# Patient Record
Sex: FEMALE | URBAN - METROPOLITAN AREA
[De-identification: names, ages, dates, MRNs, and addresses within clinical notes are randomized per-mention and may not be internally consistent; named-entity substitution may affect disease eponyms.]

---

## 2022-10-05 ENCOUNTER — APPOINTMENT (OUTPATIENT)
Dept: URBAN - METROPOLITAN AREA SURGERY 18 | Age: 77
Setting detail: DERMATOLOGY
End: 2022-10-10

## 2022-10-05 DIAGNOSIS — D18.0 HEMANGIOMA: ICD-10-CM

## 2022-10-05 DIAGNOSIS — L57.0 ACTINIC KERATOSIS: ICD-10-CM

## 2022-10-05 DIAGNOSIS — Z85.828 PERSONAL HISTORY OF OTHER MALIGNANT NEOPLASM OF SKIN: ICD-10-CM

## 2022-10-05 DIAGNOSIS — D485 NEOPLASM OF UNCERTAIN BEHAVIOR OF SKIN: ICD-10-CM

## 2022-10-05 DIAGNOSIS — L82.1 OTHER SEBORRHEIC KERATOSIS: ICD-10-CM

## 2022-10-05 DIAGNOSIS — Z85.820 PERSONAL HISTORY OF MALIGNANT MELANOMA OF SKIN: ICD-10-CM

## 2022-10-05 DIAGNOSIS — Z71.89 OTHER SPECIFIED COUNSELING: ICD-10-CM

## 2022-10-05 DIAGNOSIS — L81.4 OTHER MELANIN HYPERPIGMENTATION: ICD-10-CM

## 2022-10-05 DIAGNOSIS — D22 MELANOCYTIC NEVI: ICD-10-CM

## 2022-10-05 PROBLEM — D18.01 HEMANGIOMA OF SKIN AND SUBCUTANEOUS TISSUE: Status: ACTIVE | Noted: 2022-10-05

## 2022-10-05 PROBLEM — D22.5 MELANOCYTIC NEVI OF TRUNK: Status: ACTIVE | Noted: 2022-10-05

## 2022-10-05 PROBLEM — D22.62 MELANOCYTIC NEVI OF LEFT UPPER LIMB, INCLUDING SHOULDER: Status: ACTIVE | Noted: 2022-10-05

## 2022-10-05 PROBLEM — D22.61 MELANOCYTIC NEVI OF RIGHT UPPER LIMB, INCLUDING SHOULDER: Status: ACTIVE | Noted: 2022-10-05

## 2022-10-05 PROBLEM — D48.5 NEOPLASM OF UNCERTAIN BEHAVIOR OF SKIN: Status: ACTIVE | Noted: 2022-10-05

## 2022-10-05 PROBLEM — D22.72 MELANOCYTIC NEVI OF LEFT LOWER LIMB, INCLUDING HIP: Status: ACTIVE | Noted: 2022-10-05

## 2022-10-05 PROBLEM — D22.71 MELANOCYTIC NEVI OF RIGHT LOWER LIMB, INCLUDING HIP: Status: ACTIVE | Noted: 2022-10-05

## 2022-10-05 PROCEDURE — 17003 DESTRUCT PREMALG LES 2-14: CPT

## 2022-10-05 PROCEDURE — OTHER COUNSELING: OTHER

## 2022-10-05 PROCEDURE — OTHER BIOPSY BY SHAVE METHOD: OTHER

## 2022-10-05 PROCEDURE — OTHER DIAGNOSIS COMMENT: OTHER

## 2022-10-05 PROCEDURE — 99204 OFFICE O/P NEW MOD 45 MIN: CPT | Mod: 25

## 2022-10-05 PROCEDURE — 17000 DESTRUCT PREMALG LESION: CPT | Mod: 59

## 2022-10-05 PROCEDURE — OTHER MEDICATION COUNSELING: OTHER

## 2022-10-05 PROCEDURE — 11103 TANGNTL BX SKIN EA SEP/ADDL: CPT

## 2022-10-05 PROCEDURE — OTHER TREATMENT REGIMEN: OTHER

## 2022-10-05 PROCEDURE — OTHER PRESCRIPTION: OTHER

## 2022-10-05 PROCEDURE — OTHER LIQUID NITROGEN: OTHER

## 2022-10-05 PROCEDURE — 11102 TANGNTL BX SKIN SINGLE LES: CPT

## 2022-10-05 RX ORDER — FLUOROURACIL 2 G/40G
CREAM TOPICAL BID
Qty: 40 | Refills: 0 | Status: ERX | COMMUNITY
Start: 2022-10-05

## 2022-10-05 ASSESSMENT — LOCATION DETAILED DESCRIPTION DERM
LOCATION DETAILED: RIGHT MID-UPPER BACK
LOCATION DETAILED: LEFT ANTERIOR DISTAL THIGH
LOCATION DETAILED: RIGHT ANTERIOR PROXIMAL THIGH
LOCATION DETAILED: RIGHT INFERIOR MEDIAL UPPER BACK
LOCATION DETAILED: RIGHT MEDIAL FOREHEAD
LOCATION DETAILED: LEFT DISTAL PRETIBIAL REGION
LOCATION DETAILED: RIGHT PROXIMAL RADIAL DORSAL FOREARM
LOCATION DETAILED: RIGHT DISTAL PRETIBIAL REGION
LOCATION DETAILED: MID TRAPEZIAL NECK
LOCATION DETAILED: RIGHT PROXIMAL DORSAL FOREARM
LOCATION DETAILED: LEFT ANTERIOR SHOULDER
LOCATION DETAILED: LEFT SUPERIOR CRUS OF ANTIHELIX
LOCATION DETAILED: LEFT LATERAL TEMPLE
LOCATION DETAILED: STERNAL NOTCH
LOCATION DETAILED: RIGHT ANTERIOR DISTAL THIGH
LOCATION DETAILED: LEFT ANTERIOR PROXIMAL UPPER ARM
LOCATION DETAILED: PERIUMBILICAL SKIN
LOCATION DETAILED: LEFT SUPERIOR MEDIAL UPPER BACK
LOCATION DETAILED: RIGHT ANTERIOR PROXIMAL UPPER ARM
LOCATION DETAILED: RIGHT MEDIAL SUPERIOR CHEST
LOCATION DETAILED: MIDDLE STERNUM
LOCATION DETAILED: SUBXIPHOID
LOCATION DETAILED: RIGHT LATERAL TEMPLE
LOCATION DETAILED: EPIGASTRIC SKIN
LOCATION DETAILED: LEFT ANTERIOR PROXIMAL THIGH
LOCATION DETAILED: SUPERIOR THORACIC SPINE
LOCATION DETAILED: RIGHT DISTAL DORSAL FOREARM
LOCATION DETAILED: RIGHT SUPERIOR UPPER BACK
LOCATION DETAILED: RIGHT ANTERIOR SHOULDER
LOCATION DETAILED: LEFT INFERIOR MEDIAL MALAR CHEEK
LOCATION DETAILED: RIGHT INFERIOR LATERAL FOREHEAD
LOCATION DETAILED: LEFT INFERIOR FOREHEAD

## 2022-10-05 ASSESSMENT — LOCATION SIMPLE DESCRIPTION DERM
LOCATION SIMPLE: RIGHT FOREARM
LOCATION SIMPLE: RIGHT PRETIBIAL REGION
LOCATION SIMPLE: RIGHT UPPER ARM
LOCATION SIMPLE: LEFT TEMPLE
LOCATION SIMPLE: ABDOMEN
LOCATION SIMPLE: RIGHT FOREHEAD
LOCATION SIMPLE: CHEST
LOCATION SIMPLE: LEFT EAR
LOCATION SIMPLE: RIGHT TEMPLE
LOCATION SIMPLE: RIGHT THIGH
LOCATION SIMPLE: LEFT FOREHEAD
LOCATION SIMPLE: LEFT UPPER BACK
LOCATION SIMPLE: RIGHT SHOULDER
LOCATION SIMPLE: LEFT SHOULDER
LOCATION SIMPLE: LEFT CHEEK
LOCATION SIMPLE: LEFT PRETIBIAL REGION
LOCATION SIMPLE: RIGHT UPPER BACK
LOCATION SIMPLE: LEFT UPPER ARM
LOCATION SIMPLE: LEFT THIGH
LOCATION SIMPLE: UPPER BACK
LOCATION SIMPLE: TRAPEZIAL NECK

## 2022-10-05 ASSESSMENT — LOCATION ZONE DERM
LOCATION ZONE: NECK
LOCATION ZONE: EAR
LOCATION ZONE: LEG
LOCATION ZONE: TRUNK
LOCATION ZONE: FACE
LOCATION ZONE: ARM

## 2022-10-05 NOTE — PROCEDURE: MEDICATION COUNSELING
Xelebonyz Pregnancy And Lactation Text: This medication is Pregnancy Category D and is not considered safe during pregnancy.  The risk during breast feeding is also uncertain.

## 2022-10-05 NOTE — PROCEDURE: DIAGNOSIS COMMENT
Render Risk Assessment In Note?: no
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  Too many to biopsy today.  Risks/benefits of biopsy reviewed.  All potential adverse outcomes associated with declining a biopsy reviewed thus close f/u is important.  She verbalized understanding.
Detail Level: Simple

## 2022-10-18 ENCOUNTER — APPOINTMENT (OUTPATIENT)
Dept: URBAN - METROPOLITAN AREA SURGERY 18 | Age: 77
Setting detail: DERMATOLOGY
End: 2022-10-23

## 2022-10-18 DIAGNOSIS — L82.0 INFLAMED SEBORRHEIC KERATOSIS: ICD-10-CM

## 2022-10-18 DIAGNOSIS — D485 NEOPLASM OF UNCERTAIN BEHAVIOR OF SKIN: ICD-10-CM

## 2022-10-18 DIAGNOSIS — L57.0 ACTINIC KERATOSIS: ICD-10-CM

## 2022-10-18 DIAGNOSIS — Z71.89 OTHER SPECIFIED COUNSELING: ICD-10-CM

## 2022-10-18 DIAGNOSIS — L24.4 IRRITANT CONTACT DERMATITIS DUE TO DRUGS IN CONTACT WITH SKIN: ICD-10-CM

## 2022-10-18 PROBLEM — D48.5 NEOPLASM OF UNCERTAIN BEHAVIOR OF SKIN: Status: ACTIVE | Noted: 2022-10-18

## 2022-10-18 PROCEDURE — 17000 DESTRUCT PREMALG LESION: CPT | Mod: 59

## 2022-10-18 PROCEDURE — OTHER BIOPSY BY SHAVE METHOD: OTHER

## 2022-10-18 PROCEDURE — OTHER COUNSELING: OTHER

## 2022-10-18 PROCEDURE — OTHER LIQUID NITROGEN: OTHER

## 2022-10-18 PROCEDURE — 99214 OFFICE O/P EST MOD 30 MIN: CPT | Mod: 25

## 2022-10-18 PROCEDURE — 11102 TANGNTL BX SKIN SINGLE LES: CPT | Mod: 59

## 2022-10-18 PROCEDURE — OTHER MEDICATION COUNSELING: OTHER

## 2022-10-18 PROCEDURE — OTHER DIAGNOSIS COMMENT: OTHER

## 2022-10-18 PROCEDURE — 11103 TANGNTL BX SKIN EA SEP/ADDL: CPT | Mod: 59

## 2022-10-18 PROCEDURE — 17110 DESTRUCT B9 LESION 1-14: CPT

## 2022-10-18 PROCEDURE — OTHER TREATMENT REGIMEN: OTHER

## 2022-10-18 ASSESSMENT — LOCATION DETAILED DESCRIPTION DERM
LOCATION DETAILED: RIGHT FOREHEAD
LOCATION DETAILED: RIGHT LATERAL TRAPEZIAL NECK
LOCATION DETAILED: LEFT DISTAL DORSAL FOREARM
LOCATION DETAILED: LEFT INFERIOR CENTRAL MALAR CHEEK
LOCATION DETAILED: PHILTRUM
LOCATION DETAILED: RIGHT LATERAL EYEBROW
LOCATION DETAILED: LEFT SUPERIOR UPPER BACK
LOCATION DETAILED: RIGHT CHIN
LOCATION DETAILED: RIGHT INFERIOR CENTRAL MALAR CHEEK
LOCATION DETAILED: RIGHT SUPERIOR UPPER BACK
LOCATION DETAILED: NASAL DORSUM
LOCATION DETAILED: LEFT CENTRAL LATERAL NECK

## 2022-10-18 ASSESSMENT — LOCATION ZONE DERM
LOCATION ZONE: LIP
LOCATION ZONE: NOSE
LOCATION ZONE: NECK
LOCATION ZONE: ARM
LOCATION ZONE: FACE
LOCATION ZONE: TRUNK

## 2022-10-18 ASSESSMENT — LOCATION SIMPLE DESCRIPTION DERM
LOCATION SIMPLE: POSTERIOR NECK
LOCATION SIMPLE: RIGHT UPPER BACK
LOCATION SIMPLE: RIGHT EYEBROW
LOCATION SIMPLE: RIGHT CHEEK
LOCATION SIMPLE: LEFT CHEEK
LOCATION SIMPLE: UPPER LIP
LOCATION SIMPLE: LEFT FOREARM
LOCATION SIMPLE: CHIN
LOCATION SIMPLE: RIGHT FOREHEAD
LOCATION SIMPLE: NECK
LOCATION SIMPLE: LEFT UPPER BACK
LOCATION SIMPLE: NOSE

## 2022-10-18 NOTE — PROCEDURE: MEDICATION COUNSELING
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 Patient states she has been taking one Norco with one Extra Strength Tylenol every 5 hours for pain and has just 2 left.  She has an appointment with Moshe on Wednesday and is nervous about not having enough to get by.  She notes she is having a moderate amount of pain at the 5 hour roxanne and feels she still needs the Norco.  Patient is taking lovenox for a family history of blood clots, and for this reason states she was told she cannot take ibuprofen to manage her pain.       Tazorac Counseling:  Patient advised that medication is irritating and drying.  Patient may need to apply sparingly and wash off after an hour before eventually leaving it on overnight.  The patient verbalized understanding of the proper use and possible adverse effects of tazorac.  All of the patient's questions and concerns were addressed.

## 2022-10-18 NOTE — PROCEDURE: DIAGNOSIS COMMENT
Detail Level: Simple
Comment: Several lesions suspicious for NMSCs, too many to biopsy today.  F/u in 3 weeks for biopsy. She is aware that she may have life threatening skin cancers in place thus close f/u is important.  She verbalized understanding.
Render Risk Assessment In Note?: no

## 2022-10-18 NOTE — PROCEDURE: BIOPSY BY SHAVE METHOD
Triangulation (Location Of Lesion In Relation To Distance From Anatomical Landmarks): B is lateral and inferior to A

## 2022-10-18 NOTE — PROCEDURE: TREATMENT REGIMEN
Detail Level: Zone
Continue Regimen: Efudex BID to the AA to the nose, cheeks and upper lip x 1 more week
Continue Regimen: Efudex BID to the AA on the face x 1 more week.

## 2022-10-18 NOTE — HPI: SKIN LESION (ACTINIC KERATOSES)
How Severe Are They?: moderate
Is This A New Presentation, Or A Follow-Up?: Follow Up Actinic Keratoses
Additional History: Patient use the Efudex cream b.i.d. to the infected areas on the face which were the cheeks,  upper lip and chin x 2 weeks. Patient last use of the cream was this morning.

## 2022-10-25 ENCOUNTER — APPOINTMENT (OUTPATIENT)
Dept: URBAN - METROPOLITAN AREA SURGERY 18 | Age: 77
Setting detail: DERMATOLOGY
End: 2022-10-26

## 2022-10-25 VITALS — SYSTOLIC BLOOD PRESSURE: 103 MMHG | HEART RATE: 75 BPM | DIASTOLIC BLOOD PRESSURE: 64 MMHG

## 2022-10-25 PROBLEM — C44.529 SQUAMOUS CELL CARCINOMA OF SKIN OF OTHER PART OF TRUNK: Status: ACTIVE | Noted: 2022-10-25

## 2022-10-25 PROCEDURE — OTHER COUNSELING: OTHER

## 2022-10-25 PROCEDURE — 12032 INTMD RPR S/A/T/EXT 2.6-7.5: CPT | Mod: 79

## 2022-10-25 PROCEDURE — OTHER MIPS QUALITY: OTHER

## 2022-10-25 PROCEDURE — OTHER EXCISION: OTHER

## 2022-10-25 PROCEDURE — 11603 EXC TR-EXT MAL+MARG 2.1-3 CM: CPT | Mod: 79

## 2022-10-25 NOTE — PROCEDURE: EXCISION
Spoke with patient and patient states she has a full prescription of the metronidazole so she will just take this and if not better she will make an appointment. Patient advised not to drink alcohol while taking it. Patient states no further questions. Keystone Flap Text: The defect edges were debeveled with a #15 scalpel blade.  Given the location of the defect, shape of the defect a keystone flap was deemed most appropriate.  Using a sterile surgical marker, an appropriate keystone flap was drawn incorporating the defect, outlining the appropriate donor tissue and placing the expected incisions within the relaxed skin tension lines where possible. The area thus outlined was incised deep to adipose tissue with a #15 scalpel blade.  The skin margins were undermined to an appropriate distance in all directions around the primary defect and laterally outward around the flap utilizing iris scissors.

## 2022-11-03 ENCOUNTER — APPOINTMENT (OUTPATIENT)
Dept: URBAN - METROPOLITAN AREA SURGERY 18 | Age: 77
Setting detail: DERMATOLOGY
End: 2022-11-03

## 2022-11-03 DIAGNOSIS — D485 NEOPLASM OF UNCERTAIN BEHAVIOR OF SKIN: ICD-10-CM

## 2022-11-03 DIAGNOSIS — L57.0 ACTINIC KERATOSIS: ICD-10-CM

## 2022-11-03 DIAGNOSIS — Z85.828 PERSONAL HISTORY OF OTHER MALIGNANT NEOPLASM OF SKIN: ICD-10-CM

## 2022-11-03 DIAGNOSIS — Z71.89 OTHER SPECIFIED COUNSELING: ICD-10-CM

## 2022-11-03 DIAGNOSIS — L24.4 IRRITANT CONTACT DERMATITIS DUE TO DRUGS IN CONTACT WITH SKIN: ICD-10-CM

## 2022-11-03 PROBLEM — D48.5 NEOPLASM OF UNCERTAIN BEHAVIOR OF SKIN: Status: ACTIVE | Noted: 2022-11-03

## 2022-11-03 PROCEDURE — OTHER DIAGNOSIS COMMENT: OTHER

## 2022-11-03 PROCEDURE — 11102 TANGNTL BX SKIN SINGLE LES: CPT | Mod: 79

## 2022-11-03 PROCEDURE — 17000 DESTRUCT PREMALG LESION: CPT | Mod: 79,59

## 2022-11-03 PROCEDURE — OTHER BIOPSY BY SHAVE METHOD: OTHER

## 2022-11-03 PROCEDURE — 99214 OFFICE O/P EST MOD 30 MIN: CPT | Mod: 24,25

## 2022-11-03 PROCEDURE — OTHER MEDICATION COUNSELING: OTHER

## 2022-11-03 PROCEDURE — 17003 DESTRUCT PREMALG LES 2-14: CPT | Mod: 79

## 2022-11-03 PROCEDURE — OTHER PRESCRIPTION: OTHER

## 2022-11-03 PROCEDURE — 11103 TANGNTL BX SKIN EA SEP/ADDL: CPT | Mod: 79

## 2022-11-03 PROCEDURE — OTHER COUNSELING: OTHER

## 2022-11-03 PROCEDURE — OTHER LIQUID NITROGEN: OTHER

## 2022-11-03 RX ORDER — FLUOROURACIL 2 G/40G
CREAM TOPICAL BID
Qty: 40 | Refills: 1 | Status: ERX

## 2022-11-03 ASSESSMENT — LOCATION DETAILED DESCRIPTION DERM
LOCATION DETAILED: LEFT CHIN
LOCATION DETAILED: RIGHT PROXIMAL DORSAL FOREARM
LOCATION DETAILED: LEFT SUPERIOR PARIETAL SCALP
LOCATION DETAILED: LEFT RADIAL DORSAL HAND
LOCATION DETAILED: LEFT INFERIOR MEDIAL FOREHEAD
LOCATION DETAILED: LEFT SUPERIOR MEDIAL UPPER BACK
LOCATION DETAILED: LEFT MEDIAL FRONTAL SCALP
LOCATION DETAILED: LEFT PROXIMAL DORSAL FOREARM
LOCATION DETAILED: RIGHT SUPERIOR PARIETAL SCALP
LOCATION DETAILED: RIGHT LATERAL KNEE
LOCATION DETAILED: RIGHT INFERIOR CENTRAL MALAR CHEEK
LOCATION DETAILED: RIGHT LATERAL DISTAL PRETIBIAL REGION
LOCATION DETAILED: RIGHT RADIAL DORSAL HAND
LOCATION DETAILED: RIGHT ULNAR DORSAL HAND
LOCATION DETAILED: NASAL DORSUM
LOCATION DETAILED: LEFT PROXIMAL DORSAL MIDDLE FINGER
LOCATION DETAILED: RIGHT MEDIAL FRONTAL SCALP
LOCATION DETAILED: RIGHT PROXIMAL DORSAL MIDDLE FINGER
LOCATION DETAILED: LEFT SUPERIOR CENTRAL BUCCAL CHEEK

## 2022-11-03 ASSESSMENT — LOCATION SIMPLE DESCRIPTION DERM
LOCATION SIMPLE: LEFT FOREARM
LOCATION SIMPLE: RIGHT FOREARM
LOCATION SIMPLE: CHIN
LOCATION SIMPLE: RIGHT PRETIBIAL REGION
LOCATION SIMPLE: LEFT UPPER BACK
LOCATION SIMPLE: RIGHT MIDDLE FINGER
LOCATION SIMPLE: RIGHT SCALP
LOCATION SIMPLE: LEFT CHEEK
LOCATION SIMPLE: RIGHT HAND
LOCATION SIMPLE: LEFT HAND
LOCATION SIMPLE: NOSE
LOCATION SIMPLE: LEFT MIDDLE FINGER
LOCATION SIMPLE: RIGHT KNEE
LOCATION SIMPLE: RIGHT CHEEK
LOCATION SIMPLE: LEFT SCALP
LOCATION SIMPLE: SCALP
LOCATION SIMPLE: LEFT FOREHEAD

## 2022-11-03 ASSESSMENT — LOCATION ZONE DERM
LOCATION ZONE: FINGER
LOCATION ZONE: TRUNK
LOCATION ZONE: FACE
LOCATION ZONE: HAND
LOCATION ZONE: ARM
LOCATION ZONE: LEG
LOCATION ZONE: NOSE
LOCATION ZONE: SCALP

## 2022-11-03 NOTE — HPI: SKIN LESION (ACTINIC KERATOSES)
How Severe Are They?: moderate
Is This A New Presentation, Or A Follow-Up?: Follow Up Actinic Keratoses
Additional History: Patient states last use of efudex was approximately one week ago.

## 2022-11-07 ENCOUNTER — APPOINTMENT (OUTPATIENT)
Dept: URBAN - METROPOLITAN AREA SURGERY 18 | Age: 77
Setting detail: DERMATOLOGY
End: 2022-11-08

## 2022-11-07 VITALS — HEART RATE: 81 BPM | SYSTOLIC BLOOD PRESSURE: 111 MMHG | DIASTOLIC BLOOD PRESSURE: 75 MMHG

## 2022-11-07 PROBLEM — D04.39 CARCINOMA IN SITU OF SKIN OF OTHER PARTS OF FACE: Status: ACTIVE | Noted: 2022-11-07

## 2022-11-07 PROCEDURE — OTHER MIPS QUALITY: OTHER

## 2022-11-07 PROCEDURE — 17312 MOHS ADDL STAGE: CPT | Mod: 79

## 2022-11-07 PROCEDURE — OTHER MOHS SURGERY: OTHER

## 2022-11-07 PROCEDURE — OTHER COUNSELING: OTHER

## 2022-11-07 PROCEDURE — 17311 MOHS 1 STAGE H/N/HF/G: CPT | Mod: 79

## 2022-11-07 PROCEDURE — 14041 TIS TRNFR F/C/C/M/N/A/G/H/F: CPT | Mod: 79

## 2022-11-21 ENCOUNTER — APPOINTMENT (OUTPATIENT)
Dept: URBAN - METROPOLITAN AREA SURGERY 18 | Age: 77
Setting detail: DERMATOLOGY
End: 2022-11-22

## 2022-11-21 VITALS — HEART RATE: 80 BPM | SYSTOLIC BLOOD PRESSURE: 113 MMHG | DIASTOLIC BLOOD PRESSURE: 64 MMHG

## 2022-11-21 DIAGNOSIS — Z48.817 ENCOUNTER FOR SURGICAL AFTERCARE FOLLOWING SURGERY ON THE SKIN AND SUBCUTANEOUS TISSUE: ICD-10-CM

## 2022-11-21 PROBLEM — D04.39 CARCINOMA IN SITU OF SKIN OF OTHER PARTS OF FACE: Status: ACTIVE | Noted: 2022-11-21

## 2022-11-21 PROCEDURE — 17312 MOHS ADDL STAGE: CPT | Mod: 79

## 2022-11-21 PROCEDURE — OTHER POST-OP WOUND CHECK: OTHER

## 2022-11-21 PROCEDURE — 17311 MOHS 1 STAGE H/N/HF/G: CPT | Mod: 79

## 2022-11-21 PROCEDURE — 99024 POSTOP FOLLOW-UP VISIT: CPT

## 2022-11-21 PROCEDURE — OTHER COUNSELING: OTHER

## 2022-11-21 PROCEDURE — OTHER MIPS QUALITY: OTHER

## 2022-11-21 PROCEDURE — OTHER MOHS SURGERY: OTHER

## 2022-11-21 PROCEDURE — 14041 TIS TRNFR F/C/C/M/N/A/G/H/F: CPT | Mod: 79

## 2022-11-21 ASSESSMENT — LOCATION ZONE DERM: LOCATION ZONE: FACE

## 2022-11-21 ASSESSMENT — LOCATION SIMPLE DESCRIPTION DERM: LOCATION SIMPLE: RIGHT TEMPLE

## 2022-11-21 ASSESSMENT — LOCATION DETAILED DESCRIPTION DERM: LOCATION DETAILED: RIGHT LATERAL TEMPLE

## 2022-11-21 NOTE — PROCEDURE: MOHS SURGERY
Initial Size Of Lesion: 2.8 Anesthesia Volume In Cc: 0.5 Add 52 Modifier (Optional): no Render Post-Care Instructions In Note?: yes Detail Level: Detailed Treatment Number (Will Not Render If 0): 0 Post-Care Instructions: I reviewed with the patient in detail post-care instructions. Patient is to wear sunprotection, and avoid picking at any of the treated lesions. Pt may apply Vaseline to crusted or scabbing areas. Medical Necessity Clause: This procedure was medically necessary because the lesions that were treated were: Medical Necessity Information: It is in your best interest to select a reason for this procedure from the list below. All of these items fulfill various CMS LCD requirements except the new and changing color options. Consent: The patient's consent was obtained including but not limited to risks of crusting, scabbing, blistering, scarring, darker or lighter pigmentary change, recurrence, incomplete removal and infection.

## 2022-11-21 NOTE — PROCEDURE: POST-OP WOUND CHECK
Detail Level: Detailed
Add 94001 Cpt? (Important Note: In 2017 The Use Of 16318 Is Being Tracked By Cms To Determine Future Global Period Reimbursement For Global Periods): yes
Wound Evaluated By: Dr. Edmar Mora
Additional Comments: Patient presents for a 2 week post Mohs wound check and staple removal. The surgical site is healing appropriately and is well contoured. Wound was cleaned with hibiclens and staples were removed. Advised patient to continue applying Vaseline until all crusting resolves. Patient to follow-up as needed.

## 2022-11-30 ENCOUNTER — APPOINTMENT (OUTPATIENT)
Dept: URBAN - METROPOLITAN AREA SURGERY 18 | Age: 77
Setting detail: DERMATOLOGY
End: 2022-12-05

## 2022-11-30 DIAGNOSIS — Z71.89 OTHER SPECIFIED COUNSELING: ICD-10-CM

## 2022-11-30 DIAGNOSIS — L24.4 IRRITANT CONTACT DERMATITIS DUE TO DRUGS IN CONTACT WITH SKIN: ICD-10-CM

## 2022-11-30 PROCEDURE — OTHER COUNSELING: OTHER

## 2022-11-30 PROCEDURE — OTHER MEDICATION COUNSELING: OTHER

## 2022-11-30 PROCEDURE — 99214 OFFICE O/P EST MOD 30 MIN: CPT | Mod: 24

## 2022-11-30 PROCEDURE — OTHER PRESCRIPTION: OTHER

## 2022-11-30 RX ORDER — HYDROCORTISONE 25 MG/G
OINTMENT TOPICAL
Qty: 28.35 | Refills: 1 | Status: ERX | COMMUNITY
Start: 2022-11-30

## 2022-11-30 ASSESSMENT — LOCATION DETAILED DESCRIPTION DERM
LOCATION DETAILED: RIGHT PROXIMAL DORSAL FOREARM
LOCATION DETAILED: LEFT PROXIMAL DORSAL FOREARM

## 2022-11-30 ASSESSMENT — LOCATION SIMPLE DESCRIPTION DERM
LOCATION SIMPLE: LEFT FOREARM
LOCATION SIMPLE: RIGHT FOREARM

## 2022-11-30 ASSESSMENT — LOCATION ZONE DERM: LOCATION ZONE: ARM

## 2022-11-30 NOTE — HPI: SKIN LESION (ACTINIC KERATOSES)
How Severe Are They?: moderate
Is This A New Presentation, Or A Follow-Up?: Follow Up Actinic Keratoses
Additional History: Patient states she used efudex BID to AA on both forearms x 3 weeks. Patient states her last use of the medication was 5 days ago.

## 2022-11-30 NOTE — PROCEDURE: MEDICATION COUNSELING
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 Patient reported having migraines, N/T bilateral hands. Headaches had a visual prodrome. Hx of migraines since a teenager. Patient consented to above procedure.    Hospital Course:  Patient admitted for above procedure. The procedure itself was without complication. Post-operative transferred to regular floor on POD#2.  She had headache first three days, keeping room dark, use of an ice pack and not moving. She was not utilization oral pain medication to help with pain. Encouraged to take mediation, get up for meals and ambulate.  Today, patient and nursing indicates that she is now ambulating, voiding without difficulty. Tolerating oral intake. Patient denies headache, emesis, vision changes, numbness or tingling. Patient requesting to be discharge home.      Discharge Physical Exam:    Temp:  [98 °F (36.7 °C)-98.9 °F (37.2 °C)] 98 °F (36.7 °C)  Heart Rate:  [] 82  Resp:  [15-18] 18  BP: (117-139)/(79-95) 117/79    Current labs:  Lab Results (last 24 hours)     Procedure Component Value Units Date/Time    CBC & Differential [[id number]]  (Abnormal) Collected: 03/02/21 0729    Specimen: Blood Updated: 03/02/21 0814    Narrative:      The following orders were created for panel order CBC & Differential.  Procedure                                Abnormality         Status                     ---------                               -----------         ------                     CBC Auto Differential[[id number]]        Abnormal            Final result                 Please view results for these tests on the individual orders.    Basic Metabolic Panel [[id number]]  (Abnormal) Collected: 03/02/21 0729    Specimen: Blood Updated: 03/02/21 0841     Glucose 87 mg/dL      BUN 12 mg/dL      Creatinine 0.52 mg/dL      Sodium 134 mmol/L      Potassium 3.2 mmol/L      Chloride 97 mmol/L      CO2 22.0 mmol/L      Calcium 9.2 mg/dL      eGFR Non African Amer 131 mL/min/1.73      BUN/Creatinine Ratio 23.1     Anion Gap 15.0 mmol/L     Narrative:      GFR Normal >60  Chronic Kidney Disease <60  Kidney Failure <15      CBC Auto Differential [[id number]]  (Abnormal) Collected: 03/02/21 0729    Specimen: Blood Updated: 03/02/21 0814     WBC 8.24 10*3/mm3      RBC 4.29 10*6/mm3      Hemoglobin 13.1 g/dL      Hematocrit 38.3 %      MCV 89.3 fL      MCH 30.5 pg      MCHC 34.2 g/dL      RDW 14.2 %      RDW-SD 46.3 fl      MPV 10.5 fL      Platelets 357 10*3/mm3      Neutrophil % 60.3 %      Lymphocyte % 23.7 %      Monocyte % 13.2 %      Eosinophil % 1.1 %      Basophil % 0.4 %      Immature Grans % 1.3 %      Neutrophils, Absolute 4.97 10*3/mm3      Lymphocytes, Absolute 1.95 10*3/mm3      Monocytes, Absolute 1.09 10*3/mm3      Eosinophils, Absolute 0.09 10*3/mm3      Basophils, Absolute 0.03 10*3/mm3      Immature Grans, Absolute 0.11 10*3/mm3      nRBC 0.1 /100 WBC             General Appearance No acute distress   HEENT Bifrontal incision well approximated no redness or drainage.    Neurological Awake, Alert, and oriented x 3.   Motor Strength 5/5, no pronator drift   Sensory Intact to light touch face, upper and lower extremities.    Reflexes Bilateral plantar flexor and neg arm drift.   Cranial Nerves  PERRL 4 mm, EOMI bilaterally, No nystagmus   Cerebellar No finger-to-nose  dysmetria   Neck Supple, trachea midline   Extremities Moves all extremities well, no edema, no tenderness, no redness. No SCDs.   Abd Soft, +BS x 4 quads LLQ incision edges well approximated no redness or drainage.      Discharge Medications  Arthur has been reviewed and narcotic consent is on file in the patient's chart.     Your medication list      START taking these medications      Instructions Last Dose Given Next Dose Due   cyclobenzaprine 10 MG tablet  Commonly known as: FLEXERIL      Take 1 tablet by mouth 3 (Three) Times a Day As Needed for Muscle Spasms.       docusate sodium 100 MG capsule  Start taking on: March 3, 2021      Take 1 capsule by mouth Daily.       HYDROcodone-acetaminophen 5-325 MG per tablet  Commonly known as: NORCO      Take 1 tablet by mouth Every 6 (Six) Hours As Needed for Moderate Pain  or Severe Pain .          CHANGE how you take these medications      Instructions Last Dose Given Next Dose Due   topiramate 25 MG tablet  Commonly known as: Topamax  What changed:   · how much to take  · how to take this  · when to take this      Take one tablet by mouth nightly for 1 week, and then increase to 2 tablets by mouth nightly.          CONTINUE taking these medications      Instructions Last Dose Given Next Dose Due   acetaminophen 325 MG tablet  Commonly known as: TYLENOL      Take 650 mg by mouth Every 6 (Six) Hours As Needed for Mild Pain .       amitriptyline 25 MG tablet  Commonly known as: ELAVIL      Take 50 mg by mouth Every Night.       atorvastatin 10 MG tablet  Commonly known as: LIPITOR      Take 10 mg by mouth Every Night.       fexofenadine 180 MG tablet  Commonly known as: ALLEGRA      Take 180 mg by mouth Daily.       hydroCHLOROthiazide 25 MG tablet  Commonly known as: HYDRODIURIL      Take 25 mg by mouth Daily.       levothyroxine 125 MCG tablet  Commonly known as: SYNTHROID, LEVOTHROID      Take 125 mcg by mouth Daily.       ondansetron 4 MG tablet  Commonly known as:  ZOFRAN      Take 4 mg by mouth Every 8 (Eight) Hours As Needed for Nausea or Vomiting.       SUMAtriptan 50 MG tablet  Commonly known as: IMITREX      Take 50 mg by mouth Every 2 (Two) Hours As Needed for Migraine. Take one tablet at onset of headache. May repeat dose one time in 2 hours if headache not relieved.          STOP taking these medications    cetirizine 10 MG tablet  Commonly known as: zyrTEC        loratadine 10 MG tablet  Commonly known as: CLARITIN              Where to Get Your Medications      These medications were sent to [address] - [address] [place] [address] AT [facility] [phone]  - [phone] [place] [id number]    Phone: [phone]   · cyclobenzaprine 10 MG tablet  · HYDROcodone-acetaminophen 5-325 MG per tablet     You can get these medications from any pharmacy    You don't need a prescription for these medications  · docusate sodium 100 MG capsule         Discharge Diet:   Diet Instructions     Advance Diet as Tolerated      Diet:      Diet Texture / Consistency: Regular    Fluid Consistency: Thin    Common Modifiers: Vegetarian    Vegetarian Level: Vegan: Absolutely No Animal Products    Advance as tolerated        Diet Order   Procedures   • Diet Regular; Vegetarian; Vegan: Absolutely No Animal Products       Activity at Discharge:   Activity Instructions     Discharge Activity      1) No driving until cleared by us and no longer taking narcotics.   2) Off work/school until cleared by us.  3) May shower 3/3/21 but no submerging wound in tub, pool, etc.  4) Do not lift / push / pull more then 10 lbs.*  5.) No exertional or impact activity.  6.) Keep head elevated 30 degrees for 1 month.  7.) Ice pack 20 mins as needed    Pelvic Rest            Call for: questions or concerns    Follow-up Appointments  Future Appointments   Date Time Provider Department Center   3/18/2021 11:45 AM Gume Carver MD [facility]   3/23/2021  8:00 AM Miguel Dong APRN [facility]     Follow-up Information     Dhara Garcia APRN .    Specialty: Internal Medicine  Contact information:  [address] [place] [address] [phone]                 Additional Instructions for the Follow-ups that You Need to Schedule     Discharge Follow-up with Specialty: Neurosurgery Dr Carver; 2 Weeks   As directed      Specialty: Neurosurgery Dr Carver    Follow Up: 2 Weeks    Follow Up Details: Keep scheduled appointment         Notify Physician or Go To The ED For the Following Conditions   As directed      Including but not limited to fever >100.5, chills, wound concerns (redness, swelling, drainage), new symptoms of numbness, tingling, weakness; speech or vision changes, new or uncontrolled pain despite using prescribed medications    Order Comments: Including but not limited to fever >100.5, chills, wound concerns (redness, swelling, drainage), new symptoms of numbness, tingling, weakness; speech or vision changes, new or uncontrolled pain despite using prescribed medications                Test Results Pending at Discharge  Pending Labs     Order Current Status    Tissue Pathology Exam In process      No new imaging today.  Dr Carver reviewed MRI brain from 3/1/2021.     I discussed the discharge instructions with patient and Dr Carver.     Tatiana Johnson, JULIA  03/02/21  13:34 EST      \"Dictated utilizing Dragon dictation\".    " Adbry Counseling: I discussed with the patient the risks of tralokinumab including but not limited to eye infection and irritation, cold sores, injection site reactions, worsening of asthma, allergic reactions and increased risk of parasitic infection.  Live vaccines should be avoided while taking tralokinumab. The patient understands that monitoring is required and they must alert us or the primary physician if symptoms of infection or other concerning signs are noted.

## 2022-11-30 NOTE — PROCEDURE: MEDICATION COUNSELING
Detail Level: Zone Quality 226: Preventive Care And Screening: Tobacco Use: Screening And Cessation Intervention: Patient screened for tobacco use and is an ex/non-smoker Quality 110: Preventive Care And Screening: Influenza Immunization: Influenza Immunization previously received during influenza season Finasteride Pregnancy And Lactation Text: This medication is absolutely contraindicated during pregnancy. It is unknown if it is excreted in breast milk.

## 2022-12-05 ENCOUNTER — APPOINTMENT (OUTPATIENT)
Dept: URBAN - METROPOLITAN AREA SURGERY 18 | Age: 77
Setting detail: DERMATOLOGY
End: 2022-12-05

## 2022-12-05 VITALS — HEART RATE: 75 BPM | DIASTOLIC BLOOD PRESSURE: 72 MMHG | SYSTOLIC BLOOD PRESSURE: 113 MMHG

## 2022-12-05 DIAGNOSIS — Z48.817 ENCOUNTER FOR SURGICAL AFTERCARE FOLLOWING SURGERY ON THE SKIN AND SUBCUTANEOUS TISSUE: ICD-10-CM

## 2022-12-05 PROBLEM — D04.39 CARCINOMA IN SITU OF SKIN OF OTHER PARTS OF FACE: Status: ACTIVE | Noted: 2022-12-05

## 2022-12-05 PROCEDURE — OTHER MIPS QUALITY: OTHER

## 2022-12-05 PROCEDURE — OTHER MOHS SURGERY: OTHER

## 2022-12-05 PROCEDURE — 99024 POSTOP FOLLOW-UP VISIT: CPT

## 2022-12-05 PROCEDURE — 13132 CMPLX RPR F/C/C/M/N/AX/G/H/F: CPT | Mod: 79

## 2022-12-05 PROCEDURE — OTHER POST-OP WOUND CHECK: OTHER

## 2022-12-05 PROCEDURE — 17311 MOHS 1 STAGE H/N/HF/G: CPT | Mod: 79

## 2022-12-05 PROCEDURE — OTHER COUNSELING: OTHER

## 2022-12-05 ASSESSMENT — LOCATION DETAILED DESCRIPTION DERM: LOCATION DETAILED: LEFT CENTRAL TEMPLE

## 2022-12-05 ASSESSMENT — LOCATION SIMPLE DESCRIPTION DERM: LOCATION SIMPLE: LEFT TEMPLE

## 2022-12-05 ASSESSMENT — LOCATION ZONE DERM: LOCATION ZONE: FACE

## 2022-12-05 NOTE — PROCEDURE: MOHS SURGERY
General (Pediatric) Trilobed Flap Text: Given the location of the defect, inherent tension at the surgical site, and the proximity to free margins a trilobed transposition flap was deemed most appropriate for wound reconstruction. The risks, benefits, and possible outcomes of this procedure were discussed along with the risks, benefits, and possible outcomes of other options for wound repair (including but not limited to: complex closure, other flaps, grafts, and second intention). The patient verbalized understanding and consent to the outlined procedure. The patient verbalized understanding that intraoperative conditions may necessitate a change in the outlined procedure resulting in modification of the original surgical plan. This decision may be made at the discretion of the surgeon, and is due to factors subject to change or that are difficult to predict preoperatively. We also discussed that should the patient heal with a scar they find noticeable/unfavorable additional surgical procedures and/or referral to additional specialists may be required. The patient understands the healing process takes place over many months and that the \"final\" appearance of the scar line often takes a year or longer to achieve.\\nUsing a sterile surgical marker, an appropriate trilobe transposition flap was drawn incorporating the defect and placing the expected incisions within the relaxed skin tension lines where possible. The surgical site and surrounding skin were prepped and draped in sterile fashion.  A single standing cone was removed at the expected pivot point of the transposition flap in the appropriate surgical plane, repositioning as necessary based upon local tension, proximity to free margins/other anatomic structures, and position of the relaxed skin tension lines. The flap (containing three lobes  by 45 degrees, each proportionate in size to the postop defect) was incised and raised in the appropriate surgical plane. The resulting defect was undermined widely and bilaterally in the appropriate surgical plane taking care to preserve local arteries, veins, nerves, and other structures of anatomic importance. This created a flap capable of transposing across the defect to close the wound under minimal tension, without distortion of adjacent free margins. Hemostasis was achieved and then the wound was sutured in layered fashion.

## 2022-12-05 NOTE — PROCEDURE: POST-OP WOUND CHECK
Detail Level: Detailed
Add 35877 Cpt? (Important Note: In 2017 The Use Of 99266 Is Being Tracked By Cms To Determine Future Global Period Reimbursement For Global Periods): yes
Wound Evaluated By: Dr. Edmar Mora
Additional Comments: Patient presents for a 2 week post Mohs flap wound check. Surgical site is healing appropriately and is well contoured. Staples were removed. Advised patient to continue applying Vaseline until crusting resolves. Patient to follow-up as needed.

## 2022-12-05 NOTE — PROCEDURE: MOHS SURGERY
PROCEDURES:  Circumcision using clamp with regional dorsal ring block 2020 18:17:26  Ameena Evans O-Z Plasty Text: The defect edges were debeveled with a #15 scalpel blade.  Given the location of the defect, shape of the defect and the proximity to free margins an O-Z plasty (double transposition flap) was deemed most appropriate.  Using a sterile surgical marker, the appropriate transposition flaps were drawn incorporating the defect and placing the expected incisions within the relaxed skin tension lines where possible.    The area thus outlined was incised deep to adipose tissue with a #15 scalpel blade.  The skin margins were undermined to an appropriate distance in all directions utilizing iris scissors.  Hemostasis was achieved with electrocautery.  The flaps were then transposed into place, one clockwise and the other counterclockwise, and anchored with interrupted buried subcutaneous sutures.

## 2022-12-05 NOTE — PROCEDURE: MOHS SURGERY
125 Abbe Flap (Upper To Lower Lip) Text: The defect of the lower lip was assessed and measured.  Given the location and size of the defect, an Abbe flap was deemed most appropriate.  Using a sterile surgical marker, an appropriate Abbe flap was measured and drawn on the upper lip. Local anesthesia was then infiltrated.  A scalpel was then used to incise the upper lip through and through the skin, vermilion, muscle and mucosa, leaving the flap pedicled on the opposite side.  The flap was then rotated and transferred to the lower lip defect.  The flap was then sutured into place with a three layer technique, closing the orbicularis oris muscle layer with subcutaneous buried sutures, followed by a mucosal layer and an epidermal layer.

## 2022-12-12 ENCOUNTER — APPOINTMENT (OUTPATIENT)
Dept: URBAN - METROPOLITAN AREA SURGERY 18 | Age: 77
Setting detail: DERMATOLOGY
End: 2022-12-13

## 2022-12-12 VITALS — SYSTOLIC BLOOD PRESSURE: 109 MMHG | HEART RATE: 70 BPM | DIASTOLIC BLOOD PRESSURE: 55 MMHG

## 2022-12-12 DIAGNOSIS — Z48.817 ENCOUNTER FOR SURGICAL AFTERCARE FOLLOWING SURGERY ON THE SKIN AND SUBCUTANEOUS TISSUE: ICD-10-CM

## 2022-12-12 PROBLEM — D04.39 CARCINOMA IN SITU OF SKIN OF OTHER PARTS OF FACE: Status: ACTIVE | Noted: 2022-12-12

## 2022-12-12 PROCEDURE — OTHER COUNSELING: OTHER

## 2022-12-12 PROCEDURE — OTHER MIPS QUALITY: OTHER

## 2022-12-12 PROCEDURE — 17312 MOHS ADDL STAGE: CPT | Mod: 79

## 2022-12-12 PROCEDURE — OTHER POST-OP WOUND CHECK: OTHER

## 2022-12-12 PROCEDURE — 14040 TIS TRNFR F/C/C/M/N/A/G/H/F: CPT | Mod: 79

## 2022-12-12 PROCEDURE — OTHER MOHS SURGERY: OTHER

## 2022-12-12 PROCEDURE — 17311 MOHS 1 STAGE H/N/HF/G: CPT | Mod: 79

## 2022-12-12 PROCEDURE — 99024 POSTOP FOLLOW-UP VISIT: CPT

## 2022-12-12 ASSESSMENT — LOCATION SIMPLE DESCRIPTION DERM: LOCATION SIMPLE: RIGHT FOREHEAD

## 2022-12-12 ASSESSMENT — LOCATION ZONE DERM: LOCATION ZONE: FACE

## 2022-12-12 ASSESSMENT — LOCATION DETAILED DESCRIPTION DERM: LOCATION DETAILED: RIGHT MEDIAL FOREHEAD

## 2022-12-12 NOTE — PROCEDURE: POST-OP WOUND CHECK
Detail Level: Detailed
Add 94613 Cpt? (Important Note: In 2017 The Use Of 89423 Is Being Tracked By Cms To Determine Future Global Period Reimbursement For Global Periods): yes
Wound Evaluated By: Dr. Edmar Mora
Additional Comments: Patient presents for a 1 week post Mohs wound check. Surgical site is healing appropriately and is well approximated. Advised patient to continue applying Vaseline until sutures dissolve and she can begin massage of the scar at 6 weeks post op. Patient to follow-up as needed.

## 2022-12-17 NOTE — PROCEDURE: MOHS SURGERY
no Cheiloplasty (Less Than 50%) Text: A decision was made to reconstruct the defect with a  cheiloplasty.  The defect was undermined extensively.  Additional orbicularis oris muscle was excised with a 15 blade scalpel.  The defect was converted into a full thickness wedge, of less than 50% of the vertical height of the lip, to facilite a better cosmetic result.  Small vessels were then tied off with 5-0 monocyrl. The orbicularis oris, superficial fascia, adipose and dermis were then reapproximated.  After the deeper layers were approximated the epidermis was reapproximated with particular care given to realign the vermilion border.

## 2022-12-19 ENCOUNTER — APPOINTMENT (OUTPATIENT)
Dept: URBAN - METROPOLITAN AREA SURGERY 18 | Age: 77
Setting detail: DERMATOLOGY
End: 2022-12-19

## 2022-12-19 VITALS — DIASTOLIC BLOOD PRESSURE: 67 MMHG | HEART RATE: 87 BPM | SYSTOLIC BLOOD PRESSURE: 120 MMHG

## 2022-12-19 DIAGNOSIS — Z48.817 ENCOUNTER FOR SURGICAL AFTERCARE FOLLOWING SURGERY ON THE SKIN AND SUBCUTANEOUS TISSUE: ICD-10-CM

## 2022-12-19 PROBLEM — D04.39 CARCINOMA IN SITU OF SKIN OF OTHER PARTS OF FACE: Status: ACTIVE | Noted: 2022-12-19

## 2022-12-19 PROCEDURE — OTHER POST-OP WOUND CHECK: OTHER

## 2022-12-19 PROCEDURE — 99024 POSTOP FOLLOW-UP VISIT: CPT

## 2022-12-19 PROCEDURE — OTHER MIPS QUALITY: OTHER

## 2022-12-19 PROCEDURE — 17311 MOHS 1 STAGE H/N/HF/G: CPT | Mod: 79

## 2022-12-19 PROCEDURE — OTHER COUNSELING: OTHER

## 2022-12-19 PROCEDURE — 14040 TIS TRNFR F/C/C/M/N/A/G/H/F: CPT | Mod: 79

## 2022-12-19 PROCEDURE — OTHER MOHS SURGERY: OTHER

## 2022-12-19 ASSESSMENT — LOCATION ZONE DERM: LOCATION ZONE: FACE

## 2022-12-19 ASSESSMENT — LOCATION SIMPLE DESCRIPTION DERM: LOCATION SIMPLE: RIGHT FOREHEAD

## 2022-12-19 ASSESSMENT — LOCATION DETAILED DESCRIPTION DERM: LOCATION DETAILED: RIGHT FOREHEAD

## 2022-12-19 NOTE — PROCEDURE: MOHS SURGERY
Helical Rim Text: The closure involved the helical rim. Skin Substitute Paste Text: The defect edges were debeveled with a #15 scalpel blade.  Given the location of the defect, shape of the defect and the proximity to free margins a skin substitute micronized graft was deemed most appropriate.  The entire vial contents were admixed with 0.5ccs of sterile saline, formed into a paste and then evenly spread over the entire wound bed.

## 2022-12-19 NOTE — PROCEDURE: MOHS SURGERY
Unknown Hatchet Flap Text: The defect edges were debeveled with a #15 scalpel blade.  Given the location of the defect, shape of the defect and the proximity to free margins a hatchet flap was deemed most appropriate.  Using a sterile surgical marker, an appropriate hatchet flap was drawn incorporating the defect and placing the expected incisions within the relaxed skin tension lines where possible.    The area thus outlined was incised deep to adipose tissue with a #15 scalpel blade.  The skin margins were undermined to an appropriate distance in all directions utilizing iris scissors.

## 2022-12-19 NOTE — PROCEDURE: POST-OP WOUND CHECK
Detail Level: Detailed
Add 84042 Cpt? (Important Note: In 2017 The Use Of 22591 Is Being Tracked By Cms To Determine Future Global Period Reimbursement For Global Periods): yes
Wound Evaluated By: Dr. Edmar Mora

## 2023-01-03 ENCOUNTER — APPOINTMENT (OUTPATIENT)
Dept: URBAN - METROPOLITAN AREA SURGERY 18 | Age: 78
Setting detail: DERMATOLOGY
End: 2023-01-05

## 2023-01-03 VITALS — HEART RATE: 72 BPM | DIASTOLIC BLOOD PRESSURE: 62 MMHG | SYSTOLIC BLOOD PRESSURE: 115 MMHG

## 2023-01-03 PROBLEM — C44.41 BASAL CELL CARCINOMA OF SKIN OF SCALP AND NECK: Status: ACTIVE | Noted: 2023-01-03

## 2023-01-03 PROCEDURE — OTHER MIPS QUALITY: OTHER

## 2023-01-03 PROCEDURE — OTHER ADDITIONAL NOTES: OTHER

## 2023-01-03 PROCEDURE — 17312 MOHS ADDL STAGE: CPT | Mod: 79

## 2023-01-03 PROCEDURE — 17311 MOHS 1 STAGE H/N/HF/G: CPT | Mod: 79

## 2023-01-03 PROCEDURE — OTHER MOHS SURGERY: OTHER

## 2023-01-03 PROCEDURE — 14301 TIS TRNFR ANY 30.1-60 SQ CM: CPT | Mod: 79

## 2023-01-03 PROCEDURE — OTHER COUNSELING: OTHER

## 2023-01-03 PROCEDURE — 17311 MOHS 1 STAGE H/N/HF/G: CPT | Mod: 79,76

## 2023-01-03 NOTE — PROCEDURE: ADDITIONAL NOTES
Render Risk Assessment In Note?: no
Detail Level: Simple
Additional Notes: * Following the second stage of surgery the two surgical defects intersected to become one larger surgical defect (as opposed to two separate but adjacent defects). So while surgery was continued to clear both tumors, from the standpoint of medical documentation subsequent stages (stages 3-5) were documented under the surgical site above. No tumor remained at either of the two surgical sites following the fifth stage of surgery.

## 2023-01-03 NOTE — HPI: PROCEDURE (MOHS)
Has The Growth Been Previously Biopsied?: has been previously biopsied
Body Location Override (Optional): Right lateral trapezial neck  (“right superior upper back”) B is lateral and inferior to A

## 2023-01-03 NOTE — PROCEDURE: MOHS SURGERY
Body Location Override (Optional - Billing Will Still Be Based On Selected Body Map Location If Applicable): Right lateral trapezial neck  (“right superior upper back”) B is lateral and inferior to A

## 2023-01-09 ENCOUNTER — APPOINTMENT (OUTPATIENT)
Dept: URBAN - METROPOLITAN AREA SURGERY 18 | Age: 78
Setting detail: DERMATOLOGY
End: 2023-01-11

## 2023-01-09 VITALS — SYSTOLIC BLOOD PRESSURE: 101 MMHG | DIASTOLIC BLOOD PRESSURE: 61 MMHG | HEART RATE: 90 BPM

## 2023-01-09 DIAGNOSIS — Z48.817 ENCOUNTER FOR SURGICAL AFTERCARE FOLLOWING SURGERY ON THE SKIN AND SUBCUTANEOUS TISSUE: ICD-10-CM

## 2023-01-09 PROBLEM — D03.71 MELANOMA IN SITU OF RIGHT LOWER LIMB, INCLUDING HIP: Status: ACTIVE | Noted: 2023-01-09

## 2023-01-09 PROCEDURE — 17313 MOHS 1 STAGE T/A/L: CPT | Mod: 79

## 2023-01-09 PROCEDURE — 14301 TIS TRNFR ANY 30.1-60 SQ CM: CPT | Mod: 79

## 2023-01-09 PROCEDURE — OTHER COUNSELING: OTHER

## 2023-01-09 PROCEDURE — OTHER MIPS QUALITY: OTHER

## 2023-01-09 PROCEDURE — 88342 IMHCHEM/IMCYTCHM 1ST ANTB: CPT | Mod: 59

## 2023-01-09 PROCEDURE — 99024 POSTOP FOLLOW-UP VISIT: CPT

## 2023-01-09 PROCEDURE — OTHER MOHS SURGERY WITH DEBULK SPECIMEN: OTHER

## 2023-01-09 PROCEDURE — 17314 MOHS ADDL STAGE T/A/L: CPT | Mod: 79

## 2023-01-09 PROCEDURE — OTHER POST-OP WOUND CHECK: OTHER

## 2023-01-09 ASSESSMENT — LOCATION ZONE DERM: LOCATION ZONE: NECK

## 2023-01-09 ASSESSMENT — LOCATION SIMPLE DESCRIPTION DERM: LOCATION SIMPLE: POSTERIOR NECK

## 2023-01-09 ASSESSMENT — LOCATION DETAILED DESCRIPTION DERM: LOCATION DETAILED: RIGHT LATERAL TRAPEZIAL NECK

## 2023-01-09 NOTE — PROCEDURE: COUNSELING
Show Follow-Up Variable: Yes
Quality 138: Melanoma: Coordination Of Care: A treatment plan was communicated to the physicians providing continuing care within one month of diagnosis outlining: diagnosis, tumor thickness and a plan for surgery or alternate care.
Quality 137: Melanoma: Continuity Of Care - Recall System: Patient information entered into a recall system that includes: target date for the next exam specified AND a process to follow up with patients regarding missed or unscheduled appointments
Detail Level: Detailed
When Should The Patient Follow-Up For Their Next Full-Body Skin Exam?: 3 Months

## 2023-01-09 NOTE — PROCEDURE: MOHS SURGERY WITH DEBULK SPECIMEN
yes Banner Transposition Flap Text: The defect edges were debeveled with a #15 scalpel blade.  Given the location of the defect and the proximity to free margins a Banner transposition flap was deemed most appropriate.  Using a sterile surgical marker, an appropriate flap drawn around the defect. The area thus outlined was incised deep to adipose tissue with a #15 scalpel blade.  The skin margins were undermined to an appropriate distance in all directions utilizing iris scissors.

## 2023-01-09 NOTE — PROCEDURE: MOHS SURGERY WITH DEBULK SPECIMEN
Summary: BP meds- potassium levels in bloodwork    Went to get bloodwork done and got results yesterday. Ivana Larios potassium level is 2.8.  It was 2.8 in November and December.  She was put on potassium pills she had an allergic reaction- canker sores, swollen lips, etc.  She went off of them.  She takes 50mg of hydrachlorothiazide for her BP.  The on call dr told her to d/c the BP pill and then recheck potassium level.  As today has worn on she continues to not feel well.  At lunch it was 124/80 and at dinner it was 143/84 and just now it was 154/92.  Her ears are ringing and face feels flushed.  What should she do?  Stay off BP meds until potassium recheck? Luis Mistry is supposed to be off them until Tuesday.  She fears she can't go that long w/o her BP meds.  Please advise. Special Stains Used Stage 1: Mart-1

## 2023-01-09 NOTE — PROCEDURE: POST-OP WOUND CHECK
Detail Level: Detailed
Add 11190 Cpt? (Important Note: In 2017 The Use Of 93828 Is Being Tracked By Cms To Determine Future Global Period Reimbursement For Global Periods): yes
Wound Evaluated By: Dr. Edmar Mora
Additional Comments: Patient presents for one week wound check following Mohs surgery with a Mercedes Flap repair. Flap site appears to be healing within normal limits, well approximated, well contoured, without signs/symptoms of infection or poor wound healing. Patient denies any complaints or concerns. Site cleaned with surgical soap and dressed with Vaseline, Telfa Pad, and Hypafix tape. Reviewed with patient continued wound care instructions; patient can begin daily dressing changes as instructed.  Reviewed expected appearance as scar line matures over coming weeks to months reviewed in detail. Begin massage at 5 weeks and to continue to monitor for signs/symptoms of infection. Patient to follow up in 2-3 weeks or sooner if concerns arise.

## 2023-01-23 ENCOUNTER — APPOINTMENT (OUTPATIENT)
Dept: URBAN - METROPOLITAN AREA SURGERY 18 | Age: 78
Setting detail: DERMATOLOGY
End: 2023-01-26

## 2023-01-23 DIAGNOSIS — Z48.817 ENCOUNTER FOR SURGICAL AFTERCARE FOLLOWING SURGERY ON THE SKIN AND SUBCUTANEOUS TISSUE: ICD-10-CM

## 2023-01-23 PROCEDURE — OTHER POST-OP WOUND CHECK: OTHER

## 2023-01-23 PROCEDURE — 99024 POSTOP FOLLOW-UP VISIT: CPT

## 2023-01-23 ASSESSMENT — LOCATION SIMPLE DESCRIPTION DERM: LOCATION SIMPLE: RIGHT PRETIBIAL REGION

## 2023-01-23 ASSESSMENT — LOCATION ZONE DERM: LOCATION ZONE: LEG

## 2023-01-23 ASSESSMENT — LOCATION DETAILED DESCRIPTION DERM: LOCATION DETAILED: RIGHT LATERAL DISTAL PRETIBIAL REGION

## 2023-01-23 NOTE — PROCEDURE: POST-OP WOUND CHECK
Additional Comments: Patient presents for two week wound check. Flap site well vascularized, medial portion of wound was left open to heal by secondary intention. Wound cleaned with surgical soap, sutures removed.  Vaseline, non adherent pad, tape and coban applied to wound.  Patient advised to purchase compression socks to decrease edema to leg and to continue daily dressing changes until the wound heals completely. Patient educated on importance of elevating leg as much as possible. Follow up 4-6 weeks.

## 2023-01-23 NOTE — PROCEDURE: POST-OP WOUND CHECK
Add 47574 Cpt? (Important Note: In 2017 The Use Of 02088 Is Being Tracked By Cms To Determine Future Global Period Reimbursement For Global Periods): yes

## 2023-01-30 ENCOUNTER — RX ONLY (RX ONLY)
Age: 78
End: 2023-01-30

## 2023-01-30 RX ORDER — MUPIROCIN 20 MG/G
OINTMENT TOPICAL
Qty: 22 | Refills: 0 | Status: ERX | COMMUNITY
Start: 2023-01-30

## 2023-02-08 ENCOUNTER — APPOINTMENT (OUTPATIENT)
Dept: URBAN - METROPOLITAN AREA SURGERY 18 | Age: 78
Setting detail: DERMATOLOGY
End: 2023-02-09

## 2023-02-08 DIAGNOSIS — Z48.817 ENCOUNTER FOR SURGICAL AFTERCARE FOLLOWING SURGERY ON THE SKIN AND SUBCUTANEOUS TISSUE: ICD-10-CM

## 2023-02-08 PROCEDURE — OTHER POST-OP WOUND CHECK: OTHER

## 2023-02-08 PROCEDURE — 99024 POSTOP FOLLOW-UP VISIT: CPT

## 2023-02-08 ASSESSMENT — LOCATION SIMPLE DESCRIPTION DERM: LOCATION SIMPLE: RIGHT LOWER LEG

## 2023-02-08 ASSESSMENT — LOCATION DETAILED DESCRIPTION DERM: LOCATION DETAILED: RIGHT DISTAL LATERAL PRETIBIAL REGION

## 2023-02-08 ASSESSMENT — LOCATION ZONE DERM: LOCATION ZONE: LEG

## 2023-02-08 NOTE — PROCEDURE: POST-OP WOUND CHECK
Detail Level: Detailed
Add 52445 Cpt? (Important Note: In 2017 The Use Of 78261 Is Being Tracked By Cms To Determine Future Global Period Reimbursement For Global Periods): yes
Wound Evaluated By: Dr. Edmar Mora
Additional Comments: Patient presents for a 4.5 week post Mohs wound check. Surgical site ended up fully dehiscing, but is clean and granulating appropriately. She has been applying mupirocin 2% ointment for about 8 days now and still notes some mild, but much improved tenderness. No signs/symptoms of infection present on exam. Wound was cleaned with hibiclens and rinsed with sterile saline. Advised patient to continue applying mupirocin through the weekend and then switch back to Vaseline. Discussed trying vinegar soaks for about a week. Applied Vaseline, telfa, and hypafix tape. Patient to follow-up as scheduled in 3.5-4 weeks or call sooner should she have any questions or concerns.

## 2023-03-06 ENCOUNTER — APPOINTMENT (OUTPATIENT)
Dept: URBAN - METROPOLITAN AREA SURGERY 18 | Age: 78
Setting detail: DERMATOLOGY
End: 2023-03-07

## 2023-03-06 DIAGNOSIS — Z48.817 ENCOUNTER FOR SURGICAL AFTERCARE FOLLOWING SURGERY ON THE SKIN AND SUBCUTANEOUS TISSUE: ICD-10-CM

## 2023-03-06 PROCEDURE — OTHER POST-OP WOUND CHECK: OTHER

## 2023-03-06 PROCEDURE — 99024 POSTOP FOLLOW-UP VISIT: CPT

## 2023-03-06 ASSESSMENT — LOCATION ZONE DERM: LOCATION ZONE: LEG

## 2023-03-06 ASSESSMENT — LOCATION SIMPLE DESCRIPTION DERM: LOCATION SIMPLE: RIGHT PRETIBIAL REGION

## 2023-03-06 ASSESSMENT — LOCATION DETAILED DESCRIPTION DERM: LOCATION DETAILED: RIGHT LATERAL DISTAL PRETIBIAL REGION

## 2023-03-06 NOTE — PROCEDURE: POST-OP WOUND CHECK
Detail Level: Detailed
Add 34300 Cpt? (Important Note: In 2017 The Use Of 32956 Is Being Tracked By Cms To Determine Future Global Period Reimbursement For Global Periods): yes
Wound Evaluated By: Dr. Edmar Mora
Additional Comments: Patient presents for an 8 week post Mohs wound check that had dehisced. Surgical site is clean, granulating appropriately, and healing well. Wound was cleaned with hibiclens and rinsed with sterile saline. Applied Vaseline, telfa, and hypafix tape. Advised patient to discontinue vinegar soaks and go back to daily cleaning with soap and water and regular dressing changes until fully healed. Patient to follow-up in 6 weeks or as needed.

## 2023-03-07 NOTE — PROCEDURE: MOHS SURGERY
Ana Manzano requests that nurse  return their call. The best time to reach her is Anytime. Patient called patient insurance sending a form for her medication so she can get the medication free. Patient said the form was sent 3-7-23 and also she gave Dr Foster Collins at her last appointment a paper to be filled out so she get the medication . Thank you. Partial Purse String (Intermediate) Text: Given the location of the defect and the characteristics of the surrounding skin an intermediate purse string closure was deemed most appropriate.  Undermining was performed circumferentially around the surgical defect.  A purse string suture was then placed and tightened. Wound tension only allowed a partial closure of the circular defect.

## 2023-03-15 ENCOUNTER — APPOINTMENT (OUTPATIENT)
Dept: URBAN - METROPOLITAN AREA SURGERY 18 | Age: 78
Setting detail: DERMATOLOGY
End: 2023-03-19

## 2023-03-15 DIAGNOSIS — L57.0 ACTINIC KERATOSIS: ICD-10-CM

## 2023-03-15 DIAGNOSIS — D18.0 HEMANGIOMA: ICD-10-CM

## 2023-03-15 DIAGNOSIS — Z71.89 OTHER SPECIFIED COUNSELING: ICD-10-CM

## 2023-03-15 DIAGNOSIS — L81.4 OTHER MELANIN HYPERPIGMENTATION: ICD-10-CM

## 2023-03-15 DIAGNOSIS — Z85.820 PERSONAL HISTORY OF MALIGNANT MELANOMA OF SKIN: ICD-10-CM

## 2023-03-15 DIAGNOSIS — Z85.828 PERSONAL HISTORY OF OTHER MALIGNANT NEOPLASM OF SKIN: ICD-10-CM

## 2023-03-15 DIAGNOSIS — D22 MELANOCYTIC NEVI: ICD-10-CM

## 2023-03-15 DIAGNOSIS — D485 NEOPLASM OF UNCERTAIN BEHAVIOR OF SKIN: ICD-10-CM

## 2023-03-15 DIAGNOSIS — L82.1 OTHER SEBORRHEIC KERATOSIS: ICD-10-CM

## 2023-03-15 PROBLEM — D22.62 MELANOCYTIC NEVI OF LEFT UPPER LIMB, INCLUDING SHOULDER: Status: ACTIVE | Noted: 2023-03-15

## 2023-03-15 PROBLEM — D18.01 HEMANGIOMA OF SKIN AND SUBCUTANEOUS TISSUE: Status: ACTIVE | Noted: 2023-03-15

## 2023-03-15 PROBLEM — D48.5 NEOPLASM OF UNCERTAIN BEHAVIOR OF SKIN: Status: ACTIVE | Noted: 2023-03-15

## 2023-03-15 PROBLEM — D22.61 MELANOCYTIC NEVI OF RIGHT UPPER LIMB, INCLUDING SHOULDER: Status: ACTIVE | Noted: 2023-03-15

## 2023-03-15 PROBLEM — D22.71 MELANOCYTIC NEVI OF RIGHT LOWER LIMB, INCLUDING HIP: Status: ACTIVE | Noted: 2023-03-15

## 2023-03-15 PROBLEM — D22.5 MELANOCYTIC NEVI OF TRUNK: Status: ACTIVE | Noted: 2023-03-15

## 2023-03-15 PROBLEM — D22.72 MELANOCYTIC NEVI OF LEFT LOWER LIMB, INCLUDING HIP: Status: ACTIVE | Noted: 2023-03-15

## 2023-03-15 PROCEDURE — OTHER COUNSELING: OTHER

## 2023-03-15 PROCEDURE — 11102 TANGNTL BX SKIN SINGLE LES: CPT | Mod: 79

## 2023-03-15 PROCEDURE — 17000 DESTRUCT PREMALG LESION: CPT | Mod: 59,79

## 2023-03-15 PROCEDURE — OTHER SUNSCREEN RECOMMENDATIONS: OTHER

## 2023-03-15 PROCEDURE — OTHER MEDICATION COUNSELING: OTHER

## 2023-03-15 PROCEDURE — 17003 DESTRUCT PREMALG LES 2-14: CPT | Mod: 79

## 2023-03-15 PROCEDURE — OTHER TREATMENT REGIMEN: OTHER

## 2023-03-15 PROCEDURE — OTHER LIQUID NITROGEN: OTHER

## 2023-03-15 PROCEDURE — 99214 OFFICE O/P EST MOD 30 MIN: CPT | Mod: 25,24

## 2023-03-15 PROCEDURE — OTHER BIOPSY BY SHAVE METHOD: OTHER

## 2023-03-15 PROCEDURE — 11103 TANGNTL BX SKIN EA SEP/ADDL: CPT | Mod: 79

## 2023-03-15 ASSESSMENT — LOCATION DETAILED DESCRIPTION DERM
LOCATION DETAILED: RIGHT VENTRAL DISTAL FOREARM
LOCATION DETAILED: RIGHT DISTAL POSTERIOR UPPER ARM
LOCATION DETAILED: LEFT ANTERIOR PROXIMAL UPPER ARM
LOCATION DETAILED: LEFT VENTRAL PROXIMAL FOREARM
LOCATION DETAILED: LEFT POSTERIOR SHOULDER
LOCATION DETAILED: RIGHT LATERAL SUPERIOR CHEST
LOCATION DETAILED: RIGHT SUPERIOR MEDIAL MIDBACK
LOCATION DETAILED: LEFT SUPERIOR HELIX
LOCATION DETAILED: LEFT RADIAL DORSAL HAND
LOCATION DETAILED: LEFT PROXIMAL RADIAL DORSAL FOREARM
LOCATION DETAILED: RIGHT ANTERIOR DISTAL THIGH
LOCATION DETAILED: RIGHT VENTRAL PROXIMAL FOREARM
LOCATION DETAILED: RIGHT MEDIAL FOREHEAD
LOCATION DETAILED: RIGHT MEDIAL SUPERIOR CHEST
LOCATION DETAILED: MIDDLE STERNUM
LOCATION DETAILED: RIGHT LATERAL FOREHEAD
LOCATION DETAILED: LEFT PROXIMAL POSTERIOR UPPER ARM
LOCATION DETAILED: RIGHT DISTAL DORSAL FOREARM
LOCATION DETAILED: RIGHT PROXIMAL DORSAL MIDDLE FINGER
LOCATION DETAILED: RIGHT PROXIMAL DORSAL FOREARM
LOCATION DETAILED: LEFT PROXIMAL PRETIBIAL REGION
LOCATION DETAILED: LEFT MEDIAL TRAPEZIAL NECK
LOCATION DETAILED: LEFT SUPERIOR UPPER BACK
LOCATION DETAILED: LEFT DISTAL DORSAL FOREARM
LOCATION DETAILED: RIGHT SUPERIOR PARIETAL SCALP
LOCATION DETAILED: RIGHT FOREHEAD
LOCATION DETAILED: RIGHT PROXIMAL POSTERIOR UPPER ARM
LOCATION DETAILED: LEFT INFERIOR VERMILION LIP
LOCATION DETAILED: NASAL SUPRATIP
LOCATION DETAILED: LEFT ANTERIOR PROXIMAL THIGH
LOCATION DETAILED: RIGHT PROXIMAL PRETIBIAL REGION
LOCATION DETAILED: LEFT ULNAR DORSAL HAND
LOCATION DETAILED: RIGHT CENTRAL MALAR CHEEK
LOCATION DETAILED: LEFT DISTAL POSTERIOR UPPER ARM
LOCATION DETAILED: LEFT INFERIOR CENTRAL MALAR CHEEK
LOCATION DETAILED: LEFT VENTRAL LATERAL DISTAL FOREARM
LOCATION DETAILED: RIGHT ANTERIOR SHOULDER
LOCATION DETAILED: LEFT PROXIMAL DORSAL FOREARM
LOCATION DETAILED: SUPERIOR THORACIC SPINE
LOCATION DETAILED: RIGHT DORSAL WRIST

## 2023-03-15 ASSESSMENT — LOCATION SIMPLE DESCRIPTION DERM
LOCATION SIMPLE: LEFT POSTERIOR UPPER ARM
LOCATION SIMPLE: RIGHT FOREHEAD
LOCATION SIMPLE: RIGHT WRIST
LOCATION SIMPLE: NOSE
LOCATION SIMPLE: RIGHT PRETIBIAL REGION
LOCATION SIMPLE: LEFT LIP
LOCATION SIMPLE: RIGHT LOWER BACK
LOCATION SIMPLE: SCALP
LOCATION SIMPLE: LEFT PRETIBIAL REGION
LOCATION SIMPLE: LEFT EAR
LOCATION SIMPLE: POSTERIOR NECK
LOCATION SIMPLE: RIGHT FOREARM
LOCATION SIMPLE: UPPER BACK
LOCATION SIMPLE: RIGHT THIGH
LOCATION SIMPLE: LEFT UPPER BACK
LOCATION SIMPLE: RIGHT POSTERIOR UPPER ARM
LOCATION SIMPLE: RIGHT MIDDLE FINGER
LOCATION SIMPLE: RIGHT CHEEK
LOCATION SIMPLE: LEFT THIGH
LOCATION SIMPLE: LEFT SHOULDER
LOCATION SIMPLE: LEFT FOREARM
LOCATION SIMPLE: LEFT UPPER ARM
LOCATION SIMPLE: RIGHT SHOULDER
LOCATION SIMPLE: LEFT CHEEK
LOCATION SIMPLE: CHEST
LOCATION SIMPLE: LEFT HAND

## 2023-03-15 ASSESSMENT — LOCATION ZONE DERM
LOCATION ZONE: SCALP
LOCATION ZONE: FINGER
LOCATION ZONE: LEG
LOCATION ZONE: HAND
LOCATION ZONE: NECK
LOCATION ZONE: ARM
LOCATION ZONE: FACE
LOCATION ZONE: EAR
LOCATION ZONE: TRUNK
LOCATION ZONE: NOSE
LOCATION ZONE: LIP

## 2023-03-15 NOTE — HPI: MELANOMA F/U (HISTORY OF MALIGNANT MELANOMA)
What Is The Reason For Today's Visit?: History of Melanoma
Year Excised?: 2016
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

## 2023-03-15 NOTE — PROCEDURE: LIQUID NITROGEN
Consent: The patient's consent was obtained and the risks including but not limited to risks of crusting, scabbing, blistering, scarring, darker or lighter pigmentary change, recurrence, incomplete removal and infection were discussed prior to the procedure.
Show Aperture Variable?: Yes
Render Post-Care Instructions In Note?: no
Duration Of Freeze Thaw-Cycle (Seconds): 0
Post-Care Instructions: I reviewed with the patient in detail post-care instructions. Patient is to wear sunprotection, and avoid picking at any of the treated lesions. Pt may apply Vaseline to crusted or scabbing areas.
Detail Level: Detailed

## 2023-03-29 ENCOUNTER — APPOINTMENT (OUTPATIENT)
Dept: URBAN - METROPOLITAN AREA SURGERY 18 | Age: 78
Setting detail: DERMATOLOGY
End: 2023-04-03

## 2023-03-29 DIAGNOSIS — D485 NEOPLASM OF UNCERTAIN BEHAVIOR OF SKIN: ICD-10-CM

## 2023-03-29 DIAGNOSIS — L81.4 OTHER MELANIN HYPERPIGMENTATION: ICD-10-CM

## 2023-03-29 DIAGNOSIS — Z71.89 OTHER SPECIFIED COUNSELING: ICD-10-CM

## 2023-03-29 DIAGNOSIS — L24.4 IRRITANT CONTACT DERMATITIS DUE TO DRUGS IN CONTACT WITH SKIN: ICD-10-CM

## 2023-03-29 DIAGNOSIS — L57.0 ACTINIC KERATOSIS: ICD-10-CM

## 2023-03-29 DIAGNOSIS — Z85.828 PERSONAL HISTORY OF OTHER MALIGNANT NEOPLASM OF SKIN: ICD-10-CM

## 2023-03-29 DIAGNOSIS — D22 MELANOCYTIC NEVI: ICD-10-CM

## 2023-03-29 DIAGNOSIS — Z86.006 PERSONAL HISTORY OF MELANOMA IN-SITU: ICD-10-CM

## 2023-03-29 PROBLEM — D22.9 MELANOCYTIC NEVI, UNSPECIFIED: Status: ACTIVE | Noted: 2023-03-29

## 2023-03-29 PROBLEM — D48.5 NEOPLASM OF UNCERTAIN BEHAVIOR OF SKIN: Status: ACTIVE | Noted: 2023-03-29

## 2023-03-29 PROCEDURE — OTHER MEDICATION COUNSELING: OTHER

## 2023-03-29 PROCEDURE — OTHER SUNSCREEN RECOMMENDATIONS: OTHER

## 2023-03-29 PROCEDURE — 17003 DESTRUCT PREMALG LES 2-14: CPT | Mod: 79

## 2023-03-29 PROCEDURE — OTHER COUNSELING: OTHER

## 2023-03-29 PROCEDURE — OTHER TREATMENT REGIMEN: OTHER

## 2023-03-29 PROCEDURE — OTHER MIPS QUALITY: OTHER

## 2023-03-29 PROCEDURE — 99214 OFFICE O/P EST MOD 30 MIN: CPT | Mod: 25,24

## 2023-03-29 PROCEDURE — 11102 TANGNTL BX SKIN SINGLE LES: CPT | Mod: 79

## 2023-03-29 PROCEDURE — OTHER BIOPSY BY SHAVE METHOD: OTHER

## 2023-03-29 PROCEDURE — 17000 DESTRUCT PREMALG LESION: CPT | Mod: 79,59

## 2023-03-29 PROCEDURE — OTHER LIQUID NITROGEN: OTHER

## 2023-03-29 ASSESSMENT — LOCATION DETAILED DESCRIPTION DERM
LOCATION DETAILED: LEFT MEDIAL SUPERIOR CHEST
LOCATION DETAILED: LEFT PROXIMAL CALF
LOCATION DETAILED: LEFT POSTERIOR ANKLE
LOCATION DETAILED: LEFT DISTAL CALF
LOCATION DETAILED: GLABELLA
LOCATION DETAILED: LEFT ANTERIOR DISTAL THIGH
LOCATION DETAILED: RIGHT DISTAL PRETIBIAL REGION
LOCATION DETAILED: RIGHT MEDIAL PROXIMAL PRETIBIAL REGION
LOCATION DETAILED: RIGHT PROXIMAL PRETIBIAL REGION

## 2023-03-29 ASSESSMENT — LOCATION SIMPLE DESCRIPTION DERM
LOCATION SIMPLE: CHEST
LOCATION SIMPLE: RIGHT PRETIBIAL REGION
LOCATION SIMPLE: LEFT ANKLE
LOCATION SIMPLE: GLABELLA
LOCATION SIMPLE: LEFT CALF
LOCATION SIMPLE: LEFT THIGH

## 2023-03-29 ASSESSMENT — LOCATION ZONE DERM
LOCATION ZONE: LEG
LOCATION ZONE: TRUNK
LOCATION ZONE: FACE

## 2023-03-29 NOTE — PROCEDURE: MIPS QUALITY
Quality 226: Preventive Care And Screening: Tobacco Use: Screening And Cessation Intervention: Patient screened for tobacco use and is an ex/non-smoker
Detail Level: Detailed
Quality 111:Pneumonia Vaccination Status For Older Adults: Pneumococcal vaccine (PPSV23) was not administered on or after patient’s 60th birthday and before the end of the measurement period, reason not otherwise specified
Quality 431: Preventive Care And Screening: Unhealthy Alcohol Use - Screening: Patient not identified as an unhealthy alcohol user when screened for unhealthy alcohol use using a systematic screening method
Quality 47: Advance Care Plan: Advance Care Planning discussed and documented; advance care plan or surrogate decision maker documented in the medical record.

## 2023-03-29 NOTE — PROCEDURE: MEDICATION COUNSELING
Patient made aware of 24/7 emergency services. Topical Steroids Counseling: I discussed with the patient that prolonged use of topical steroids can result in the increased appearance of superficial blood vessels (telangiectasias), lightening (hypopigmentation) and thinning of the skin (atrophy).  Patient understands to avoid using high potency steroids in skin folds, the groin or the face.  The patient verbalized understanding of the proper use and possible adverse effects of topical steroids.  All of the patient's questions and concerns were addressed.

## 2023-03-29 NOTE — PROCEDURE: LIQUID NITROGEN
Show Aperture Variable?: Yes
Consent: The patient's consent was obtained and the risks including but not limited to risks of crusting, scabbing, blistering, scarring, darker or lighter pigmentary change, recurrence, incomplete removal and infection were discussed prior to the procedure.
Render Post-Care Instructions In Note?: no
Duration Of Freeze Thaw-Cycle (Seconds): 0
Post-Care Instructions: I reviewed with the patient in detail post-care instructions. Patient is to wear sunprotection, and avoid picking at any of the treated lesions. Pt may apply Vaseline to crusted or scabbing areas.
Detail Level: Detailed

## 2023-03-29 NOTE — PROCEDURE: TREATMENT REGIMEN
Detail Level: Zone
Continue Regimen: Efudex cream BID for up to 1 more week to forehead, temples
Initiate Treatment: Efudex cream BID to the nose x 2 weeks, then stop
Discontinue Regimen: Efudex cream BID to the chest

## 2023-04-05 ENCOUNTER — APPOINTMENT (OUTPATIENT)
Dept: URBAN - METROPOLITAN AREA SURGERY 18 | Age: 78
Setting detail: DERMATOLOGY
End: 2023-04-09

## 2023-04-05 DIAGNOSIS — Z71.89 OTHER SPECIFIED COUNSELING: ICD-10-CM

## 2023-04-05 PROBLEM — D04.5 CARCINOMA IN SITU OF SKIN OF TRUNK: Status: ACTIVE | Noted: 2023-04-05

## 2023-04-05 PROBLEM — D04.71 CARCINOMA IN SITU OF SKIN OF RIGHT LOWER LIMB, INCLUDING HIP: Status: ACTIVE | Noted: 2023-04-05

## 2023-04-05 PROBLEM — D04.62 CARCINOMA IN SITU OF SKIN OF LEFT UPPER LIMB, INCLUDING SHOULDER: Status: ACTIVE | Noted: 2023-04-05

## 2023-04-05 PROCEDURE — OTHER COUNSELING: OTHER

## 2023-04-05 PROCEDURE — 17263 DSTRJ MAL LES T/A/L 2.1-3.0: CPT | Mod: 79

## 2023-04-05 PROCEDURE — OTHER MIPS QUALITY: OTHER

## 2023-04-05 PROCEDURE — OTHER CURETTAGE AND DESTRUCTION: OTHER

## 2023-04-05 PROCEDURE — 17262 DSTRJ MAL LES T/A/L 1.1-2.0: CPT | Mod: 79

## 2023-04-05 PROCEDURE — 17263 DSTRJ MAL LES T/A/L 2.1-3.0: CPT | Mod: 79,76

## 2023-04-05 ASSESSMENT — LOCATION ZONE DERM: LOCATION ZONE: TRUNK

## 2023-04-05 ASSESSMENT — LOCATION DETAILED DESCRIPTION DERM: LOCATION DETAILED: LEFT SUPERIOR UPPER BACK

## 2023-04-05 ASSESSMENT — LOCATION SIMPLE DESCRIPTION DERM: LOCATION SIMPLE: LEFT UPPER BACK

## 2023-04-18 ENCOUNTER — APPOINTMENT (OUTPATIENT)
Dept: URBAN - METROPOLITAN AREA SURGERY 18 | Age: 78
Setting detail: DERMATOLOGY
End: 2023-04-19

## 2023-04-18 VITALS — SYSTOLIC BLOOD PRESSURE: 115 MMHG | HEART RATE: 81 BPM | DIASTOLIC BLOOD PRESSURE: 68 MMHG

## 2023-04-18 DIAGNOSIS — Z48.817 ENCOUNTER FOR SURGICAL AFTERCARE FOLLOWING SURGERY ON THE SKIN AND SUBCUTANEOUS TISSUE: ICD-10-CM

## 2023-04-18 PROBLEM — C44.629 SQUAMOUS CELL CARCINOMA OF SKIN OF LEFT UPPER LIMB, INCLUDING SHOULDER: Status: ACTIVE | Noted: 2023-04-18

## 2023-04-18 PROCEDURE — 11602 EXC TR-EXT MAL+MARG 1.1-2 CM: CPT

## 2023-04-18 PROCEDURE — 12032 INTMD RPR S/A/T/EXT 2.6-7.5: CPT

## 2023-04-18 PROCEDURE — OTHER MIPS QUALITY: OTHER

## 2023-04-18 PROCEDURE — OTHER POST-OP WOUND CHECK (NO GLOBAL PERIOD): OTHER

## 2023-04-18 PROCEDURE — OTHER EXCISION: OTHER

## 2023-04-18 PROCEDURE — OTHER COUNSELING: OTHER

## 2023-04-18 ASSESSMENT — LOCATION SIMPLE DESCRIPTION DERM: LOCATION SIMPLE: LEFT UPPER BACK

## 2023-04-18 ASSESSMENT — LOCATION ZONE DERM: LOCATION ZONE: TRUNK

## 2023-04-18 ASSESSMENT — LOCATION DETAILED DESCRIPTION DERM: LOCATION DETAILED: LEFT SUPERIOR MEDIAL UPPER BACK

## 2023-04-18 NOTE — PROCEDURE: EXCISION
Marlyn,    Please see mychart.     Thanks,  WALDO Perez  [facility]        Paramedian Forehead Flap Text: A decision was made to reconstruct the defect utilizing an interpolation axial flap and a staged reconstruction.  A telfa template was made of the defect.  This telfa template was then used to outline the paramedian forehead pedicle flap.  The donor area for the pedicle flap was then injected with anesthesia.  The flap was excised through the skin and subcutaneous tissue down to the layer of the underlying musculature.  The pedicle flap was carefully excised within this deep plane to maintain its blood supply.  The edges of the donor site were undermined.   The donor site was closed in a primary fashion.  The pedicle was then rotated into position and sutured.  Once the tube was sutured into place, adequate blood supply was confirmed with blanching and refill.  The pedicle was then wrapped with xeroform gauze and dressed appropriately with a telfa and gauze bandage to ensure continued blood supply and protect the attached pedicle.

## 2023-04-18 NOTE — PROCEDURE: POST-OP WOUND CHECK (NO GLOBAL PERIOD)
Detail Level: Detailed
Wound Evaluated By: Edmar Mora MD
Additional Comments: Patient requested bandage change on ED&C site from 4/5/2023 since she is unable to see the progress of its healing. Reassured patient and instructed her to continue daily Vaseline and dressing using bandaging that does not irritate skin until site is fully healed.

## 2023-04-18 NOTE — PROCEDURE: MIPS QUALITY
Quality 111:Pneumonia Vaccination Status For Older Adults: Pneumococcal vaccine (PPSV23) administered on or after patient’s 60th birthday and before the end of the measurement period
Detail Level: Detailed
Quality 110: Preventive Care And Screening: Influenza Immunization: Influenza immunization was not ordered or administered, reason not given
Quality 226: Preventive Care And Screening: Tobacco Use: Screening And Cessation Intervention: Patient screened for tobacco use and is an ex/non-smoker

## 2023-04-18 NOTE — PROCEDURE: EXCISION
Complex Repair And Double M Plasty Text: The defect edges were debeveled with a #15 scalpel blade.  The primary defect was closed partially with a complex linear closure.  Given the location of the remaining defect, shape of the defect and the proximity to free margins a double M plasty was deemed most appropriate for complete closure of the defect.  Using a sterile surgical marker, an appropriate advancement flap was drawn incorporating the defect and placing the expected incisions within the relaxed skin tension lines where possible.    The area thus outlined was incised deep to adipose tissue with a #15 scalpel blade.  The skin margins were undermined to an appropriate distance in all directions utilizing iris scissors. Rituxan Counseling:  I discussed with the patient the risks of Rituxan infusions. Side effects can include infusion reactions, severe drug rashes including mucocutaneous reactions, reactivation of latent hepatitis and other infections and rarely progressive multifocal leukoencephalopathy.  All of the patient's questions and concerns were addressed.

## 2023-04-27 ENCOUNTER — APPOINTMENT (OUTPATIENT)
Dept: URBAN - METROPOLITAN AREA SURGERY 18 | Age: 78
Setting detail: DERMATOLOGY
End: 2023-04-30

## 2023-04-27 DIAGNOSIS — Z71.89 OTHER SPECIFIED COUNSELING: ICD-10-CM

## 2023-04-27 PROBLEM — D04.72 CARCINOMA IN SITU OF SKIN OF LEFT LOWER LIMB, INCLUDING HIP: Status: ACTIVE | Noted: 2023-04-27

## 2023-04-27 PROCEDURE — 17262 DSTRJ MAL LES T/A/L 1.1-2.0: CPT | Mod: 79

## 2023-04-27 PROCEDURE — OTHER DIAGNOSIS COMMENT: OTHER

## 2023-04-27 PROCEDURE — OTHER COUNSELING: OTHER

## 2023-04-27 PROCEDURE — OTHER MIPS QUALITY: OTHER

## 2023-04-27 PROCEDURE — OTHER CURETTAGE AND DESTRUCTION: OTHER

## 2023-04-27 ASSESSMENT — LOCATION SIMPLE DESCRIPTION DERM: LOCATION SIMPLE: ABDOMEN

## 2023-04-27 ASSESSMENT — LOCATION DETAILED DESCRIPTION DERM: LOCATION DETAILED: EPIGASTRIC SKIN

## 2023-04-27 ASSESSMENT — LOCATION ZONE DERM: LOCATION ZONE: TRUNK

## 2023-04-27 NOTE — PROCEDURE: DIAGNOSIS COMMENT
Comment: [id number].  Reviewed diagnosis and risks/benefits and outcomes of all possible treatment options.  All questions answered and pt verbalized understanding.  I recommended ED&C and pt agreed with plan. Comment: [id number].  Reviewed diagnosis and risks/benefits and outcomes of all possible treatment options.  All questions answered and pt verbalized understanding.  I recommended ED&C and pt agreed with plan.

## 2023-04-27 NOTE — PROCEDURE: MIPS QUALITY
Quality 137: Melanoma: Continuity Of Care - Recall System: Patient information entered into a recall system that includes: target date for the next exam specified AND a process to follow up with patients regarding missed or unscheduled appointments
Quality 226: Preventive Care And Screening: Tobacco Use: Screening And Cessation Intervention: Patient screened for tobacco use and is an ex/non-smoker
Quality 111:Pneumonia Vaccination Status For Older Adults: Pneumococcal vaccine (PPSV23) was not administered on or after patient’s 60th birthday and before the end of the measurement period, reason not otherwise specified
Quality 138: Melanoma: Coordination Of Care: A treatment plan was communicated to the physicians providing continuing care within one month of diagnosis outlining: diagnosis, tumor thickness and a plan for surgery or alternate care.
Detail Level: Detailed
Quality 431: Preventive Care And Screening: Unhealthy Alcohol Use - Screening: Patient not identified as an unhealthy alcohol user when screened for unhealthy alcohol use using a systematic screening method
Quality 47: Advance Care Plan: Advance Care Planning discussed and documented; advance care plan or surrogate decision maker documented in the medical record.

## 2023-04-27 NOTE — PROCEDURE: CURETTAGE AND DESTRUCTION
Detail Level: Detailed
Number Of Curettages: 3
Size Of Lesion In Cm: 0.9
Size Of Lesion After Curettage: 1.7
Add Intralesional Injection: No
Concentration (Mg/Ml Or Millions Of Plaque Forming Units/Cc): 0.01
Total Volume (Ccs): 1
Anesthesia Type: 1% lidocaine with epinephrine
Anesthesia Volume In Cc: 1.8
Cautery Type: electrodesiccation
What Was Performed First?: Curettage
Final Size Statement: The size of the lesion after curettage was
Additional Information: (Optional): The wound was cleaned, and a pressure dressing was applied.  The patient received detailed post-op instructions.
Consent was obtained from the patient. The risks, benefits and alternatives to therapy were discussed in detail. Specifically, the risks of infection, scarring, bleeding, prolonged wound healing, nerve injury, incomplete removal, allergy to anesthesia and recurrence were addressed. Alternatives to ED&C, such as: surgical removal and XRT were also discussed.  Prior to the procedure, the treatment site was clearly identified and confirmed by the patient. All components of Universal Protocol/PAUSE Rule completed.
Post-Care Instructions: I reviewed with the patient in detail post-care instructions. Patient is to keep the area dry for 48 hours, and not to engage in any swimming until the area is healed. Should the patient develop any fevers, chills, bleeding, severe pain patient will contact the office immediately.
Bill As A Line Item Or As Units: Line Item

## 2023-06-05 ENCOUNTER — APPOINTMENT (OUTPATIENT)
Dept: URBAN - METROPOLITAN AREA SURGERY 18 | Age: 78
Setting detail: DERMATOLOGY
End: 2023-06-05

## 2023-06-05 VITALS — SYSTOLIC BLOOD PRESSURE: 137 MMHG | DIASTOLIC BLOOD PRESSURE: 74 MMHG | HEART RATE: 77 BPM

## 2023-06-05 PROBLEM — C44.329 SQUAMOUS CELL CARCINOMA OF SKIN OF OTHER PARTS OF FACE: Status: ACTIVE | Noted: 2023-06-05

## 2023-06-05 PROCEDURE — OTHER COUNSELING: OTHER

## 2023-06-05 PROCEDURE — OTHER MOHS SURGERY: OTHER

## 2023-06-05 PROCEDURE — 17311 MOHS 1 STAGE H/N/HF/G: CPT

## 2023-06-05 PROCEDURE — 13132 CMPLX RPR F/C/C/M/N/AX/G/H/F: CPT

## 2023-06-05 PROCEDURE — OTHER MIPS QUALITY: OTHER

## 2023-06-05 NOTE — PROCEDURE: MOHS SURGERY
Brow Lift Text: A midfrontal incision was made medially to the defect to allow access to the tissues just superior to the left eyebrow. Following careful dissection inferiorly in a supraperiosteal plane to the level of the left eyebrow, several 3-0 monocryl sutures were used to resuspend the eyebrow orbicularis oculi muscular unit to the superior frontal bone periosteum. This resulted in an appropriate reapproximation of static eyebrow symmetry and correction of the left brow ptosis. Klisyri Counseling:  I discussed with the patient the risks of Klisyri including but not limited to erythema, scaling, itching, weeping, crusting, and pain.

## 2023-06-05 NOTE — PROCEDURE: MIPS QUALITY
Quality 111:Pneumonia Vaccination Status For Older Adults: Patient received any pneumococcal conjugate or polysaccharide vaccine on or after their 60th birthday and before the end of the measurement period
Quality 226: Preventive Care And Screening: Tobacco Use: Screening And Cessation Intervention: Patient screened for tobacco use and is an ex/non-smoker
Detail Level: Detailed
Quality 47: Advance Care Plan: Advance Care Planning discussed and documented; advance care plan or surrogate decision maker documented in the medical record.
Quality 110: Preventive Care And Screening: Influenza Immunization: Influenza Immunization Administered during Influenza season

## 2023-06-05 NOTE — PROCEDURE: MOHS SURGERY
lidocaine (LMX4) 4 % external cream   Please send 5%   The current rx is OTC and is not covered by insurance     Localized Dermabrasion With Wire Brush Text: The patient was draped in routine manner.  Localized dermabrasion using 3 x 17 mm wire brush was performed in routine manner to papillary dermis. This spot dermabrasion is being performed to complete skin cancer reconstruction. It also will eliminate the other sun damaged precancerous cells that are known to be part of the regional effect of a lifetime's worth of sun exposure. This localized dermabrasion is therapeutic and should not be considered cosmetic in any regard. Localized Dermabrasion Text: The patient was draped in routine manner.  Localized dermabrasion using 3 x 17 mm wire brush was performed in routine manner to papillary dermis. This spot dermabrasion is being performed to complete skin cancer reconstruction. It also will eliminate the other sun damaged precancerous cells that are known to be part of the regional effect of a lifetime's worth of sun exposure. This localized dermabrasion is therapeutic and should not be considered cosmetic in any regard.

## 2023-06-08 NOTE — PROCEDURE: LIQUID NITROGEN
Never
Show Aperture Variable?: Yes
Post-Care Instructions: I reviewed with the patient in detail post-care instructions. Patient is to wear sunprotection, and avoid picking at any of the treated lesions. Pt may apply Vaseline to crusted or scabbing areas.
Include Z78.9 (Other Specified Conditions Influencing Health Status) As An Associated Diagnosis?: No
Spray Paint Text: The liquid nitrogen was applied to the skin utilizing a spray paint frosting technique.
Medical Necessity Clause: This procedure was medically necessary because the lesions that were treated were:
Detail Level: Detailed
Medical Necessity Information: It is in your best interest to select a reason for this procedure from the list below. All of these items fulfill various CMS LCD requirements except the new and changing color options.
Consent: The patient's consent was obtained including but not limited to risks of crusting, scabbing, blistering, scarring, darker or lighter pigmentary change, recurrence, incomplete removal and infection.
Duration Of Freeze Thaw-Cycle (Seconds): 0

## 2023-06-15 ENCOUNTER — APPOINTMENT (OUTPATIENT)
Dept: URBAN - METROPOLITAN AREA SURGERY 18 | Age: 78
Setting detail: DERMATOLOGY
End: 2023-06-19

## 2023-06-15 DIAGNOSIS — D485 NEOPLASM OF UNCERTAIN BEHAVIOR OF SKIN: ICD-10-CM

## 2023-06-15 DIAGNOSIS — L82.1 OTHER SEBORRHEIC KERATOSIS: ICD-10-CM

## 2023-06-15 DIAGNOSIS — L81.4 OTHER MELANIN HYPERPIGMENTATION: ICD-10-CM

## 2023-06-15 DIAGNOSIS — Z71.89 OTHER SPECIFIED COUNSELING: ICD-10-CM

## 2023-06-15 DIAGNOSIS — L57.0 ACTINIC KERATOSIS: ICD-10-CM

## 2023-06-15 DIAGNOSIS — Z85.828 PERSONAL HISTORY OF OTHER MALIGNANT NEOPLASM OF SKIN: ICD-10-CM

## 2023-06-15 DIAGNOSIS — D18.0 HEMANGIOMA: ICD-10-CM

## 2023-06-15 DIAGNOSIS — D22 MELANOCYTIC NEVI: ICD-10-CM

## 2023-06-15 DIAGNOSIS — Z85.820 PERSONAL HISTORY OF MALIGNANT MELANOMA OF SKIN: ICD-10-CM

## 2023-06-15 PROBLEM — D18.01 HEMANGIOMA OF SKIN AND SUBCUTANEOUS TISSUE: Status: ACTIVE | Noted: 2023-06-15

## 2023-06-15 PROBLEM — D48.5 NEOPLASM OF UNCERTAIN BEHAVIOR OF SKIN: Status: ACTIVE | Noted: 2023-06-15

## 2023-06-15 PROBLEM — D22.5 MELANOCYTIC NEVI OF TRUNK: Status: ACTIVE | Noted: 2023-06-15

## 2023-06-15 PROCEDURE — 99213 OFFICE O/P EST LOW 20 MIN: CPT | Mod: 24,25

## 2023-06-15 PROCEDURE — OTHER COUNSELING: OTHER

## 2023-06-15 PROCEDURE — OTHER MIPS QUALITY: OTHER

## 2023-06-15 PROCEDURE — OTHER LIQUID NITROGEN: OTHER

## 2023-06-15 PROCEDURE — OTHER BIOPSY BY SHAVE METHOD: OTHER

## 2023-06-15 PROCEDURE — 11103 TANGNTL BX SKIN EA SEP/ADDL: CPT | Mod: 79

## 2023-06-15 PROCEDURE — 17003 DESTRUCT PREMALG LES 2-14: CPT | Mod: 79

## 2023-06-15 PROCEDURE — 11102 TANGNTL BX SKIN SINGLE LES: CPT | Mod: 79

## 2023-06-15 PROCEDURE — OTHER SUNSCREEN RECOMMENDATIONS: OTHER

## 2023-06-15 PROCEDURE — 17000 DESTRUCT PREMALG LESION: CPT | Mod: 59,79

## 2023-06-15 ASSESSMENT — LOCATION ZONE DERM
LOCATION ZONE: NECK
LOCATION ZONE: FACE
LOCATION ZONE: LEG
LOCATION ZONE: ARM
LOCATION ZONE: SCALP
LOCATION ZONE: TRUNK

## 2023-06-15 ASSESSMENT — LOCATION SIMPLE DESCRIPTION DERM
LOCATION SIMPLE: RIGHT CLAVICULAR SKIN
LOCATION SIMPLE: RIGHT PRETIBIAL REGION
LOCATION SIMPLE: RIGHT THIGH
LOCATION SIMPLE: LEFT CALF
LOCATION SIMPLE: LEFT PRETIBIAL REGION
LOCATION SIMPLE: LEFT BREAST
LOCATION SIMPLE: UPPER BACK
LOCATION SIMPLE: RIGHT UPPER BACK
LOCATION SIMPLE: RIGHT FOREARM
LOCATION SIMPLE: ABDOMEN
LOCATION SIMPLE: SCALP
LOCATION SIMPLE: RIGHT FOREHEAD
LOCATION SIMPLE: LEFT FOREARM
LOCATION SIMPLE: INFERIOR FOREHEAD
LOCATION SIMPLE: LEFT POSTERIOR THIGH
LOCATION SIMPLE: RIGHT POPLITEAL SKIN
LOCATION SIMPLE: LEFT THIGH
LOCATION SIMPLE: NECK

## 2023-06-15 ASSESSMENT — LOCATION DETAILED DESCRIPTION DERM
LOCATION DETAILED: RIGHT POPLITEAL SKIN
LOCATION DETAILED: RIGHT PROXIMAL PRETIBIAL REGION
LOCATION DETAILED: SUBXIPHOID
LOCATION DETAILED: LEFT DISTAL POSTERIOR THIGH
LOCATION DETAILED: RIGHT PROXIMAL DORSAL FOREARM
LOCATION DETAILED: RIGHT FOREHEAD
LOCATION DETAILED: RIGHT ANTERIOR DISTAL THIGH
LOCATION DETAILED: RIGHT MEDIAL UPPER BACK
LOCATION DETAILED: RIGHT INFERIOR MEDIAL UPPER BACK
LOCATION DETAILED: LEFT SUPERIOR FRONTAL SCALP
LOCATION DETAILED: LEFT ANTERIOR DISTAL THIGH
LOCATION DETAILED: LEFT PROXIMAL DORSAL FOREARM
LOCATION DETAILED: LEFT MEDIAL BREAST 9-10:00 REGION
LOCATION DETAILED: INFERIOR MID FOREHEAD
LOCATION DETAILED: LEFT PROXIMAL CALF
LOCATION DETAILED: LEFT VENTRAL LATERAL PROXIMAL FOREARM
LOCATION DETAILED: RIGHT VENTRAL DISTAL FOREARM
LOCATION DETAILED: LEFT PROXIMAL PRETIBIAL REGION
LOCATION DETAILED: RIGHT VENTRAL MEDIAL PROXIMAL FOREARM
LOCATION DETAILED: RIGHT CLAVICULAR SKIN
LOCATION DETAILED: LEFT VENTRAL DISTAL FOREARM
LOCATION DETAILED: LEFT CENTRAL LATERAL NECK
LOCATION DETAILED: SUPERIOR THORACIC SPINE

## 2023-06-15 NOTE — PROCEDURE: MIPS QUALITY
Quality 47: Advance Care Plan: Advance Care Planning discussed and documented; advance care plan or surrogate decision maker documented in the medical record.
Quality 138: Melanoma: Coordination Of Care: A treatment plan was communicated to the physicians providing continuing care within one month of diagnosis outlining: diagnosis, tumor thickness and a plan for surgery or alternate care.
Quality 226: Preventive Care And Screening: Tobacco Use: Screening And Cessation Intervention: Patient screened for tobacco use and is an ex/non-smoker
Quality 111:Pneumonia Vaccination Status For Older Adults: Patient did not receive any pneumococcal conjugate or polysaccharide vaccine on or after their 60th birthday and before the end of the measurement period
Detail Level: Detailed
Quality 137: Melanoma: Continuity Of Care - Recall System: Patient information entered into a recall system that includes: target date for the next exam specified AND a process to follow up with patients regarding missed or unscheduled appointments
Quality 431: Preventive Care And Screening: Unhealthy Alcohol Use - Screening: Patient not identified as an unhealthy alcohol user when screened for unhealthy alcohol use using a systematic screening method

## 2023-06-15 NOTE — HPI: MELANOMA F/U (HISTORY OF MALIGNANT MELANOMA)
What Is The Reason For Today's Visit?: History of Melanoma
Year Excised?: 2016 on R forearm and an MIS on R pretib region in 2022

## 2023-06-15 NOTE — PROCEDURE: LIQUID NITROGEN
Detail Level: Detailed
Render Post-Care Instructions In Note?: no
Show Applicator Variable?: Yes
Consent: The patient's consent was obtained and the risks including but not limited to risks of crusting, scabbing, blistering, scarring, darker or lighter pigmentary change, recurrence, incomplete removal and infection were discussed prior to the procedure.
Post-Care Instructions: I reviewed with the patient in detail post-care instructions. Patient is to wear sunprotection, and avoid picking at any of the treated lesions. Pt may apply Vaseline to crusted or scabbing areas.
Duration Of Freeze Thaw-Cycle (Seconds): 0

## 2023-07-03 NOTE — PROCEDURE: BIOPSY BY SHAVE METHOD
[facility]        NAME: Edson Franz is a 27 y.o. female  : 1990    MRN: [id number]  DATE: July 3, 2023  TIME: 1:26 PM    Assessment and Plan   Acute cough [R05.1]  1. Acute cough  azithromycin (ZITHROMAX) 250 mg tablet      2. Conjunctivitis of both eyes, unspecified conjunctivitis type  ofloxacin (OCUFLOX) 0.3 % ophthalmic solution            Patient Instructions       Patient was educated on antibiotics eye drops. Patient was told to discontinue contacts. Patient was educated on acute cough. Patient was prescribed antibiotics. Patient was told to eat on antibiotics. Patient was educated on Delsym for cough. Chief Complaint     Chief Complaint   Patient presents with   • Cough     Pt reports cough x8 days as well as PND. Home COVID test was negative. History of Present Illness       Patient is here today complaining of cough for over 1 week. Patient reports cough is wet and dry. Patient also reports B/L eye redness. Patient reports she has been wearing same contacts for 1 week. Patient admits allergy to Metronidazole. Review of Systems   Review of Systems   Constitutional: Negative. Eyes: Positive for redness. Respiratory: Positive for cough. Cardiovascular: Negative. Psychiatric/Behavioral: Negative.           Current Medications       Current Outpatient Medications:   •  azithromycin (ZITHROMAX) 250 mg tablet, Take 2 tablets today then 1 tablet daily x 4 days, Disp: 6 tablet, Rfl: 0  •  ofloxacin (OCUFLOX) 0.3 % ophthalmic solution, Administer 1 drop to both eyes 4 (four) times a day for 7 days, Disp: 5 mL, Rfl: 0  •  EPINEPHrine (EPIPEN) 0.3 mg/0.3 mL SOAJ, Inject 0.3 mL (0.3 mg total) into a muscle once for 1 dose, Disp: 0.3 mL, Rfl: 0  •  lisinopril-hydrochlorothiazide (PRINZIDE,ZESTORETIC) 20-25 MG per tablet, TAKE 1 TABLET BY MOUTH EVERY DAY, Disp: 90 tablet, Rfl: 0  •  Slynd 4 MG TABS, TAKE 1 TABLET BY MOUTH EVERY DAY, Disp: 84 tablet, Rfl: 0    Current Allergies     Allergies as of 07/03/2023 - Reviewed 07/03/2023   Allergen Reaction Noted   • Metronidazole Rash 12/24/2020            The following portions of the patient's history were reviewed and updated as appropriate: allergies, current medications, past family history, past medical history, past social history, past surgical history and problem list.     Past Medical History:   Diagnosis Date   • Chlamydia 2013   • Hypertension    • Mitral valve prolapse    • Rectal bleeding        Past Surgical History:   Procedure Laterality Date   • WISDOM TOOTH EXTRACTION         Family History   Problem Relation Age of Onset   • Hypertension Mother    • No Known Problems Father    • No Known Problems Sister          Medications have been verified. Objective   /84   Pulse 86   Temp 99.1 °F (37.3 °C)   Resp 16   SpO2 100%   No LMP recorded. (Menstrual status: Amenorrheic other). Physical Exam     Physical Exam  Vitals and nursing note reviewed. HENT:      Head: Normocephalic. Right Ear: Tympanic membrane, ear canal and external ear normal.      Left Ear: Tympanic membrane, ear canal and external ear normal.      Mouth/Throat:      Mouth: Mucous membranes are moist.      Pharynx: No posterior oropharyngeal erythema. Eyes:      Pupils: Pupils are equal, round, and reactive to light. Comments: Right and left sclera injected   Cardiovascular:      Rate and Rhythm: Normal rate and regular rhythm. Heart sounds: Normal heart sounds. Pulmonary:      Breath sounds: Normal breath sounds. No wheezing. Neurological:      General: No focal deficit present. Mental Status: She is alert and oriented to person, place, and time.    Psychiatric:         Mood and Affect: Mood normal.         Behavior: Behavior normal. Curettage Text: The wound bed was treated with curettage after the biopsy was performed.

## 2023-08-03 ENCOUNTER — APPOINTMENT (OUTPATIENT)
Dept: URBAN - METROPOLITAN AREA SURGERY 18 | Age: 78
Setting detail: DERMATOLOGY
End: 2023-08-06

## 2023-08-03 DIAGNOSIS — L21.8 OTHER SEBORRHEIC DERMATITIS: ICD-10-CM

## 2023-08-03 DIAGNOSIS — L57.0 ACTINIC KERATOSIS: ICD-10-CM

## 2023-08-03 DIAGNOSIS — Z71.89 OTHER SPECIFIED COUNSELING: ICD-10-CM

## 2023-08-03 DIAGNOSIS — Z85.828 PERSONAL HISTORY OF OTHER MALIGNANT NEOPLASM OF SKIN: ICD-10-CM

## 2023-08-03 DIAGNOSIS — L82.1 OTHER SEBORRHEIC KERATOSIS: ICD-10-CM

## 2023-08-03 PROCEDURE — OTHER PRESCRIPTION: OTHER

## 2023-08-03 PROCEDURE — 17000 DESTRUCT PREMALG LESION: CPT

## 2023-08-03 PROCEDURE — OTHER DIAGNOSIS COMMENT: OTHER

## 2023-08-03 PROCEDURE — 99214 OFFICE O/P EST MOD 30 MIN: CPT | Mod: 25

## 2023-08-03 PROCEDURE — 17003 DESTRUCT PREMALG LES 2-14: CPT

## 2023-08-03 PROCEDURE — OTHER COUNSELING: OTHER

## 2023-08-03 PROCEDURE — OTHER MIPS QUALITY: OTHER

## 2023-08-03 PROCEDURE — OTHER LIQUID NITROGEN: OTHER

## 2023-08-03 RX ORDER — CLOBETASOL PROPIONATE 0.5 MG/ML
SOLUTION TOPICAL
Qty: 50 | Refills: 0 | Status: ERX | COMMUNITY
Start: 2023-08-03

## 2023-08-03 ASSESSMENT — LOCATION ZONE DERM
LOCATION ZONE: LEG
LOCATION ZONE: TRUNK
LOCATION ZONE: SCALP
LOCATION ZONE: FACE

## 2023-08-03 ASSESSMENT — LOCATION SIMPLE DESCRIPTION DERM
LOCATION SIMPLE: UPPER BACK
LOCATION SIMPLE: SCALP
LOCATION SIMPLE: RIGHT TEMPLE
LOCATION SIMPLE: LEFT CALF
LOCATION SIMPLE: RIGHT FOREHEAD

## 2023-08-03 ASSESSMENT — LOCATION DETAILED DESCRIPTION DERM
LOCATION DETAILED: RIGHT LATERAL TEMPLE
LOCATION DETAILED: SUPERIOR THORACIC SPINE
LOCATION DETAILED: RIGHT CENTRAL FRONTAL SCALP
LOCATION DETAILED: RIGHT FOREHEAD
LOCATION DETAILED: LEFT PROXIMAL CALF

## 2023-08-03 NOTE — PROCEDURE: LIQUID NITROGEN
Detail Level: Detailed
Show Applicator Variable?: Yes
Consent: The patient's consent was obtained and the risks including but not limited to risks of crusting, scabbing, blistering, scarring, darker or lighter pigmentary change, recurrence, incomplete removal and infection were discussed prior to the procedure.
Render Note In Bullet Format When Appropriate: No
Duration Of Freeze Thaw-Cycle (Seconds): 0
Post-Care Instructions: I reviewed with the patient in detail post-care instructions. Patient is to wear sunprotection, and avoid picking at any of the treated lesions. Pt may apply Vaseline to crusted or scabbing areas.

## 2023-08-03 NOTE — PROCEDURE: MIPS QUALITY
Detail Level: Detailed
Quality 47: Advance Care Plan: Advance Care Planning discussed and documented; advance care plan or surrogate decision maker documented in the medical record.
Quality 431: Preventive Care And Screening: Unhealthy Alcohol Use - Screening: Patient not identified as an unhealthy alcohol user when screened for unhealthy alcohol use using a systematic screening method
Quality 226: Preventive Care And Screening: Tobacco Use: Screening And Cessation Intervention: Patient screened for tobacco use and is an ex/non-smoker
Quality 137: Melanoma: Continuity Of Care - Recall System: Patient information entered into a recall system that includes: target date for the next exam specified AND a process to follow up with patients regarding missed or unscheduled appointments
Quality 111:Pneumonia Vaccination Status For Older Adults: Patient did not receive any pneumococcal conjugate or polysaccharide vaccine on or after their 60th birthday and before the end of the measurement period
Quality 138: Melanoma: Coordination Of Care: A treatment plan was communicated to the physicians providing continuing care within one month of diagnosis outlining: diagnosis, tumor thickness and a plan for surgery or alternate care.

## 2023-09-06 ENCOUNTER — APPOINTMENT (OUTPATIENT)
Dept: URBAN - METROPOLITAN AREA SURGERY 18 | Age: 78
Setting detail: DERMATOLOGY
End: 2023-09-06

## 2023-09-06 VITALS — SYSTOLIC BLOOD PRESSURE: 118 MMHG | HEART RATE: 80 BPM | DIASTOLIC BLOOD PRESSURE: 65 MMHG

## 2023-09-06 PROBLEM — D04.4 CARCINOMA IN SITU OF SKIN OF SCALP AND NECK: Status: ACTIVE | Noted: 2023-09-06

## 2023-09-06 PROCEDURE — OTHER COUNSELING: OTHER

## 2023-09-06 PROCEDURE — 13132 CMPLX RPR F/C/C/M/N/AX/G/H/F: CPT

## 2023-09-06 PROCEDURE — 17311 MOHS 1 STAGE H/N/HF/G: CPT

## 2023-09-06 PROCEDURE — OTHER MIPS QUALITY: OTHER

## 2023-09-06 PROCEDURE — OTHER MOHS SURGERY: OTHER

## 2023-09-06 PROCEDURE — 17312 MOHS ADDL STAGE: CPT

## 2023-09-06 NOTE — PROCEDURE: MOHS SURGERY
Full Thickness Lip Wedge Repair (Flap) Text: Given the location of the defect and the proximity to free margins a full thickness wedge repair was deemed most appropriate.  Using a sterile surgical marker, the appropriate repair was drawn incorporating the defect and placing the expected incisions perpendicular to the vermilion border.  The vermilion border was also meticulously outlined to ensure appropriate reapproximation during the repair.  The area thus outlined was incised through and through with a #15 scalpel blade.  The muscularis and dermis were reaproximated with deep sutures following hemostasis. Care was taken to realign the vermilion border before proceeding with the superficial closure.  Once the vermilion was realigned the superfical and mucosal closure was finished. Bexarotene Counseling:  I discussed with the patient the risks of bexarotene including but not limited to hair loss, dry lips/skin/eyes, liver abnormalities, hyperlipidemia, pancreatitis, depression/suicidal ideation, photosensitivity, drug rash/allergic reactions, hypothyroidism, anemia, leukopenia, infection, cataracts, and teratogenicity.  Patient understands that they will need regular blood tests to check lipid profile, liver function tests, white blood cell count, thyroid function tests and pregnancy test if applicable.

## 2023-09-06 NOTE — PROCEDURE: MIPS QUALITY
Quality 111:Pneumonia Vaccination Status For Older Adults: Patient did not receive any pneumococcal conjugate or polysaccharide vaccine on or after their 60th birthday and before the end of the measurement period
Quality 226: Preventive Care And Screening: Tobacco Use: Screening And Cessation Intervention: Patient screened for tobacco use and is an ex/non-smoker
Detail Level: Detailed
Quality 47: Advance Care Plan: Advance Care Planning discussed and documented; advance care plan or surrogate decision maker documented in the medical record.
Quality 110: Preventive Care And Screening: Influenza Immunization: Influenza Immunization not Administered because Patient Refused.

## 2023-09-06 NOTE — PROCEDURE: MOHS SURGERY
Monitor. Same Histology In Subsequent Stages Text: The pattern and morphology of the tumor is as described in the first stage.

## 2023-09-06 NOTE — PROCEDURE: MOHS SURGERY
Mrs. Luna states she's having lower back pain- 6 on pain scale. States it's difficult to get out of bed, her  has to help her. She is only taking Tylenol 500mg three times a day as well as using the topical Lidocaine. She's currently going to PT as well as additional home stretching about 8-9 different stretches. Pain has worsen in the last four days. Patient wants to know if the stretches are making it worse or even helping at all. She would like to know is there anything else Dr. Dasilva would recommend.     She is scheduled to come in 04/29/19   Patient would like to discuss surgery options for her lower back   Posterior Auricular Interpolation Flap Text: A decision was made to reconstruct the defect utilizing an interpolation axial flap and a staged reconstruction.  A telfa template was made of the defect.  This telfa template was then used to outline the posterior auricular interpolation flap.  The donor area for the pedicle flap was then injected with anesthesia.  The flap was excised through the skin and subcutaneous tissue down to the layer of the underlying musculature.  The pedicle flap was carefully excised within this deep plane to maintain its blood supply.  The edges of the donor site were undermined.   The donor site was closed in a primary fashion.  The pedicle was then rotated into position and sutured.  Once the tube was sutured into place, adequate blood supply was confirmed with blanching and refill.  The pedicle was then wrapped with xeroform gauze and dressed appropriately with a telfa and gauze bandage to ensure continued blood supply and protect the attached pedicle.

## 2023-10-04 ENCOUNTER — APPOINTMENT (OUTPATIENT)
Dept: URBAN - METROPOLITAN AREA SURGERY 18 | Age: 78
Setting detail: DERMATOLOGY
End: 2023-10-08

## 2023-10-04 DIAGNOSIS — D485 NEOPLASM OF UNCERTAIN BEHAVIOR OF SKIN: ICD-10-CM

## 2023-10-04 DIAGNOSIS — Z85.820 PERSONAL HISTORY OF MALIGNANT MELANOMA OF SKIN: ICD-10-CM

## 2023-10-04 DIAGNOSIS — L82.1 OTHER SEBORRHEIC KERATOSIS: ICD-10-CM

## 2023-10-04 DIAGNOSIS — Z71.89 OTHER SPECIFIED COUNSELING: ICD-10-CM

## 2023-10-04 DIAGNOSIS — D18.0 HEMANGIOMA: ICD-10-CM

## 2023-10-04 DIAGNOSIS — Z85.828 PERSONAL HISTORY OF OTHER MALIGNANT NEOPLASM OF SKIN: ICD-10-CM

## 2023-10-04 DIAGNOSIS — Z86.006 PERSONAL HISTORY OF MELANOMA IN-SITU: ICD-10-CM

## 2023-10-04 DIAGNOSIS — L81.4 OTHER MELANIN HYPERPIGMENTATION: ICD-10-CM

## 2023-10-04 DIAGNOSIS — D22 MELANOCYTIC NEVI: ICD-10-CM

## 2023-10-04 DIAGNOSIS — L57.0 ACTINIC KERATOSIS: ICD-10-CM

## 2023-10-04 PROBLEM — D48.5 NEOPLASM OF UNCERTAIN BEHAVIOR OF SKIN: Status: ACTIVE | Noted: 2023-10-04

## 2023-10-04 PROBLEM — D18.01 HEMANGIOMA OF SKIN AND SUBCUTANEOUS TISSUE: Status: ACTIVE | Noted: 2023-10-04

## 2023-10-04 PROBLEM — D22.5 MELANOCYTIC NEVI OF TRUNK: Status: ACTIVE | Noted: 2023-10-04

## 2023-10-04 PROCEDURE — 11102 TANGNTL BX SKIN SINGLE LES: CPT | Mod: 59

## 2023-10-04 PROCEDURE — 99213 OFFICE O/P EST LOW 20 MIN: CPT | Mod: 25

## 2023-10-04 PROCEDURE — 17004 DESTROY PREMAL LESIONS 15/>: CPT

## 2023-10-04 PROCEDURE — OTHER MIPS QUALITY: OTHER

## 2023-10-04 PROCEDURE — OTHER BIOPSY BY SHAVE METHOD: OTHER

## 2023-10-04 PROCEDURE — OTHER SUNSCREEN RECOMMENDATIONS: OTHER

## 2023-10-04 PROCEDURE — OTHER COUNSELING: OTHER

## 2023-10-04 PROCEDURE — 11103 TANGNTL BX SKIN EA SEP/ADDL: CPT

## 2023-10-04 PROCEDURE — OTHER LIQUID NITROGEN: OTHER

## 2023-10-04 ASSESSMENT — LOCATION DETAILED DESCRIPTION DERM
LOCATION DETAILED: LEFT ULNAR DORSAL HAND
LOCATION DETAILED: RIGHT DISTAL PRETIBIAL REGION
LOCATION DETAILED: RIGHT UPPER CUTANEOUS LIP
LOCATION DETAILED: RIGHT PROXIMAL DORSAL FOREARM
LOCATION DETAILED: LEFT ANTERIOR DISTAL UPPER ARM
LOCATION DETAILED: LEFT SUPERIOR UPPER BACK
LOCATION DETAILED: RIGHT DISTAL POSTERIOR UPPER ARM
LOCATION DETAILED: LEFT POPLITEAL SKIN
LOCATION DETAILED: RIGHT MEDIAL FOREHEAD
LOCATION DETAILED: RIGHT LATERAL MALAR CHEEK
LOCATION DETAILED: LEFT ANTERIOR PROXIMAL THIGH
LOCATION DETAILED: LEFT INFERIOR MEDIAL FOREHEAD
LOCATION DETAILED: LEFT MID-UPPER BACK
LOCATION DETAILED: RIGHT ELBOW
LOCATION DETAILED: RIGHT DISTAL POSTERIOR THIGH
LOCATION DETAILED: NASAL DORSUM
LOCATION DETAILED: LEFT INFERIOR FOREHEAD
LOCATION DETAILED: RIGHT SUPERIOR FOREHEAD
LOCATION DETAILED: LEFT INFERIOR POSTAURICULAR SKIN
LOCATION DETAILED: LEFT PROXIMAL PRETIBIAL REGION
LOCATION DETAILED: RIGHT ANTERIOR DISTAL THIGH
LOCATION DETAILED: RIGHT INFERIOR LATERAL FOREHEAD
LOCATION DETAILED: RIGHT LATERAL SUPERIOR CHEST
LOCATION DETAILED: LEFT CENTRAL TEMPLE
LOCATION DETAILED: RIGHT PROXIMAL PRETIBIAL REGION
LOCATION DETAILED: LEFT SUPERIOR MEDIAL UPPER BACK
LOCATION DETAILED: LEFT MEDIAL SUPERIOR CHEST
LOCATION DETAILED: RIGHT ANTERIOR DISTAL UPPER ARM
LOCATION DETAILED: LEFT VENTRAL PROXIMAL FOREARM
LOCATION DETAILED: LEFT PROXIMAL DORSAL FOREARM
LOCATION DETAILED: RIGHT PROXIMAL DORSAL MIDDLE FINGER
LOCATION DETAILED: LEFT MEDIAL UPPER BACK
LOCATION DETAILED: LEFT DISTAL DORSAL FOREARM
LOCATION DETAILED: LEFT DISTAL POSTERIOR UPPER ARM
LOCATION DETAILED: RIGHT VENTRAL PROXIMAL FOREARM

## 2023-10-04 ASSESSMENT — LOCATION ZONE DERM
LOCATION ZONE: FACE
LOCATION ZONE: HAND
LOCATION ZONE: ARM
LOCATION ZONE: NOSE
LOCATION ZONE: FINGER
LOCATION ZONE: LEG
LOCATION ZONE: TRUNK
LOCATION ZONE: SCALP
LOCATION ZONE: LIP

## 2023-10-04 ASSESSMENT — LOCATION SIMPLE DESCRIPTION DERM
LOCATION SIMPLE: LEFT POSTERIOR UPPER ARM
LOCATION SIMPLE: RIGHT UPPER ARM
LOCATION SIMPLE: LEFT FOREARM
LOCATION SIMPLE: RIGHT CHEEK
LOCATION SIMPLE: LEFT FOREHEAD
LOCATION SIMPLE: RIGHT POSTERIOR UPPER ARM
LOCATION SIMPLE: LEFT UPPER BACK
LOCATION SIMPLE: CHEST
LOCATION SIMPLE: RIGHT MIDDLE FINGER
LOCATION SIMPLE: RIGHT FOREARM
LOCATION SIMPLE: RIGHT THIGH
LOCATION SIMPLE: LEFT POPLITEAL SKIN
LOCATION SIMPLE: RIGHT PRETIBIAL REGION
LOCATION SIMPLE: RIGHT POSTERIOR THIGH
LOCATION SIMPLE: RIGHT FOREHEAD
LOCATION SIMPLE: LEFT HAND
LOCATION SIMPLE: LEFT THIGH
LOCATION SIMPLE: RIGHT ELBOW
LOCATION SIMPLE: LEFT TEMPLE
LOCATION SIMPLE: SCALP
LOCATION SIMPLE: RIGHT LIP
LOCATION SIMPLE: LEFT UPPER ARM
LOCATION SIMPLE: NOSE
LOCATION SIMPLE: LEFT PRETIBIAL REGION

## 2023-10-04 NOTE — HPI: MELANOMA F/U (HISTORY OF MALIGNANT MELANOMA)
What Is The Reason For Today's Visit?: History of Melanoma
Additional History: Her malignant melanoma type, excised in 2016 on R forearm, unknown depth, denies SLN bx. H/o MIS on R pretibial leg, excised in 11/2022. Histologic Details: no SLN bx performed. Pertinent negatives include: no new headaches, no new lumps or bumps, no new itching, changing, or symptomatic moles or skin lesions, no new palpable or enlarging lymph nodes, no previous history of melanoma, no seizures, no unintentional weight loss, and no vision changes. This melanoma has been treated with: surgical excision and Mohs. She presents today for: skin cancer surveillance. She is up to date on gynecologic, dental and ocular exams.

## 2023-10-04 NOTE — PROCEDURE: BIOPSY BY SHAVE METHOD
Nurse's notes reviewed and accepted.    SUBJECTIVE: He presents with complaints of undocumented fever, chills, sinus pressure, dry cough, fatigue, sweats and body aches. He has been feeling ill for 2 days. He denies hemoptysis, ear pain, SOB, loss of taste and loss of smell.      OBJECTIVE: In general he is in no acute distress. On today's exam the tympanic membranes are normal, landmarks visualized bilaterally. The external auditory canals are normal. There is mild bilateral maxillary sinus pressure tenderness. No frontal sinus pressure tenderness. Nares are patent. Pharynx is pink and moist. The neck is supple. There is no adenopathy in the anterior cervical chain or supraclavicular region. Lungs are clear to auscultation and percussion. Heart has a regular rate and rhythm, no murmur, rub or S3.    Covid antigen positive.  Covid PCR pending.    IMPRESSION: 1) Covid infection.    PLAN: 1) Observe CDC isolation guidelines. RTC at once if worse in any way. Go to ER for SOB. See PMD 2 weeks.    PWG wore gown, gloves, N95 mask and face shield the entire encounter.       Hemostasis: Drysol

## 2023-10-04 NOTE — PROCEDURE: MIPS QUALITY
Quality 137: Melanoma: Continuity Of Care - Recall System: Patient information entered into a recall system that includes: target date for the next exam specified AND a process to follow up with patients regarding missed or unscheduled appointments
Quality 138: Melanoma: Coordination Of Care: A treatment plan was communicated to the physicians providing continuing care within one month of diagnosis outlining: diagnosis, tumor thickness and a plan for surgery or alternate care.
Quality 226: Preventive Care And Screening: Tobacco Use: Screening And Cessation Intervention: Patient screened for tobacco use and is an ex/non-smoker
Detail Level: Detailed
Quality 431: Preventive Care And Screening: Unhealthy Alcohol Use - Screening: Patient not identified as an unhealthy alcohol user when screened for unhealthy alcohol use using a systematic screening method
Quality 402: Tobacco Use And Help With Quitting Among Adolescents: Patient screened for tobacco and is an ex-smoker

## 2023-10-04 NOTE — PROCEDURE: LIQUID NITROGEN
Detail Level: Detailed
Render Post-Care Instructions In Note?: no
Duration Of Freeze Thaw-Cycle (Seconds): 0
Post-Care Instructions: I reviewed with the patient in detail post-care instructions. Patient is to wear sunprotection, and avoid picking at any of the treated lesions. Pt may apply Vaseline to crusted or scabbing areas.
Consent: The patient's consent was obtained and the risks including but not limited to risks of crusting, scabbing, blistering, scarring, darker or lighter pigmentary change, recurrence, incomplete removal and infection were discussed prior to the procedure.
Show Applicator Variable?: Yes

## 2023-12-12 NOTE — PROCEDURE: MIPS QUALITY
What Type Of Note Output Would You Prefer (Optional)?: Standard Output
How Severe Are Your Spot(S)?: mild
Hpi Title: Evaluation of Skin Lesions
Quality 111:Pneumonia Vaccination Status For Older Adults: Pneumococcal vaccine (PPSV23) was not administered on or after patient’s 60th birthday and before the end of the measurement period, reason not otherwise specified
Detail Level: Detailed
Quality 110: Preventive Care And Screening: Influenza Immunization: Influenza Immunization not Administered because Patient Refused.

## 2023-12-27 NOTE — PROCEDURE: TREATMENT REGIMEN
[facility] [place]                     [address], [address]                                OPERATIVE REPORT    PATIENT NAME: Santos Pearl                    :        1959  MED REC NO:   [id number]                          ROOM:  ACCOUNT NO:   [de-identified]                           ADMIT DATE: 2023  PROVIDER:     Pratibha Gupta MD    DATE OF PROCEDURE:  2023    PREOPERATIVE DIAGNOSES:  1. Breast cancer. 2.  Need for IV access for chemotherapy. POSTOPERATIVE DIAGNOSES:  1. Breast cancer. 2.  Need for IV access for chemotherapy. OPERATION PERFORMED:  1. Placement single-lumen 8-[place] venous PowerPort. 2.  Fluoro use for port placement. SURGEON:  Pratibha Gupta MD    ANESTHESIA:  General plus local.    ESTIMATED BLOOD LOSS:  Minimal.    COMPLICATIONS:  None. ADDITIONAL DETAILS OF THE SURGERY:  The patient was brought to the  operating room and placed on the operative table in the supine position. The chest and neck areas were all prepped and draped sterilely and a  time-out was done. Local anesthetic was placed. IV sedation was given. A skin incision was made on the left side in the clavipectoral groove  region. Dissection carried down to the level of the cephalic vein. The  cephalic vein was dissected and controlled proximally and distally. A  transverse venotomy was made. The angled Glidewire was passed through  the venotomy into the central circulation, positioned in the superior  vena cava right at the atrial junction region and then the Port-A-Cath  tubing was slid over the Glidewire and the Glidewire was removed. The  catheter was aspirated and irrigated and had excellent flow with easy  aspiration. It was positioned at the superior vena cava at the right  atrial junction region. The ties were secured on the vein  hemostatically.   A port pocket was made and then the port tubing and  tube sleeve
Initiate Treatment: Efudex cream BID to the AA on the forehead (avoid biopsy site), temples, nose & chest x 2 weeks. Pt has rx at home and declines a refill.  Avoid sun while using.
Detail Level: Zone
Plan: F/u in 2 weeks for Efudex check.

## 2024-01-03 ENCOUNTER — APPOINTMENT (OUTPATIENT)
Dept: URBAN - METROPOLITAN AREA SURGERY 18 | Age: 79
Setting detail: DERMATOLOGY
End: 2024-01-03

## 2024-01-03 DIAGNOSIS — Z85.828 PERSONAL HISTORY OF OTHER MALIGNANT NEOPLASM OF SKIN: ICD-10-CM

## 2024-01-03 DIAGNOSIS — L81.4 OTHER MELANIN HYPERPIGMENTATION: ICD-10-CM

## 2024-01-03 DIAGNOSIS — D485 NEOPLASM OF UNCERTAIN BEHAVIOR OF SKIN: ICD-10-CM

## 2024-01-03 DIAGNOSIS — D22 MELANOCYTIC NEVI: ICD-10-CM

## 2024-01-03 DIAGNOSIS — L82.1 OTHER SEBORRHEIC KERATOSIS: ICD-10-CM

## 2024-01-03 DIAGNOSIS — L57.0 ACTINIC KERATOSIS: ICD-10-CM

## 2024-01-03 DIAGNOSIS — Z86.006 PERSONAL HISTORY OF MELANOMA IN-SITU: ICD-10-CM

## 2024-01-03 DIAGNOSIS — Z85.820 PERSONAL HISTORY OF MALIGNANT MELANOMA OF SKIN: ICD-10-CM

## 2024-01-03 DIAGNOSIS — D18.0 HEMANGIOMA: ICD-10-CM

## 2024-01-03 DIAGNOSIS — Z71.89 OTHER SPECIFIED COUNSELING: ICD-10-CM

## 2024-01-03 PROBLEM — D18.01 HEMANGIOMA OF SKIN AND SUBCUTANEOUS TISSUE: Status: ACTIVE | Noted: 2024-01-03

## 2024-01-03 PROBLEM — D48.5 NEOPLASM OF UNCERTAIN BEHAVIOR OF SKIN: Status: ACTIVE | Noted: 2024-01-03

## 2024-01-03 PROBLEM — D22.5 MELANOCYTIC NEVI OF TRUNK: Status: ACTIVE | Noted: 2024-01-03

## 2024-01-03 PROCEDURE — OTHER DIAGNOSIS COMMENT: OTHER

## 2024-01-03 PROCEDURE — OTHER BIOPSY BY SHAVE METHOD: OTHER

## 2024-01-03 PROCEDURE — OTHER COUNSELING: OTHER

## 2024-01-03 PROCEDURE — OTHER MIPS QUALITY: OTHER

## 2024-01-03 PROCEDURE — OTHER LIQUID NITROGEN: OTHER

## 2024-01-03 PROCEDURE — OTHER PRESCRIPTION: OTHER

## 2024-01-03 PROCEDURE — 99214 OFFICE O/P EST MOD 30 MIN: CPT | Mod: 25

## 2024-01-03 PROCEDURE — 17004 DESTROY PREMAL LESIONS 15/>: CPT

## 2024-01-03 PROCEDURE — OTHER MEDICATION COUNSELING: OTHER

## 2024-01-03 PROCEDURE — 11102 TANGNTL BX SKIN SINGLE LES: CPT | Mod: 59

## 2024-01-03 PROCEDURE — 11103 TANGNTL BX SKIN EA SEP/ADDL: CPT

## 2024-01-03 PROCEDURE — OTHER SUNSCREEN RECOMMENDATIONS: OTHER

## 2024-01-03 RX ORDER — HYDROCORTISONE 25 MG/G
CREAM TOPICAL
Qty: 20 | Refills: 0 | Status: ERX | COMMUNITY
Start: 2024-01-03

## 2024-01-03 ASSESSMENT — LOCATION DETAILED DESCRIPTION DERM
LOCATION DETAILED: LEFT SUPERIOR FOREHEAD
LOCATION DETAILED: RIGHT VENTRAL PROXIMAL FOREARM
LOCATION DETAILED: RIGHT RADIAL DORSAL HAND
LOCATION DETAILED: RIGHT LATERAL SUPERIOR CHEST
LOCATION DETAILED: LEFT SUPERIOR MEDIAL UPPER BACK
LOCATION DETAILED: LEFT DISTAL POSTERIOR UPPER ARM
LOCATION DETAILED: RIGHT SUPERIOR MEDIAL UPPER BACK
LOCATION DETAILED: LEFT ANTERIOR DISTAL UPPER ARM
LOCATION DETAILED: RIGHT PROXIMAL DORSAL FOREARM
LOCATION DETAILED: LEFT SUPERIOR FRONTAL SCALP
LOCATION DETAILED: RIGHT ANTERIOR LATERAL PROXIMAL UPPER ARM
LOCATION DETAILED: RIGHT LATERAL FOREHEAD
LOCATION DETAILED: RIGHT DISTAL PRETIBIAL REGION
LOCATION DETAILED: LEFT FOREHEAD
LOCATION DETAILED: LEFT MID-UPPER BACK
LOCATION DETAILED: LEFT POPLITEAL SKIN
LOCATION DETAILED: RIGHT LATERAL TEMPLE
LOCATION DETAILED: RIGHT PROXIMAL PRETIBIAL REGION
LOCATION DETAILED: LEFT RADIAL DORSAL HAND
LOCATION DETAILED: LEFT INFERIOR MEDIAL FOREHEAD
LOCATION DETAILED: RIGHT FOREHEAD
LOCATION DETAILED: LEFT VENTRAL PROXIMAL FOREARM
LOCATION DETAILED: LEFT LATERAL FOREHEAD
LOCATION DETAILED: LEFT MEDIAL SUPERIOR CHEST
LOCATION DETAILED: RIGHT UPPER CUTANEOUS LIP
LOCATION DETAILED: RIGHT ANTERIOR DISTAL UPPER ARM
LOCATION DETAILED: UPPER STERNUM
LOCATION DETAILED: RIGHT DISTAL POSTERIOR THIGH
LOCATION DETAILED: LEFT DISTAL PRETIBIAL REGION
LOCATION DETAILED: LEFT CENTRAL TEMPLE
LOCATION DETAILED: RIGHT DISTAL POSTERIOR UPPER ARM
LOCATION DETAILED: RIGHT MEDIAL UPPER BACK
LOCATION DETAILED: LEFT SUPERIOR UPPER BACK
LOCATION DETAILED: LEFT MEDIAL UPPER BACK
LOCATION DETAILED: LEFT PROXIMAL PRETIBIAL REGION
LOCATION DETAILED: RIGHT ANTERIOR DISTAL THIGH
LOCATION DETAILED: LEFT ANTERIOR PROXIMAL THIGH

## 2024-01-03 ASSESSMENT — LOCATION SIMPLE DESCRIPTION DERM
LOCATION SIMPLE: RIGHT THIGH
LOCATION SIMPLE: LEFT UPPER BACK
LOCATION SIMPLE: RIGHT TEMPLE
LOCATION SIMPLE: LEFT POSTERIOR UPPER ARM
LOCATION SIMPLE: RIGHT PRETIBIAL REGION
LOCATION SIMPLE: RIGHT POSTERIOR UPPER ARM
LOCATION SIMPLE: LEFT FOREARM
LOCATION SIMPLE: RIGHT UPPER ARM
LOCATION SIMPLE: LEFT PRETIBIAL REGION
LOCATION SIMPLE: LEFT TEMPLE
LOCATION SIMPLE: CHEST
LOCATION SIMPLE: SCALP
LOCATION SIMPLE: RIGHT HAND
LOCATION SIMPLE: RIGHT FOREARM
LOCATION SIMPLE: LEFT HAND
LOCATION SIMPLE: LEFT FOREHEAD
LOCATION SIMPLE: LEFT POPLITEAL SKIN
LOCATION SIMPLE: LEFT UPPER ARM
LOCATION SIMPLE: RIGHT POSTERIOR THIGH
LOCATION SIMPLE: RIGHT UPPER BACK
LOCATION SIMPLE: RIGHT LIP
LOCATION SIMPLE: RIGHT FOREHEAD
LOCATION SIMPLE: LEFT THIGH

## 2024-01-03 ASSESSMENT — LOCATION ZONE DERM
LOCATION ZONE: SCALP
LOCATION ZONE: HAND
LOCATION ZONE: ARM
LOCATION ZONE: LIP
LOCATION ZONE: FACE
LOCATION ZONE: TRUNK
LOCATION ZONE: LEG

## 2024-01-03 NOTE — PROCEDURE: LIQUID NITROGEN
Duration Of Freeze Thaw-Cycle (Seconds): 0
Render Post-Care Instructions In Note?: no
Consent: The patient's consent was obtained and the risks including but not limited to risks of crusting, scabbing, blistering, scarring, darker or lighter pigmentary change, recurrence, incomplete removal and infection were discussed prior to the procedure.
Show Aperture Variable?: Yes
Detail Level: Detailed
Post-Care Instructions: I reviewed with the patient in detail post-care instructions. Patient is to wear sunprotection, and avoid picking at any of the treated lesions. Pt may apply Vaseline to crusted or scabbing areas.

## 2024-01-03 NOTE — HPI: MELANOMA F/U (HISTORY OF MALIGNANT MELANOMA)
What Is The Reason For Today's Visit?: History of Melanoma
Year Excised?: 2016 on the R upper man, unknown depth, denies SLN biopsy. MIS R pretibial leg excised 11/2022. No SLN biopsy performed.

## 2024-01-03 NOTE — PROCEDURE: DIAGNOSIS COMMENT
Detail Level: Simple
Render Risk Assessment In Note?: no
Comment: Unclear etiology, it is excoriated.  AK vs SCCIS vs BCC vs trauma vs other.  I recommended a biopsy for definitive diagnosis but she declined.  Risks/benefits/outcomes of biopsy reviewed.  All potential outcomes associated with declining a biopsy reviewed.  All questions answered and she verbalized understanding.   She requests treatment with topical steroid BID for 2 weeks in order to see if it resolves.  Avoid manipulation.  If it does not resolve then she should return for evaluation.  All questions answered and she verbalized understanding.

## 2024-01-03 NOTE — PROCEDURE: BIOPSY BY SHAVE METHOD

## 2024-01-03 NOTE — PROCEDURE: MEDICATION COUNSELING
Detail Level: Simple
Terbinafine Pregnancy And Lactation Text: This medication is Pregnancy Category B and is considered safe during pregnancy. It is also excreted in breast milk and breast feeding isn't recommended.
Humira Pregnancy And Lactation Text: This medication is Pregnancy Category B and is considered safe during pregnancy. It is unknown if this medication is excreted in breast milk.
Xolair Counseling:  Patient informed of potential adverse effects including but not limited to fever, muscle aches, rash and allergic reactions.  The patient verbalized understanding of the proper use and possible adverse effects of Xolair.  All of the patient's questions and concerns were addressed.
Propranolol Pregnancy And Lactation Text: This medication is Pregnancy Category C and it isn't known if it is safe during pregnancy. It is excreted in breast milk.
Cosentyx Counseling:  I discussed with the patient the risks of Cosentyx including but not limited to worsening of Crohn's disease, immunosuppression, allergic reactions and infections.  The patient understands that monitoring is required including a PPD at baseline and must alert us or the primary physician if symptoms of infection or other concerning signs are noted.
Arava Pregnancy And Lactation Text: This medication is Pregnancy Category X and is absolutely contraindicated during pregnancy. It is unknown if it is excreted in breast milk.
Albendazole Pregnancy And Lactation Text: This medication is Pregnancy Category C and it isn't known if it is safe during pregnancy. It is also excreted in breast milk.
Cephalexin Pregnancy And Lactation Text: This medication is Pregnancy Category B and considered safe during pregnancy.  It is also excreted in breast milk but can be used safely for shorter doses.
Olanzapine Counseling- I discussed with the patient the common side effects of olanzapine including but are not limited to: lack of energy, dry mouth, increased appetite, sleepiness, tremor, constipation, dizziness, changes in behavior, or restlessness.  Explained that teenagers are more likely to experience headaches, abdominal pain, pain in the arms or legs, tiredness, and sleepiness.  Serious side effects include but are not limited: increased risk of death in elderly patients who are confused, have memory loss, or dementia-related psychosis; hyperglycemia; increased cholesterol and triglycerides; and weight gain.
Topical Sulfur Applications Counseling: Topical Sulfur Counseling: Patient counseled that this medication may cause skin irritation or allergic reactions.  In the event of skin irritation, the patient was advised to reduce the amount of the drug applied or use it less frequently.   The patient verbalized understanding of the proper use and possible adverse effects of topical sulfur application.  All of the patient's questions and concerns were addressed.
Qbrexza Pregnancy And Lactation Text: There is no available data on Qbrexza use in pregnant women.  There is no available data on Qbrexza use in lactation.
Carac Pregnancy And Lactation Text: This medication is Pregnancy Category X and contraindicated in pregnancy and in women who may become pregnant. It is unknown if this medication is excreted in breast milk.
Azathioprine Pregnancy And Lactation Text: This medication is Pregnancy Category D and isn't considered safe during pregnancy. It is unknown if this medication is excreted in breast milk.
High Dose Vitamin A Pregnancy And Lactation Text: High dose vitamin A therapy is contraindicated during pregnancy and breast feeding.
Sotyktu Counseling:  I discussed the most common side effects of Sotyktu including: common cold, sore throat, sinus infections, cold sores, canker sores, folliculitis, and acne.  I also discussed more serious side effects of Sotyktu including but not limited to: serious allergic reactions; increased risk for infections such as TB; cancers such as lymphomas; rhabdomyolysis and elevated CPK; and elevated triglycerides and liver enzymes. 
Mirvaso Counseling: Mirvaso is a topical medication which can decrease superficial blood flow where applied. Side effects are uncommon and include stinging, redness and allergic reactions.
Glycopyrrolate Pregnancy And Lactation Text: This medication is Pregnancy Category B and is considered safe during pregnancy. It is unknown if it is excreted breast milk.
Fluconazole Counseling:  Patient counseled regarding adverse effects of fluconazole including but not limited to headache, diarrhea, nausea, upset stomach, liver function test abnormalities, taste disturbance, and stomach pain.  There is a rare possibility of liver failure that can occur when taking fluconazole.  The patient understands that monitoring of LFTs and kidney function test may be required, especially at baseline. The patient verbalized understanding of the proper use and possible adverse effects of fluconazole.  All of the patient's questions and concerns were addressed.
Minocycline Pregnancy And Lactation Text: This medication is Pregnancy Category D and not consider safe during pregnancy. It is also excreted in breast milk.
Ivermectin Counseling:  Patient instructed to take medication on an empty stomach with a full glass of water.  Patient informed of potential adverse effects including but not limited to nausea, diarrhea, dizziness, itching, and swelling of the extremities or lymph nodes.  The patient verbalized understanding of the proper use and possible adverse effects of ivermectin.  All of the patient's questions and concerns were addressed.
Clindamycin Counseling: I counseled the patient regarding use of clindamycin as an antibiotic for prophylactic and/or therapeutic purposes. Clindamycin is active against numerous classes of bacteria, including skin bacteria. Side effects may include nausea, diarrhea, gastrointestinal upset, rash, hives, yeast infections, and in rare cases, colitis.
Eucrisa Pregnancy And Lactation Text: This medication has not been assigned a Pregnancy Risk Category but animal studies failed to show danger with the topical medication. It is unknown if the medication is excreted in breast milk.
Simponi Pregnancy And Lactation Text: The risk during pregnancy and breastfeeding is uncertain with this medication.
Prednisone Pregnancy And Lactation Text: This medication is Pregnancy Category C and it isn't know if it is safe during pregnancy. This medication is excreted in breast milk.
Vtama Pregnancy And Lactation Text: It is unknown if this medication can cause problems during pregnancy and breastfeeding.
Tazorac Pregnancy And Lactation Text: This medication is not safe during pregnancy. It is unknown if this medication is excreted in breast milk.
Include Pregnancy/Lactation Warning?: No
Calcipotriene Counseling:  I discussed with the patient the risks of calcipotriene including but not limited to erythema, scaling, itching, and irritation.
Rhofade Counseling: Rhofade is a topical medication which can decrease superficial blood flow where applied. Side effects are uncommon and include stinging, redness and allergic reactions.
Ilumya Counseling: I discussed with the patient the risks of tildrakizumab including but not limited to immunosuppression, malignancy, posterior leukoencephalopathy syndrome, and serious infections.  The patient understands that monitoring is required including a PPD at baseline and must alert us or the primary physician if symptoms of infection or other concerning signs are noted.
Olanzapine Pregnancy And Lactation Text: This medication is pregnancy category C.   There are no adequate and well controlled trials with olanzapine in pregnant females.  Olanzapine should be used during pregnancy only if the potential benefit justifies the potential risk to the fetus.   In a study in lactating healthy women, olanzapine was excreted in breast milk.  It is recommended that women taking olanzapine should not breast feed.
Dutasteride Pregnancy And Lactation Text: This medication is absolutely contraindicated in women, especially during pregnancy and breast feeding. Feminization of male fetuses is possible if taking while pregnant.
Mirvaso Pregnancy And Lactation Text: This medication has not been assigned a Pregnancy Risk Category. It is unknown if the medication is excreted in breast milk.
Topical Sulfur Applications Pregnancy And Lactation Text: This medication is considered safe during pregnancy and breast feeding secondary to limited systemic absorption.
Fluconazole Pregnancy And Lactation Text: This medication is Pregnancy Category C and it isn't know if it is safe during pregnancy. It is also excreted in breast milk.
Cellcept Counseling:  I discussed with the patient the risks of mycophenolate mofetil including but not limited to infection/immunosuppression, GI upset, hypokalemia, hypercholesterolemia, bone marrow suppression, lymphoproliferative disorders, malignancy, GI ulceration/bleed/perforation, colitis, interstitial lung disease, kidney failure, progressive multifocal leukoencephalopathy, and birth defects.  The patient understands that monitoring is required including a baseline creatinine and regular CBC testing. In addition, patient must alert us immediately if symptoms of infection or other concerning signs are noted.
SSKI Counseling:  I discussed with the patient the risks of SSKI including but not limited to thyroid abnormalities, metallic taste, GI upset, fever, headache, acne, arthralgias, paraesthesias, lymphadenopathy, easy bleeding, arrhythmias, and allergic reaction.
Xolair Pregnancy And Lactation Text: This medication is Pregnancy Category B and is considered safe during pregnancy. This medication is excreted in breast milk.
Quinolones Counseling:  I discussed with the patient the risks of fluoroquinolones including but not limited to GI upset, allergic reaction, drug rash, diarrhea, dizziness, photosensitivity, yeast infections, liver function test abnormalities, tendonitis/tendon rupture.
Clofazimine Counseling:  I discussed with the patient the risks of clofazimine including but not limited to skin and eye pigmentation, liver damage, nausea/vomiting, gastrointestinal bleeding and allergy.
Clindamycin Pregnancy And Lactation Text: This medication can be used in pregnancy if certain situations. Clindamycin is also present in breast milk.
Finasteride Male Counseling: Finasteride Counseling:  I discussed with the patient the risks of use of finasteride including but not limited to decreased libido, decreased ejaculate volume, gynecomastia, and depression. Women should not handle medication.  All of the patient's questions and concerns were addressed.
Cimetidine Counseling:  I discussed with the patient the risks of Cimetidine including but not limited to gynecomastia, headache, diarrhea, nausea, drowsiness, arrhythmias, pancreatitis, skin rashes, psychosis, bone marrow suppression and kidney toxicity.
Calcipotriene Pregnancy And Lactation Text: The use of this medication during pregnancy or lactation is not recommended as there is insufficient data.
Topical Clindamycin Counseling: Patient counseled that this medication may cause skin irritation or allergic reactions.  In the event of skin irritation, the patient was advised to reduce the amount of the drug applied or use it less frequently.   The patient verbalized understanding of the proper use and possible adverse effects of clindamycin.  All of the patient's questions and concerns were addressed.
Cibinqo Counseling: I discussed with the patient the risks of Cibinqo therapy including but not limited to common cold, nausea, headache, cold sores, increased blood CPK levels, dizziness, UTIs, fatigue, acne, and vomitting. Live vaccines should be avoided.  This medication has been linked to serious infections; higher rate of mortality; malignancy and lymphoproliferative disorders; major adverse cardiovascular events; thrombosis; thrombocytopenia and lymphopenia; lipid elevations; and retinal detachment.
Sski Pregnancy And Lactation Text: This medication is Pregnancy Category D and isn't considered safe during pregnancy. It is excreted in breast milk.
Skyrizi Counseling: I discussed with the patient the risks of risankizumab-rzaa including but not limited to immunosuppression, and serious infections.  The patient understands that monitoring is required including a PPD at baseline and must alert us or the primary physician if symptoms of infection or other concerning signs are noted.
Zoryve Counseling:  I discussed with the patient that Zoryve is not for use in the eyes, mouth or vagina. The most commonly reported side effects include diarrhea, headache, insomnia, application site pain, upper respiratory tract infections, and urinary tract infections.  All of the patient's questions and concerns were addressed.
Sotyktu Pregnancy And Lactation Text: There is insufficient data to evaluate whether or not Sotyktu is safe to use during pregnancy.   It is not known if Sotyktu passes into breast milk and whether or not it is safe to use when breastfeeding.  
Hydroxychloroquine Counseling:  I discussed with the patient that a baseline ophthalmologic exam is needed at the start of therapy and every year thereafter while on therapy. A CBC may also be warranted for monitoring.  The side effects of this medication were discussed with the patient, including but not limited to agranulocytosis, aplastic anemia, seizures, rashes, retinopathy, and liver toxicity. Patient instructed to call the office should any adverse effect occur.  The patient verbalized understanding of the proper use and possible adverse effects of Plaquenil.  All the patient's questions and concerns were addressed.
Hydroquinone Counseling:  Patient advised that medication may result in skin irritation, lightening (hypopigmentation), dryness, and burning.  In the event of skin irritation, the patient was advised to reduce the amount of the drug applied or use it less frequently.  Rarely, spots that are treated with hydroquinone can become darker (pseudoochronosis).  Should this occur, patient instructed to stop medication and call the office. The patient verbalized understanding of the proper use and possible adverse effects of hydroquinone.  All of the patient's questions and concerns were addressed.
Dupixent Counseling: I discussed with the patient the risks of dupilumab including but not limited to eye infection and irritation, cold sores, injection site reactions, worsening of asthma, allergic reactions and increased risk of parasitic infection.  Live vaccines should be avoided while taking dupilumab. Dupilumab will also interact with certain medications such as warfarin and cyclosporine. The patient understands that monitoring is required and they must alert us or the primary physician if symptoms of infection or other concerning signs are noted.
Oral Minoxidil Counseling- I discussed with the patient the risks of oral minoxidil including but not limited to shortness of breath, swelling of the feet or ankles, dizziness, lightheadedness, unwanted hair growth and allergic reaction.  The patient verbalized understanding of the proper use and possible adverse effects of oral minoxidil.  All of the patient's questions and concerns were addressed.
Cantharidin Counseling:  I discussed with the patient the risks of Cantharidin including but not limited to pain, redness, burning, itching, and blistering.
Xeljanz Counseling: I discussed with the patient the risks of Xeljanz therapy including increased risk of infection, liver issues, headache, diarrhea, or cold symptoms. Live vaccines should be avoided. They were instructed to call if they have any problems.
Hydroxychloroquine Pregnancy And Lactation Text: This medication has been shown to cause fetal harm but it isn't assigned a Pregnancy Risk Category. There are small amounts excreted in breast milk.
Clofazimine Pregnancy And Lactation Text: This medication is Pregnancy Category C and isn't considered safe during pregnancy. It is excreted in breast milk.
Griseofulvin Counseling:  I discussed with the patient the risks of griseofulvin including but not limited to photosensitivity, cytopenia, liver damage, nausea/vomiting and severe allergy.  The patient understands that this medication is best absorbed when taken with a fatty meal (e.g., ice cream or french fries).
Topical Clindamycin Pregnancy And Lactation Text: This medication is Pregnancy Category B and is considered safe during pregnancy. It is unknown if it is excreted in breast milk.
Cantharidin Pregnancy And Lactation Text: This medication has not been proven safe during pregnancy. It is unknown if this medication is excreted in breast milk.
Opzelura Counseling:  I discussed with the patient the risks of Opzelura including but not limited to nasopharngitis, bronchitis, ear infection, eosinophila, hives, diarrhea, folliculitis, tonsillitis, and rhinorrhea.  Taken orally, this medication has been linked to serious infections; higher rate of mortality; malignancy and lymphoproliferative disorders; major adverse cardiovascular events; thrombosis; thrombocytopenia, anemia, and neutropenia; and lipid elevations.
Aklief counseling:  Patient advised to apply a pea-sized amount only at bedtime and wait 30 minutes after washing their face before applying.  If too drying, patient may add a non-comedogenic moisturizer.  The most commonly reported side effects including irritation, redness, scaling, dryness, stinging, burning, itching, and increased risk of sunburn.  The patient verbalized understanding of the proper use and possible adverse effects of retinoids.  All of the patient's questions and concerns were addressed.
Cibinqo Pregnancy And Lactation Text: It is unknown if this medication will adversely affect pregnancy or breast feeding.  You should not take this medication if you are currently pregnant or planning a pregnancy or while breastfeeding.
Thalidomide Counseling: I discussed with the patient the risks of thalidomide including but not limited to birth defects, anxiety, weakness, chest pain, dizziness, cough and severe allergy.
Infliximab Counseling:  I discussed with the patient the risks of infliximab including but not limited to myelosuppression, immunosuppression, autoimmune hepatitis, demyelinating diseases, lymphoma, and serious infections.  The patient understands that monitoring is required including a PPD at baseline and must alert us or the primary physician if symptoms of infection or other concerning signs are noted.
Colchicine Counseling:  Patient counseled regarding adverse effects including but not limited to stomach upset (nausea, vomiting, stomach pain, or diarrhea).  Patient instructed to limit alcohol consumption while taking this medication.  Colchicine may reduce blood counts especially with prolonged use.  The patient understands that monitoring of kidney function and blood counts may be required, especially at baseline. The patient verbalized understanding of the proper use and possible adverse effects of colchicine.  All of the patient's questions and concerns were addressed.
Dupixent Pregnancy And Lactation Text: This medication likely crosses the placenta but the risk for the fetus is uncertain. This medication is excreted in breast milk.
Doxycycline Counseling:  Patient counseled regarding possible photosensitivity and increased risk for sunburn.  Patient instructed to avoid sunlight, if possible.  When exposed to sunlight, patients should wear protective clothing, sunglasses, and sunscreen.  The patient was instructed to call the office immediately if the following severe adverse effects occur:  hearing changes, easy bruising/bleeding, severe headache, or vision changes.  The patient verbalized understanding of the proper use and possible adverse effects of doxycycline.  All of the patient's questions and concerns were addressed.
Adbry Counseling: I discussed with the patient the risks of tralokinumab including but not limited to eye infection and irritation, cold sores, injection site reactions, worsening of asthma, allergic reactions and increased risk of parasitic infection.  Live vaccines should be avoided while taking tralokinumab. The patient understands that monitoring is required and they must alert us or the primary physician if symptoms of infection or other concerning signs are noted.
Erivedge Counseling- I discussed with the patient the risks of Erivedge including but not limited to nausea, vomiting, diarrhea, constipation, weight loss, changes in the sense of taste, decreased appetite, muscle spasms, and hair loss.  The patient verbalized understanding of the proper use and possible adverse effects of Erivedge.  All of the patient's questions and concerns were addressed.
Finasteride Pregnancy And Lactation Text: This medication is absolutely contraindicated during pregnancy. It is unknown if it is excreted in breast milk.
Solaraze Counseling:  I discussed with the patient the risks of Solaraze including but not limited to erythema, scaling, itching, weeping, crusting, and pain.
Cyclophosphamide Counseling:  I discussed with the patient the risks of cyclophosphamide including but not limited to hair loss, hormonal abnormalities, decreased fertility, abdominal pain, diarrhea, nausea and vomiting, bone marrow suppression and infection. The patient understands that monitoring is required while taking this medication.
Rifampin Counseling: I discussed with the patient the risks of rifampin including but not limited to liver damage, kidney damage, red-orange body fluids, nausea/vomiting and severe allergy.
Aklief Pregnancy And Lactation Text: It is unknown if this medication is safe to use during pregnancy.  It is unknown if this medication is excreted in breast milk.  Breastfeeding women should use the topical cream on the smallest area of the skin for the shortest time needed while breastfeeding.  Do not apply to nipple and areola.
Opzelura Pregnancy And Lactation Text: There is insufficient data to evaluate drug-associated risk for major birth defects, miscarriage, or other adverse maternal or fetal outcomes.  There is a pregnancy registry that monitors pregnancy outcomes in pregnant persons exposed to the medication during pregnancy.  It is unknown if this medication is excreted in breast milk.  Do not breastfeed during treatment and for about 4 weeks after the last dose.
5-Fu Counseling: 5-Fluorouracil Counseling:  I discussed with the patient the risks of 5-fluorouracil including but not limited to erythema, scaling, itching, weeping, crusting, and pain.
Birth Control Pills Counseling: Birth Control Pill Counseling: I discussed with the patient the potential side effects of OCPs including but not limited to increased risk of stroke, heart attack, thrombophlebitis, deep venous thrombosis, hepatic adenomas, breast changes, GI upset, headaches, and depression.  The patient verbalized understanding of the proper use and possible adverse effects of OCPs. All of the patient's questions and concerns were addressed.
Xelebonyz Pregnancy And Lactation Text: This medication is Pregnancy Category D and is not considered safe during pregnancy.  The risk during breast feeding is also uncertain.
Topical Ketoconazole Counseling: Patient counseled that this medication may cause skin irritation or allergic reactions.  In the event of skin irritation, the patient was advised to reduce the amount of the drug applied or use it less frequently.   The patient verbalized understanding of the proper use and possible adverse effects of ketoconazole.  All of the patient's questions and concerns were addressed.
Enbrel Counseling:  I discussed with the patient the risks of etanercept including but not limited to myelosuppression, immunosuppression, autoimmune hepatitis, demyelinating diseases, lymphoma, and infections.  The patient understands that monitoring is required including a PPD at baseline and must alert us or the primary physician if symptoms of infection or other concerning signs are noted.
Oral Minoxidil Pregnancy And Lactation Text: This medication should only be used when clearly needed if you are pregnant, attempting to become pregnant or breast feeding.
Doxycycline Pregnancy And Lactation Text: This medication is Pregnancy Category D and not consider safe during pregnancy. It is also excreted in breast milk but is considered safe for shorter treatment courses.
Doxepin Counseling:  Patient advised that the medication is sedating and not to drive a car after taking this medication. Patient informed of potential adverse effects including but not limited to dry mouth, urinary retention, and blurry vision.  The patient verbalized understanding of the proper use and possible adverse effects of doxepin.  All of the patient's questions and concerns were addressed.
Stelara Counseling:  I discussed with the patient the risks of ustekinumab including but not limited to immunosuppression, malignancy, posterior leukoencephalopathy syndrome, and serious infections.  The patient understands that monitoring is required including a PPD at baseline and must alert us or the primary physician if symptoms of infection or other concerning signs are noted.
Zyclara Counseling:  I discussed with the patient the risks of imiquimod including but not limited to erythema, scaling, itching, weeping, crusting, and pain.  Patient understands that the inflammatory response to imiquimod is variable from person to person and was educated regarded proper titration schedule.  If flu-like symptoms develop, patient knows to discontinue the medication and contact us.
Griseofulvin Pregnancy And Lactation Text: This medication is Pregnancy Category X and is known to cause serious birth defects. It is unknown if this medication is excreted in breast milk but breast feeding should be avoided.
Azithromycin Counseling:  I discussed with the patient the risks of azithromycin including but not limited to GI upset, allergic reaction, drug rash, diarrhea, and yeast infections.
Solaraze Pregnancy And Lactation Text: This medication is Pregnancy Category B and is considered safe. There is some data to suggest avoiding during the third trimester. It is unknown if this medication is excreted in breast milk.
Imiquimod Counseling:  I discussed with the patient the risks of imiquimod including but not limited to erythema, scaling, itching, weeping, crusting, and pain.  Patient understands that the inflammatory response to imiquimod is variable from person to person and was educated regarded proper titration schedule.  If flu-like symptoms develop, patient knows to discontinue the medication and contact us.
Acitretin Pregnancy And Lactation Text: This medication is Pregnancy Category X and should not be given to women who are pregnant or may become pregnant in the future. This medication is excreted in breast milk.
Otezla Counseling: The side effects of Otezla were discussed with the patient, including but not limited to worsening or new depression, weight loss, diarrhea, nausea, upper respiratory tract infection, and headache. Patient instructed to call the office should any adverse effect occur.  The patient verbalized understanding of the proper use and possible adverse effects of Otezla.  All the patient's questions and concerns were addressed.
Picato Counseling:  I discussed with the patient the risks of Picato including but not limited to erythema, scaling, itching, weeping, crusting, and pain.
Low Dose Naltrexone Counseling- I discussed with the patient the potential risks and side effects of low dose naltrexone including but not limited to: more vivid dreams, headaches, nausea, vomiting, abdominal pain, fatigue, dizziness, and anxiety.
Cyclophosphamide Pregnancy And Lactation Text: This medication is Pregnancy Category D and it isn't considered safe during pregnancy. This medication is excreted in breast milk.
Litfulo Counseling: I discussed with the patient the risks of Litfulo therapy including but not limited to upper respiratory tract infections, shingles, cold sores, and nausea. Live vaccines should be avoided.  This medication has been linked to serious infections; higher rate of mortality; malignancy and lymphoproliferative disorders; major adverse cardiovascular events; thrombosis; gastrointestinal perforations; neutropenia; lymphopenia; anemia; liver enzyme elevations; and lipid elevations.
Adbry Pregnancy And Lactation Text: It is unknown if this medication will adversely affect pregnancy or breast feeding.
Libtayo Counseling- I discussed with the patient the risks of Libtayo including but not limited to nausea, vomiting, diarrhea, and bone or muscle pain.  The patient verbalized understanding of the proper use and possible adverse effects of Libtayo.  All of the patient's questions and concerns were addressed.
Doxepin Pregnancy And Lactation Text: This medication is Pregnancy Category C and it isn't known if it is safe during pregnancy. It is also excreted in breast milk and breast feeding isn't recommended.
Low Dose Naltrexone Pregnancy And Lactation Text: Naltrexone is pregnancy category C.  There have been no adequate and well-controlled studies in pregnant women.  It should be used in pregnancy only if the potential benefit justifies the potential risk to the fetus.   Limited data indicates that naltrexone is minimally excreted into breastmilk.
Birth Control Pills Pregnancy And Lactation Text: This medication should be avoided if pregnant and for the first 30 days post-partum.
Rituxan Counseling:  I discussed with the patient the risks of Rituxan infusions. Side effects can include infusion reactions, severe drug rashes including mucocutaneous reactions, reactivation of latent hepatitis and other infections and rarely progressive multifocal leukoencephalopathy.  All of the patient's questions and concerns were addressed.
Itraconazole Counseling:  I discussed with the patient the risks of itraconazole including but not limited to liver damage, nausea/vomiting, neuropathy, and severe allergy.  The patient understands that this medication is best absorbed when taken with acidic beverages such as non-diet cola or ginger ale.  The patient understands that monitoring is required including baseline LFTs and repeat LFTs at intervals.  The patient understands that they are to contact us or the primary physician if concerning signs are noted.
Cyclosporine Counseling:  I discussed with the patient the risks of cyclosporine including but not limited to hypertension, gingival hyperplasia,myelosuppression, immunosuppression, liver damage, kidney damage, neurotoxicity, lymphoma, and serious infections. The patient understands that monitoring is required including baseline blood pressure, CBC, CMP, lipid panel and uric acid, and then 1-2 times monthly CMP and blood pressure.
Azelaic Acid Counseling: Patient counseled that medicine may cause skin irritation and to avoid applying near the eyes.  In the event of skin irritation, the patient was advised to reduce the amount of the drug applied or use it less frequently.   The patient verbalized understanding of the proper use and possible adverse effects of azelaic acid.  All of the patient's questions and concerns were addressed.
Rifampin Pregnancy And Lactation Text: This medication is Pregnancy Category C and it isn't know if it is safe during pregnancy. It is also excreted in breast milk and should not be used if you are breast feeding.
Tranexamic Acid Counseling:  Patient advised of the small risk of bleeding problems with tranexamic acid. They were also instructed to call if they developed any nausea, vomiting or diarrhea. All of the patient's questions and concerns were addressed.
Zyclara Pregnancy And Lactation Text: This medication is Pregnancy Category C. It is unknown if this medication is excreted in breast milk.
Erythromycin Counseling:  I discussed with the patient the risks of erythromycin including but not limited to GI upset, allergic reaction, drug rash, diarrhea, increase in liver enzymes, and yeast infections.
Wartpeel Counseling:  I discussed with the patient the risks of Wartpeel including but not limited to erythema, scaling, itching, weeping, crusting, and pain.
Bexarotene Counseling:  I discussed with the patient the risks of bexarotene including but not limited to hair loss, dry lips/skin/eyes, liver abnormalities, hyperlipidemia, pancreatitis, depression/suicidal ideation, photosensitivity, drug rash/allergic reactions, hypothyroidism, anemia, leukopenia, infection, cataracts, and teratogenicity.  Patient understands that they will need regular blood tests to check lipid profile, liver function tests, white blood cell count, thyroid function tests and pregnancy test if applicable.
Otezla Pregnancy And Lactation Text: This medication is Pregnancy Category C and it isn't known if it is safe during pregnancy. It is unknown if it is excreted in breast milk.
Bimzelx Counseling:  I discussed with the patient the risks of Bimzelx including but not limited to depression, immunosuppression, allergic reactions and infections.  The patient understands that monitoring is required including a PPD at baseline and must alert us or the primary physician if symptoms of infection or other concerning signs are noted.
Drysol Counseling:  I discussed with the patient the risks of drysol/aluminum chloride including but not limited to skin rash, itching, irritation, burning.
Soolantra Counseling: I discussed with the patients the risks of topial Soolantra. This is a medicine which decreases the number of mites and inflammation in the skin. You experience burning, stinging, eye irritation or allergic reactions.  Please call our office if you develop any problems from using this medication.
Azithromycin Pregnancy And Lactation Text: This medication is considered safe during pregnancy and is also secreted in breast milk.
Hydroxyzine Counseling: Patient advised that the medication is sedating and not to drive a car after taking this medication.  Patient informed of potential adverse effects including but not limited to dry mouth, urinary retention, and blurry vision.  The patient verbalized understanding of the proper use and possible adverse effects of hydroxyzine.  All of the patient's questions and concerns were addressed.
Azelaic Acid Pregnancy And Lactation Text: This medication is considered safe during pregnancy and breast feeding.
Sarecycline Counseling: Patient advised regarding possible photosensitivity and discoloration of the teeth, skin, lips, tongue and gums.  Patient instructed to avoid sunlight, if possible.  When exposed to sunlight, patients should wear protective clothing, sunglasses, and sunscreen.  The patient was instructed to call the office immediately if the following severe adverse effects occur:  hearing changes, easy bruising/bleeding, severe headache, or vision changes.  The patient verbalized understanding of the proper use and possible adverse effects of sarecycline.  All of the patient's questions and concerns were addressed.
Litfulo Pregnancy And Lactation Text: Based on animal studies, Lifulo may cause embryo-fetal harm when administered to pregnant women.  The medication should not be used in pregnancy.  Breastfeeding is not recommended during treatment.
Dapsone Counseling: I discussed with the patient the risks of dapsone including but not limited to hemolytic anemia, agranulocytosis, rashes, methemoglobinemia, kidney failure, peripheral neuropathy, headaches, GI upset, and liver toxicity.  Patients who start dapsone require monitoring including baseline LFTs and weekly CBCs for the first month, then every month thereafter.  The patient verbalized understanding of the proper use and possible adverse effects of dapsone.  All of the patient's questions and concerns were addressed.
Acitretin Counseling:  I discussed with the patient the risks of acitretin including but not limited to hair loss, dry lips/skin/eyes, liver damage, hyperlipidemia, depression/suicidal ideation, photosensitivity.  Serious rare side effects can include but are not limited to pancreatitis, pseudotumor cerebri, bony changes, clot formation/stroke/heart attack.  Patient understands that alcohol is contraindicated since it can result in liver toxicity and significantly prolong the elimination of the drug by many years.
Opioid Counseling: I discussed with the patient the potential side effects of opioids including but not limited to addiction, altered mental status, and depression. I stressed avoiding alcohol, benzodiazepines, muscle relaxants and sleep aids unless specifically okayed by a physician. The patient verbalized understanding of the proper use and possible adverse effects of opioids. All of the patient's questions and concerns were addressed. They were instructed to flush the remaining pills down the toilet if they did not need them for pain.
Bexarotene Pregnancy And Lactation Text: This medication is Pregnancy Category X and should not be given to women who are pregnant or may become pregnant. This medication should not be used if you are breast feeding.
Erythromycin Pregnancy And Lactation Text: This medication is Pregnancy Category B and is considered safe during pregnancy. It is also excreted in breast milk.
Klisyri Counseling:  I discussed with the patient the risks of Klisyri including but not limited to erythema, scaling, itching, weeping, crusting, and pain.
Topical Metronidazole Counseling: Metronidazole is a topical antibiotic medication. You may experience burning, stinging, redness, or allergic reactions.  Please call our office if you develop any problems from using this medication.
Rituxan Pregnancy And Lactation Text: This medication is Pregnancy Category C and it isn't know if it is safe during pregnancy. It is unknown if this medication is excreted in breast milk but similar antibodies are known to be excreted.
Olumiant Counseling: I discussed with the patient the risks of Olumiant therapy including but not limited to upper respiratory tract infections, shingles, cold sores, and nausea. Live vaccines should be avoided.  This medication has been linked to serious infections; higher rate of mortality; malignancy and lymphoproliferative disorders; major adverse cardiovascular events; thrombosis; gastrointestinal perforations; neutropenia; lymphopenia; anemia; liver enzyme elevations; and lipid elevations.
Tranexamic Acid Pregnancy And Lactation Text: It is unknown if this medication is safe during pregnancy or breast feeding.
Taltz Counseling: I discussed with the patient the risks of ixekizumab including but not limited to immunosuppression, serious infections, worsening of inflammatory bowel disease and drug reactions.  The patient understands that monitoring is required including a PPD at baseline and must alert us or the primary physician if symptoms of infection or other concerning signs are noted.
Soolantra Pregnancy And Lactation Text: This medication is Pregnancy Category C. This medication is considered safe during breast feeding.
Libtayo Pregnancy And Lactation Text: This medication is contraindicated in pregnancy and when breast feeding.
Spironolactone Counseling: Patient advised regarding risks of diarrhea, abdominal pain, hyperkalemia, birth defects (for female patients), liver toxicity and renal toxicity. The patient may need blood work to monitor liver and kidney function and potassium levels while on therapy. The patient verbalized understanding of the proper use and possible adverse effects of spironolactone.  All of the patient's questions and concerns were addressed.
Niacinamide Counseling: I recommended taking niacin or niacinamide, also know as vitamin B3, twice daily. Recent evidence suggests that taking vitamin B3 (500 mg twice daily) can reduce the risk of actinic keratoses and non-melanoma skin cancers. Side effects of vitamin B3 include flushing and headache.
Bimzelx Pregnancy And Lactation Text: This medication crosses the placenta and the safety is uncertain during pregnancy. It is unknown if this medication is present in breast milk.
Protopic Counseling: Patient may experience a mild burning sensation during topical application. Protopic is not approved in children less than 2 years of age. There have been case reports of hematologic and skin malignancies in patients using topical calcineurin inhibitors although causality is questionable.
Bactrim Counseling:  I discussed with the patient the risks of sulfa antibiotics including but not limited to GI upset, allergic reaction, drug rash, diarrhea, dizziness, photosensitivity, and yeast infections.  Rarely, more serious reactions can occur including but not limited to aplastic anemia, agranulocytosis, methemoglobinemia, blood dyscrasias, liver or kidney failure, lung infiltrates or desquamative/blistering drug rashes.
Odomzo Counseling- I discussed with the patient the risks of Odomzo including but not limited to nausea, vomiting, diarrhea, constipation, weight loss, changes in the sense of taste, decreased appetite, muscle spasms, and hair loss.  The patient verbalized understanding of the proper use and possible adverse effects of Odomzo.  All of the patient's questions and concerns were addressed.
Niacinamide Pregnancy And Lactation Text: These medications are considered safe during pregnancy.
Spironolactone Pregnancy And Lactation Text: This medication can cause feminization of the male fetus and should be avoided during pregnancy. The active metabolite is also found in breast milk.
Dapsone Pregnancy And Lactation Text: This medication is Pregnancy Category C and is not considered safe during pregnancy or breast feeding.
Klisyri Pregnancy And Lactation Text: It is unknown if this medication can harm a developing fetus or if it is excreted in breast milk.
Topical Metronidazole Pregnancy And Lactation Text: This medication is Pregnancy Category B and considered safe during pregnancy.  It is also considered safe to use while breastfeeding.
Oxybutynin Counseling:  I discussed with the patient the risks of oxybutynin including but not limited to skin rash, drowsiness, dry mouth, difficulty urinating, and blurred vision.
Hydroxyzine Pregnancy And Lactation Text: This medication is not safe during pregnancy and should not be taken. It is also excreted in breast milk and breast feeding isn't recommended.
Benzoyl Peroxide Counseling: Patient counseled that medicine may cause skin irritation and bleach clothing.  In the event of skin irritation, the patient was advised to reduce the amount of the drug applied or use it less frequently.   The patient verbalized understanding of the proper use and possible adverse effects of benzoyl peroxide.  All of the patient's questions and concerns were addressed.
Ketoconazole Counseling:   Patient counseled regarding improving absorption with orange juice.  Adverse effects include but are not limited to breast enlargement, headache, diarrhea, nausea, upset stomach, liver function test abnormalities, taste disturbance, and stomach pain.  There is a rare possibility of liver failure that can occur when taking ketoconazole. The patient understands that monitoring of LFTs may be required, especially at baseline. The patient verbalized understanding of the proper use and possible adverse effects of ketoconazole.  All of the patient's questions and concerns were addressed.
Olumiant Pregnancy And Lactation Text: Based on animal studies, Olumiant may cause embryo-fetal harm when administered to pregnant women.  The medication should not be used in pregnancy.  Breastfeeding is not recommended during treatment.
Opioid Pregnancy And Lactation Text: These medications can lead to premature delivery and should be avoided during pregnancy. These medications are also present in breast milk in small amounts.
Valtrex Counseling: I discussed with the patient the risks of valacyclovir including but not limited to kidney damage, nausea, vomiting and severe allergy.  The patient understands that if the infection seems to be worsening or is not improving, they are to call.
Gabapentin Counseling: I discussed with the patient the risks of gabapentin including but not limited to dizziness, somnolence, fatigue and ataxia.
Isotretinoin Counseling: Patient should get monthly blood tests, not donate blood, not drive at night if vision affected, not share medication, and not undergo elective surgery for 6 months after tx completed. Side effects reviewed, pt to contact office should one occur.
Metronidazole Counseling:  I discussed with the patient the risks of metronidazole including but not limited to seizures, nausea/vomiting, a metallic taste in the mouth, nausea/vomiting and severe allergy.
Winlevi Counseling:  I discussed with the patient the risks of topical clascoterone including but not limited to erythema, scaling, itching, and stinging. Patient voiced their understanding.
Cimzia Counseling:  I discussed with the patient the risks of Cimzia including but not limited to immunosuppression, allergic reactions and infections.  The patient understands that monitoring is required including a PPD at baseline and must alert us or the primary physician if symptoms of infection or other concerning signs are noted.
Bactrim Pregnancy And Lactation Text: This medication is Pregnancy Category D and is known to cause fetal risk.  It is also excreted in breast milk.
Elidel Counseling: Patient may experience a mild burning sensation during topical application. Elidel is not approved in children less than 2 years of age. There have been case reports of hematologic and skin malignancies in patients using topical calcineurin inhibitors although causality is questionable.
Topical Retinoid counseling:  Patient advised to apply a pea-sized amount only at bedtime and wait 30 minutes after washing their face before applying.  If too drying, patient may add a non-comedogenic moisturizer. The patient verbalized understanding of the proper use and possible adverse effects of retinoids.  All of the patient's questions and concerns were addressed.
Siliq Counseling:  I discussed with the patient the risks of Siliq including but not limited to new or worsening depression, suicidal thoughts and behavior, immunosuppression, malignancy, posterior leukoencephalopathy syndrome, and serious infections.  The patient understands that monitoring is required including a PPD at baseline and must alert us or the primary physician if symptoms of infection or other concerning signs are noted. There is also a special program designed to monitor depression which is required with Siliq.
Methotrexate Counseling:  Patient counseled regarding adverse effects of methotrexate including but not limited to nausea, vomiting, abnormalities in liver function tests. Patients may develop mouth sores, rash, diarrhea, and abnormalities in blood counts. The patient understands that monitoring is required including LFT's and blood counts.  There is a rare possibility of scarring of the liver and lung problems that can occur when taking methotrexate. Persistent nausea, loss of appetite, pale stools, dark urine, cough, and shortness of breath should be reported immediately. Patient advised to discontinue methotrexate treatment at least three months before attempting to become pregnant.  I discussed the need for folate supplements while taking methotrexate.  These supplements can decrease side effects during methotrexate treatment. The patient verbalized understanding of the proper use and possible adverse effects of methotrexate.  All of the patient's questions and concerns were addressed.
Benzoyl Peroxide Pregnancy And Lactation Text: This medication is Pregnancy Category C. It is unknown if benzoyl peroxide is excreted in breast milk.
Protopic Pregnancy And Lactation Text: This medication is Pregnancy Category C. It is unknown if this medication is excreted in breast milk when applied topically.
Nsaids Counseling: NSAID Counseling: I discussed with the patient that NSAIDs should be taken with food. Prolonged use of NSAIDs can result in the development of stomach ulcers.  Patient advised to stop taking NSAIDs if abdominal pain occurs.  The patient verbalized understanding of the proper use and possible adverse effects of NSAIDs.  All of the patient's questions and concerns were addressed.
Winlevi Pregnancy And Lactation Text: This medication is considered safe during pregnancy and breastfeeding.
Ketoconazole Pregnancy And Lactation Text: This medication is Pregnancy Category C and it isn't know if it is safe during pregnancy. It is also excreted in breast milk and breast feeding isn't recommended.
Tetracycline Counseling: Patient counseled regarding possible photosensitivity and increased risk for sunburn.  Patient instructed to avoid sunlight, if possible.  When exposed to sunlight, patients should wear protective clothing, sunglasses, and sunscreen.  The patient was instructed to call the office immediately if the following severe adverse effects occur:  hearing changes, easy bruising/bleeding, severe headache, or vision changes.  The patient verbalized understanding of the proper use and possible adverse effects of tetracycline.  All of the patient's questions and concerns were addressed. Patient understands to avoid pregnancy while on therapy due to potential birth defects.
Metronidazole Pregnancy And Lactation Text: This medication is Pregnancy Category B and considered safe during pregnancy.  It is also excreted in breast milk.
Minoxidil Counseling: Minoxidil is a topical medication which can increase blood flow where it is applied. It is uncertain how this medication increases hair growth. Side effects are uncommon and include stinging and allergic reactions.
Isotretinoin Pregnancy And Lactation Text: This medication is Pregnancy Category X and is considered extremely dangerous during pregnancy. It is unknown if it is excreted in breast milk.
Tremfya Counseling: I discussed with the patient the risks of guselkumab including but not limited to immunosuppression, serious infections, and drug reactions.  The patient understands that monitoring is required including a PPD at baseline and must alert us or the primary physician if symptoms of infection or other concerning signs are noted.
Topical Steroids Counseling: I discussed with the patient that prolonged use of topical steroids can result in the increased appearance of superficial blood vessels (telangiectasias), lightening (hypopigmentation) and thinning of the skin (atrophy).  Patient understands to avoid using high potency steroids in skin folds, the groin or the face.  The patient verbalized understanding of the proper use and possible adverse effects of topical steroids.  All of the patient's questions and concerns were addressed.
Cephalexin Counseling: I counseled the patient regarding use of cephalexin as an antibiotic for prophylactic and/or therapeutic purposes. Cephalexin (commonly prescribed under brand name Keflex) is a cephalosporin antibiotic which is active against numerous classes of bacteria, including most skin bacteria. Side effects may include nausea, diarrhea, gastrointestinal upset, rash, hives, yeast infections, and in rare cases, hepatitis, kidney disease, seizures, fever, confusion, neurologic symptoms, and others. Patients with severe allergies to penicillin medications are cautioned that there is about a 10% incidence of cross-reactivity with cephalosporins. When possible, patients with penicillin allergies should use alternatives to cephalosporins for antibiotic therapy.
Azathioprine Counseling:  I discussed with the patient the risks of azathioprine including but not limited to myelosuppression, immunosuppression, hepatotoxicity, lymphoma, and infections.  The patient understands that monitoring is required including baseline LFTs, Creatinine, possible TPMP genotyping and weekly CBCs for the first month and then every 2 weeks thereafter.  The patient verbalized understanding of the proper use and possible adverse effects of azathioprine.  All of the patient's questions and concerns were addressed.
Propranolol Counseling:  I discussed with the patient the risks of propranolol including but not limited to low heart rate, low blood pressure, low blood sugar, restlessness and increased cold sensitivity. They should call the office if they experience any of these side effects.
Qbrexza Counseling:  I discussed with the patient the risks of Qbrexza including but not limited to headache, mydriasis, blurred vision, dry eyes, nasal dryness, dry mouth, dry throat, dry skin, urinary hesitation, and constipation.  Local skin reactions including erythema, burning, stinging, and itching can also occur.
Terbinafine Counseling: Patient counseling regarding adverse effects of terbinafine including but not limited to headache, diarrhea, rash, upset stomach, liver function test abnormalities, itching, taste/smell disturbance, nausea, abdominal pain, and flatulence.  There is a rare possibility of liver failure that can occur when taking terbinafine.  The patient understands that a baseline LFT and kidney function test may be required. The patient verbalized understanding of the proper use and possible adverse effects of terbinafine.  All of the patient's questions and concerns were addressed.
Methotrexate Pregnancy And Lactation Text: This medication is Pregnancy Category X and is known to cause fetal harm. This medication is excreted in breast milk.
Humira Counseling:  I discussed with the patient the risks of adalimumab including but not limited to myelosuppression, immunosuppression, autoimmune hepatitis, demyelinating diseases, lymphoma, and serious infections.  The patient understands that monitoring is required including a PPD at baseline and must alert us or the primary physician if symptoms of infection or other concerning signs are noted.
Rinvoq Counseling: I discussed with the patient the risks of Rinvoq therapy including but not limited to upper respiratory tract infections, shingles, cold sores, bronchitis, nausea, cough, fever, acne, and headache. Live vaccines should be avoided.  This medication has been linked to serious infections; higher rate of mortality; malignancy and lymphoproliferative disorders; major adverse cardiovascular events; thrombosis; thrombocytopenia, anemia, and neutropenia; lipid elevations; liver enzyme elevations; and gastrointestinal perforations.
Valtrex Pregnancy And Lactation Text: this medication is Pregnancy Category B and is considered safe during pregnancy. This medication is not directly found in breast milk but it's metabolite acyclovir is present.
Cimzia Pregnancy And Lactation Text: This medication crosses the placenta but can be considered safe in certain situations. Cimzia may be excreted in breast milk.
Albendazole Counseling:  I discussed with the patient the risks of albendazole including but not limited to cytopenia, kidney damage, nausea/vomiting and severe allergy.  The patient understands that this medication is being used in an off-label manner.
Minocycline Counseling: Patient advised regarding possible photosensitivity and discoloration of the teeth, skin, lips, tongue and gums.  Patient instructed to avoid sunlight, if possible.  When exposed to sunlight, patients should wear protective clothing, sunglasses, and sunscreen.  The patient was instructed to call the office immediately if the following severe adverse effects occur:  hearing changes, easy bruising/bleeding, severe headache, or vision changes.  The patient verbalized understanding of the proper use and possible adverse effects of minocycline.  All of the patient's questions and concerns were addressed.
Simponi Counseling:  I discussed with the patient the risks of golimumab including but not limited to myelosuppression, immunosuppression, autoimmune hepatitis, demyelinating diseases, lymphoma, and serious infections.  The patient understands that monitoring is required including a PPD at baseline and must alert us or the primary physician if symptoms of infection or other concerning signs are noted.
Topical Steroids Applications Pregnancy And Lactation Text: Most topical steroids are considered safe to use during pregnancy and lactation.  Any topical steroid applied to the breast or nipple should be washed off before breastfeeding.
Prednisone Counseling:  I discussed with the patient the risks of prolonged use of prednisone including but not limited to weight gain, insomnia, osteoporosis, mood changes, diabetes, susceptibility to infection, glaucoma and high blood pressure.  In cases where prednisone use is prolonged, patients should be monitored with blood pressure checks, serum glucose levels and an eye exam.  Additionally, the patient may need to be placed on GI prophylaxis, PCP prophylaxis, and calcium and vitamin D supplementation and/or a bisphosphonate.  The patient verbalized understanding of the proper use and the possible adverse effects of prednisone.  All of the patient's questions and concerns were addressed.
Carac Counseling:  I discussed with the patient the risks of Carac including but not limited to erythema, scaling, itching, weeping, crusting, and pain.
Dutasteride Male Counseling: Dustasteride Counseling:  I discussed with the patient the risks of use of dutasteride including but not limited to decreased libido, decreased ejaculate volume, and gynecomastia. Women who can become pregnant should not handle medication.  All of the patient's questions and concerns were addressed.
Rinvoq Pregnancy And Lactation Text: Based on animal studies, Rinvoq may cause embryo-fetal harm when administered to pregnant women.  The medication should not be used in pregnancy.  Breastfeeding is not recommended during treatment and for 6 days after the last dose.
Glycopyrrolate Counseling:  I discussed with the patient the risks of glycopyrrolate including but not limited to skin rash, drowsiness, dry mouth, difficulty urinating, and blurred vision.
Arava Counseling:  Patient counseled regarding adverse effects of Arava including but not limited to nausea, vomiting, abnormalities in liver function tests. Patients may develop mouth sores, rash, diarrhea, and abnormalities in blood counts. The patient understands that monitoring is required including LFTs and blood counts.  There is a rare possibility of scarring of the liver and lung problems that can occur when taking methotrexate. Persistent nausea, loss of appetite, pale stools, dark urine, cough, and shortness of breath should be reported immediately. Patient advised to discontinue Arava treatment and consult with a physician prior to attempting conception. The patient will have to undergo a treatment to eliminate Arava from the body prior to conception.
Eucrisa Counseling: Patient may experience a mild burning sensation during topical application. Eucrisa is not approved in children less than 3 months of age.
Tazorac Counseling:  Patient advised that medication is irritating and drying.  Patient may need to apply sparingly and wash off after an hour before eventually leaving it on overnight.  The patient verbalized understanding of the proper use and possible adverse effects of tazorac.  All of the patient's questions and concerns were addressed.
Nsaids Pregnancy And Lactation Text: These medications are considered safe up to 30 weeks gestation. It is excreted in breast milk.
High Dose Vitamin A Counseling: Side effects reviewed, pt to contact office should one occur.
VTAMA Counseling: I discussed with the patient that VTAMA is not for use in the eyes, mouth or mouth. They should call the office if they develop any signs of allergic reactions to VTAMA. The patient verbalized understanding of the proper use and possible adverse effects of VTAMA.  All of the patient's questions and concerns were addressed.

## 2024-01-10 ENCOUNTER — APPOINTMENT (OUTPATIENT)
Dept: URBAN - METROPOLITAN AREA SURGERY 18 | Age: 79
Setting detail: DERMATOLOGY
End: 2024-01-11

## 2024-01-10 VITALS — HEART RATE: 82 BPM | DIASTOLIC BLOOD PRESSURE: 55 MMHG | SYSTOLIC BLOOD PRESSURE: 104 MMHG

## 2024-01-10 PROBLEM — D04.61 CARCINOMA IN SITU OF SKIN OF RIGHT UPPER LIMB, INCLUDING SHOULDER: Status: ACTIVE | Noted: 2024-01-10

## 2024-01-10 PROCEDURE — 17312 MOHS ADDL STAGE: CPT | Mod: 79

## 2024-01-10 PROCEDURE — OTHER MOHS SURGERY: OTHER

## 2024-01-10 PROCEDURE — 17311 MOHS 1 STAGE H/N/HF/G: CPT | Mod: 79

## 2024-01-10 PROCEDURE — OTHER MIPS QUALITY: OTHER

## 2024-01-10 PROCEDURE — OTHER COUNSELING: OTHER

## 2024-01-10 NOTE — PROCEDURE: MOHS SURGERY
Mohs Case Number: 
Date Of Previous Biopsy (Optional): 10/4/2023
Previous Accession (Optional): [id number]
Biopsy Photograph Reviewed: Yes
Referring Physician (Optional): Valerie Mora MD
Consent Type: Consent 1 (Standard)
Eye Shield Used: No
Surgeon Performing Repair (Optional): Dr. Edmar Mora
Initial Size Of Lesion: 1.3
X Size Of Lesion In Cm (Optional): 1.1
Number Of Stages: 2
Primary Defect Length In Cm (Final Defect Size - Required For Flaps/Grafts): 2.3
Primary Defect Width In Cm (Final Defect Size - Required For Flaps/Grafts): 1.2
Repair Type: No repair - secondary intention
Which Eyelid Repair Cpt Are You Using?: 79898
Oculoplastic Surgeon Procedure Text (A): After obtaining clear surgical margins the patient was sent to oculoplastics for surgical repair.  The patient understands they will receive post-surgical care and follow-up from the referring physician's office.
Otolaryngologist Procedure Text (A): After obtaining clear surgical margins the patient was sent to otolaryngology for surgical repair.  The patient understands they will receive post-surgical care and follow-up from the referring physician's office.
Plastic Surgeon Procedure Text (A): After obtaining clear surgical margins the patient was sent to plastics for surgical repair.  The patient understands they will receive post-surgical care and follow-up from the referring physician's office.
Mid-Level Procedure Text (A): After obtaining clear surgical margins the patient was sent to a mid-level provider for surgical repair.  The patient understands they will receive post-surgical care and follow-up from the mid-level provider.
Provider Procedure Text (A): After obtaining clear surgical margins the defect was repaired by another provider.
Asc Procedure Text (A): After obtaining clear surgical margins the patient was sent to an ASC for surgical repair.  The patient understands they will receive post-surgical care and follow-up from the ASC physician.
Simple / Intermediate / Complex Repair - Final Wound Length In Cm: 0
Suturegard Retention Suture: 2-0 Nylon
Retention Suture Bite Size: 3 mm
Length To Time In Minutes Device Was In Place: 10
Number Of Hemigard Strips Per Side: 1
Undermining Type: Entire Wound
Debridement Text: The wound edges were debrided prior to proceeding with the closure to facilitate wound healing.
Helical Rim Text: The closure involved the helical rim.
Vermilion Border Text: The closure involved the vermilion border.
Nostril Rim Text: The closure involved the nostril rim.
Retention Suture Text: Retention sutures were placed to support the closure and prevent dehiscence.
Location Indication Override (Is Already Calculated Based On Selected Body Location): Area H
Area H Indication Text: Tumors in this location are included in Area H (eyelids, eyebrows, nose, lips, chin, ear, pre-auricular, post-auricular, temple, genitalia, hands, feet, ankles and areola).  Tissue conservation is critical in these anatomic locations.
Area M Indication Text: Tumors in this location are included in Area M (cheek, forehead, scalp, neck, jawline and pretibial skin).  Mohs surgery is indicated for tumors in these anatomic locations.
Area L Indication Text: Tumors in this location are included in Area L (trunk and extremities).  Mohs surgery is indicated for larger tumors, or tumors with aggressive histologic features, in these anatomic locations.
Tumor Debulked?: scalpel
Depth Of Tumor Invasion (For Histology): epidermis
Perineural Invasion (For Histology - Be Specific If Possible): absent
Special Stains Stage 1 - Results: Base On Clearance Noted Above
Stage 2: Additional Anesthesia Volume In Cc: 1.5
Stage 2: Additional Anesthesia Type: 1% lidocaine with 1:100,000 epinephrine and 408mcg clindamycin/ml and a 1:10 solution of 8.4% sodium bicarbonate
Staging Info: By selecting yes to the question above you will include information on AJCC 8 tumor staging in your Mohs note. Information on tumor staging will be automatically added for SCCs on the head and neck. AJCC 8 includes tumor size, tumor depth, perineural involvement and bone invasion.
Tumor Depth: Less than 6mm from granular layer and no invasion beyond the subcutaneous fat
Was The Patient On Physician Recommended Anticoagulation Therapy?: Please Select the Appropriate Response
Medical Necessity Statement: Based on my medical judgement, Mohs surgery is the most appropriate treatment for this cancer compared to other treatments.
Alternatives Discussed Intro (Do Not Add Period): I discussed alternative treatments to Mohs surgery and specifically discussed the risks and benefits of
Consent 1/Introductory Paragraph: The rationale for Mohs was explained to the patient and consent was obtained. The risks, benefits and alternatives to therapy were discussed in detail. Specifically, the risks of infection, scarring, bleeding, prolonged wound healing, incomplete removal, allergy to anesthesia, nerve injury and recurrence were addressed. Prior to the procedure, the treatment site was clearly identified and confirmed by the patient. All components of Universal Protocol/PAUSE Rule completed.
Consent 2/Introductory Paragraph: Mohs surgery was explained to the patient and consent was obtained. The risks, benefits and alternatives to therapy were discussed in detail. Specifically, the risks of infection, scarring, bleeding, prolonged wound healing, incomplete removal, allergy to anesthesia, nerve injury and recurrence were addressed. Prior to the procedure, the treatment site was clearly identified and confirmed by the patient. All components of Universal Protocol/PAUSE Rule completed.
Consent 3/Introductory Paragraph: I gave the patient a chance to ask questions they had about the procedure.  Following this I explained the Mohs procedure and consent was obtained. The risks, benefits and alternatives to therapy were discussed in detail. Specifically, the risks of infection, scarring, bleeding, prolonged wound healing, incomplete removal, allergy to anesthesia, nerve injury and recurrence were addressed. Prior to the procedure, the treatment site was clearly identified and confirmed by the patient. All components of Universal Protocol/PAUSE Rule completed.
Consent (Temporal Branch)/Introductory Paragraph: The rationale for Mohs was explained to the patient and consent was obtained. The risks, benefits and alternatives to therapy were discussed in detail. Specifically, the risks of damage to the temporal branch of the facial nerve, infection, scarring, bleeding, prolonged wound healing, incomplete removal, allergy to anesthesia, and recurrence were addressed. Prior to the procedure, the treatment site was clearly identified and confirmed by the patient. All components of Universal Protocol/PAUSE Rule completed.
Consent (Marginal Mandibular)/Introductory Paragraph: The rationale for Mohs was explained to the patient and consent was obtained. The risks, benefits and alternatives to therapy were discussed in detail. Specifically, the risks of damage to the marginal mandibular branch of the facial nerve, infection, scarring, bleeding, prolonged wound healing, incomplete removal, allergy to anesthesia, and recurrence were addressed. Prior to the procedure, the treatment site was clearly identified and confirmed by the patient. All components of Universal Protocol/PAUSE Rule completed.
Consent (Spinal Accessory)/Introductory Paragraph: The rationale for Mohs was explained to the patient and consent was obtained. The risks, benefits and alternatives to therapy were discussed in detail. Specifically, the risks of damage to the spinal accessory nerve, infection, scarring, bleeding, prolonged wound healing, incomplete removal, allergy to anesthesia, and recurrence were addressed. Prior to the procedure, the treatment site was clearly identified and confirmed by the patient. All components of Universal Protocol/PAUSE Rule completed.
Consent (Near Eyelid Margin)/Introductory Paragraph: The rationale for Mohs was explained to the patient and consent was obtained. The risks, benefits and alternatives to therapy were discussed in detail. Specifically, the risks of ectropion or eyelid deformity, infection, scarring, bleeding, prolonged wound healing, incomplete removal, allergy to anesthesia, nerve injury and recurrence were addressed. Prior to the procedure, the treatment site was clearly identified and confirmed by the patient. All components of Universal Protocol/PAUSE Rule completed.
Consent (Ear)/Introductory Paragraph: The rationale for Mohs was explained to the patient and consent was obtained. The risks, benefits and alternatives to therapy were discussed in detail. Specifically, the risks of ear deformity, infection, scarring, bleeding, prolonged wound healing, incomplete removal, allergy to anesthesia, nerve injury and recurrence were addressed. Prior to the procedure, the treatment site was clearly identified and confirmed by the patient. All components of Universal Protocol/PAUSE Rule completed.
Consent (Nose)/Introductory Paragraph: The rationale for Mohs was explained to the patient and consent was obtained. The risks, benefits and alternatives to therapy were discussed in detail. Specifically, the risks of nasal deformity, changes in the flow of air through the nose, infection, scarring, bleeding, prolonged wound healing, incomplete removal, allergy to anesthesia, nerve injury and recurrence were addressed. Prior to the procedure, the treatment site was clearly identified and confirmed by the patient. All components of Universal Protocol/PAUSE Rule completed.
Consent (Lip)/Introductory Paragraph: The rationale for Mohs was explained to the patient and consent was obtained. The risks, benefits and alternatives to therapy were discussed in detail. Specifically, the risks of lip deformity, changes in the oral aperture, infection, scarring, bleeding, prolonged wound healing, incomplete removal, allergy to anesthesia, nerve injury and recurrence were addressed. Prior to the procedure, the treatment site was clearly identified and confirmed by the patient. All components of Universal Protocol/PAUSE Rule completed.
Consent (Scalp)/Introductory Paragraph: The rationale for Mohs was explained to the patient and consent was obtained. The risks, benefits and alternatives to therapy were discussed in detail. Specifically, the risks of changes in hair growth pattern secondary to repair, infection, scarring, bleeding, prolonged wound healing, incomplete removal, allergy to anesthesia, nerve injury and recurrence were addressed. Prior to the procedure, the treatment site was clearly identified and confirmed by the patient. All components of Universal Protocol/PAUSE Rule completed.
Detail Level: Detailed
Postop Diagnosis: same
Anesthesia Volume In Cc: 3
Hemostasis: Electrodesiccation
Estimated Blood Loss (Cc): minimal
Stage 8: Additional Anesthesia Type: 1% lidocaine with epinephrine
Brow Lift Text: A midfrontal incision was made medially to the defect to allow access to the tissues just superior to the left eyebrow. Following careful dissection inferiorly in a supraperiosteal plane to the level of the left eyebrow, several 3-0 monocryl sutures were used to resuspend the eyebrow orbicularis oculi muscular unit to the superior frontal bone periosteum. This resulted in an appropriate reapproximation of static eyebrow symmetry and correction of the left brow ptosis.
Epidermal Closure: none-secondary intention
Suturegard Intro: Intraoperative tissue expansion was performed, utilizing the SUTUREGARD device, in order to reduce wound tension.
Suturegard Body: The suture ends were repeatedly re-tightened and re-clamped to achieve the desired tissue expansion.
Hemigard Intro: Due to skin fragility and wound tension, it was decided to use HEMIGARD adhesive retention suture devices to permit a linear closure. The skin was cleaned and dried for a 6cm distance away from the wound. Excessive hair, if present, was removed to allow for adhesion.
Hemigard Postcare Instructions: The HEMIGARD strips are to remain completely dry for at least 5-7 days.
Donor Site Anesthesia Type: same as repair anesthesia
Graft Basting Suture (Optional): 5-0 Fast Absorbing Gut
Graft Donor Site Dermal Sutures (Optional): 4-0 Monocryl
Epidermal Closure Graft Donor Site (Optional): simple interrupted
Graft Donor Site Bandage (Optional-Leave Blank If You Don't Want In Note): Vaseline, telfa pad, and hypoallergenic tape were applied to the donor site.
Closure 2 Information: This tab is for additional flaps and grafts, including complex repair and grafts and complex repair and flaps. You can also specify a different location for the additional defect, if the location is the same you do not need to select a new one. We will insert the automated text for the repair you select below just as we do for solitary flaps and grafts. Please note that at this time if you select a location with a different insurance zone you will need to override the ICD10 and CPT if appropriate.
Closure 3 Information: This tab is for additional flaps and grafts above and beyond our usual structured repairs.  Please note if you enter information here it will not currently bill and you will need to add the billing information manually.
Wound Care: Gelfoam
Dressing: pressure dressing with telfa
Wound Care (No Sutures): Petrolatum
Dressing (No Sutures): dry sterile dressing
Wound Check: 6 days
Unna Boot Text: An Unna boot was placed to help immobilize the limb and facilitate more rapid healing.
Home Suture Removal Text: Patient was provided instructions on removing sutures and will remove their sutures at home.  If they have any questions or difficulties they will call the office.
Post-Care Instructions: I reviewed with the patient in detail post-care instructions. Patient is not to engage in any heavy lifting, exercise, or swimming for the next 14 days. Should the patient develop any fevers, chills, bleeding, severe pain patient will contact the office immediately.
Pain Refusal Text: I offered to prescribe pain medication but the patient refused to take this medication.
Mauc Instructions: By selecting yes to the question below the MAUC number will be added into the note.  This will be calculated automatically based on the diagnosis chosen, the size entered, the body zone selected (H,M,L) and the specific indications you chose. You will also have the option to override the Mohs AUC if you disagree with the automatically calculated number and this option is found in the Case Summary tab.
Where Do You Want The Question To Include Opioid Counseling Located?: Case Summary Tab
Eye Protection Verbiage: Before proceeding with the stage, a plastic scleral shield was inserted. The globe was anesthetized with a few drops of 1% lidocaine with 1:100,000 epinephrine. Then, an appropriate sized scleral shield was chosen and coated with lacrilube ointment. The shield was gently inserted and left in place for the duration of each stage. After the stage was completed, the shield was gently removed.
Mohs Method Verbiage: An incision at a 45 degree angle following the standard Mohs approach was done and the specimen was harvested as a microscopic controlled layer.
Surgeon/Pathologist Verbiage (Will Incorporate Name Of Surgeon From Intro If Not Blank): operated in two distinct and integrated capacities as the surgeon and pathologist.
Mohs Histo Method Verbiage: Each section was then chromacoded and processed in the Mohs lab using the Mohs protocol and submitted for frozen section.
Subsequent Stages Histo Method Verbiage: Using a similar technique to that described above, a thin layer of tissue was removed from all areas where tumor was visible on the previous stage.  The tissue was again oriented, mapped, dyed, and processed as above.
Mohs Rapid Report Verbiage: The area of clinically evident tumor was marked with skin marking ink and appropriately hatched.  The initial incision was made following the Mohs approach through the skin.  The specimen was taken to the lab, divided into the necessary number of pieces, chromacoded and processed according to the Mohs protocol.  This was repeated in successive stages until a tumor free defect was achieved.
Complex Repair Preamble Text (Leave Blank If You Do Not Want): The risks, benefits, and possible outcomes of this procedure were discussed along with the risks, benefits, and possible outcomes of other options for wound repair (including but not limited to: variations of complex closure, flaps, grafts, and second intention). The patient verbalized understanding and consent to the outlined procedure. The patient understands should the surgical site heal in a manner they find unsatisfactory further interventions or referral to an additional specialist may be required. Following sterile preparation and drape, two standing cones were removed at opposite ends of the postoperative defect within, or parallel to, the relaxed skin tension lines. The resulting defect was undermined widely and bilaterally in the appropriate surgical plane taking care to preserve local arteries, veins, nerves, and other structures of anatomic importance. Hemostasis was achieved and then the wound was sutured together in layered fashion.
Intermediate Repair Preamble Text (Leave Blank If You Do Not Want): The risks, benefits, and possible outcomes of this procedure were discussed along with the risks, benefits, and possible outcomes of other options for wound repair (including but not limited to: variations of complex closure, flaps, grafts, and second intention). The patient verbalized understanding and consent to the outlined procedure. The patient understands should the surgical site heal in a manner they find unsatisfactory further interventions or referral to an additional specialist may be required. Following sterile preparation and drape, two standing cones were removed at opposite ends of the postoperative defect (as needed) within, or parallel to, the relaxed skin tension lines. The resulting defect was undermined, as necessary, in the appropriate surgical plane taking care to preserve local arteries, veins, nerves, and other structures of anatomic importance. Hemostasis was achieved and then the wound was sutured together in layered fashion.
Crescentic Complex Repair Preamble Text (Leave Blank If You Do Not Want): Extensive wide undermining was performed.
Crescentic Intermediate Repair Preamble Text (Leave Blank If You Do Not Want): Undermining was performed with blunt dissection.
Non-Graft Cartilage Fenestration Text: The cartilage was fenestrated with a 2mm punch biopsy to help facilitate healing.
Graft Cartilage Fenestration Text: The cartilage was fenestrated with a 2mm punch biopsy to help facilitate graft survival and healing.
Secondary Intention Text (Leave Blank If You Do Not Want): Given the location of the defect, depth of the postoperative wound and inherent tension at the surgical site, healing by secondary intention was deemed most appropriate for wound closure. The risks, benefits, and possible outcomes of this procedure were discussed along with the risks, benefits, and possible outcomes of other options for wound repair (including but not limited to: complex closure, flaps, and grafts). The patient verbalized understanding and voiced a preference for secondary intention over reconstruction. The patient understands that not all healing occurs in predictable fashion or within a predictable time period. If the resulting scar is unsatisfactory, affects/distorts a free margin or other adjacent structures, or is slow to heal the patient understands further surgical intervention or even referral to an additional specialist may be required.
No Repair - Repaired With Adjacent Surgical Defect Text (Leave Blank If You Do Not Want): After obtaining clear surgical margins the defect was repaired concurrently with another surgical defect which was in close approximation.
Adjacent Tissue Transfer Text: The defect edges were debeveled with a #15 scalpel blade.  Given the location of the defect and the proximity to free margins an adjacent tissue transfer was deemed most appropriate.  Using a sterile surgical marker, an appropriate flap was drawn incorporating the defect and placing the expected incisions within the relaxed skin tension lines where possible.    The area thus outlined was incised deep to adipose tissue with a #15 scalpel blade.  The skin margins were undermined to an appropriate distance in all directions utilizing iris scissors.
Advancement Flap (Single) Text: Given the location of the defect, inherent tension at the surgical site, and the proximity to free margins an advancement flap was deemed most appropriate for wound reconstruction. The risks, benefits, and possible outcomes of this procedure were discussed along with the risks, benefits, and possible outcomes of other options for wound repair (including but not limited to: complex closure, other flaps, grafts, and second intention). The patient verbalized understanding and consent to the outlined procedure. The patient verbalized understanding that intraoperative conditions may necessitate a change in the outlined procedure resulting in modification of the original surgical plan. This decision may be made at the discretion of the surgeon, and is due to factors subject to change or that are difficult to predict preoperatively. Using a sterile surgical marker, an appropriate advancement flap was drawn incorporating the defect and placing the expected incisions within the relaxed skin tension lines where possible. The surgical site and surrounding skin were prepped and draped in sterile fashion.  Standing cones were removed from opposite ends of the defect within the appropriate surgical plane, repositioning as necessary based upon local tension, proximity to free margins/other anatomic structures, and position of the relaxed skin tension lines. The resulting defect was undermined widely and bilaterally in the appropriate surgical plane taking care to preserve local arteries, veins, nerves, and other structures of anatomic importance. This created a sliding flap capable of advancing across the defect to close the wound under minimal tension. Hemostasis was achieved and then the wound was sutured in layered fashion.
Advancement Flap (Double) Text: Given the location of the defect, inherent tension at the surgical site, and the proximity to free margins a double (bilateral) advancement flap was deemed most appropriate for wound reconstruction. The risks, benefits, and possible outcomes of this procedure were discussed along with the risks, benefits, and possible outcomes of other options for wound repair (including but not limited to: complex closure, other flaps, grafts, and second intention). The patient verbalized understanding and consent to the outlined procedure. The patient verbalized understanding that intraoperative conditions may necessitate a change in the outlined procedure resulting in modification of the original surgical plan. This decision may be made at the discretion of the surgeon, and is due to factors subject to change or that are difficult to predict preoperatively. Using a sterile surgical marker, an appropriate bilateral advancement flap was drawn incorporating the defect and placing the expected incisions within the relaxed skin tension lines where possible. The surgical site and surrounding skin were prepped and draped in sterile fashion.  Standing cones were removed from opposite ends of the defect within the appropriate surgical plane, repositioning as necessary based upon local tension, proximity to free margins/other anatomic structures, and position of the relaxed skin tension lines. The resulting defect was undermined widely and bilaterally in the appropriate surgical plane taking care to preserve local arteries, veins, nerves, and other structures of anatomic importance. This created a sliding flap capable of advancing bilaterally across the defect to close the wound under minimal tension. Hemostasis was achieved and then the wound was sutured in layered fashion.
Burow's Advancement Flap Text: The defect edges were debeveled with a #15 scalpel blade.  Given the location of the defect and the proximity to free margins a Burow's advancement flap was deemed most appropriate.  Using a sterile surgical marker, the appropriate advancement flap was drawn incorporating the defect and placing the expected incisions within the relaxed skin tension lines where possible.    The area thus outlined was incised deep to adipose tissue with a #15 scalpel blade.  The skin margins were undermined to an appropriate distance in all directions utilizing iris scissors.
Chonodrocutaneous Helical Advancement Flap Text: Given the location of the defect, inherent tension at the surgical site, and the proximity to free margins a Burow’s advancement flap was deemed most appropriate for wound reconstruction. The risks, benefits, and possible outcomes of this procedure were discussed along with the risks, benefits, and possible outcomes of other options for wound repair (including but not limited to: complex closure, other flaps, grafts, and second intention). The patient verbalized understanding and consent to the outlined procedure. The patient verbalized understanding that intraoperative conditions may necessitate a change in the outlined procedure resulting in modification of the original surgical plan. This decision may be made at the discretion of the surgeon, and is due to factors subject to change or that are difficult to predict preoperatively. Using a sterile surgical marker, an appropriate Burow’s advancement flap was drawn incorporating the defect and placing the expected incisions within the relaxed skin tension lines where possible. The surgical site and surrounding skin were prepped and draped in sterile fashion.  Standing cones were removed from opposite ends of the defect within the appropriate surgical plane, repositioning as necessary based upon local tension, proximity to free margins/other anatomic structures, and position of the relaxed skin tension lines. The position of one dog ear was modified, moving the standing cone along a lateral plane to lie in a more favorable location for closure.  The resulting defect was undermined widely and bilaterally in the appropriate surgical plane taking care to preserve local arteries, veins, nerves, and other structures of anatomic importance. This created a sliding flap capable of advancing across the defect to close the wound under minimal tension. Hemostasis was achieved and then the wound was sutured in layered fashion.
Crescentic Advancement Flap Text: Given the location of the defect, inherent tension at the surgical site, and the proximity to free margins a Crescentic advancement flap was deemed most appropriate for wound reconstruction. The risks, benefits, and possible outcomes of this procedure were discussed along with the risks, benefits, and possible outcomes of other options for wound repair (including but not limited to: complex closure, other flaps, grafts, and second intention). The patient verbalized understanding and consent to the outlined procedure. The patient verbalized understanding that intraoperative conditions may necessitate a change in the outlined procedure resulting in modification of the original surgical plan. This decision may be made at the discretion of the surgeon, and is due to factors subject to change or that are difficult to predict preoperatively. Using a sterile surgical marker, an appropriate Crescentic advancement flap was drawn incorporating the defect and placing the expected incisions within the relaxed skin tension lines where possible. The surgical site and surrounding skin were prepped and draped in sterile fashion.  Standing cones were removed from opposite ends of the defect within the appropriate surgical plane, repositioning as necessary based upon local tension, proximity to free margins/other anatomic structures, and position of the relaxed skin tension lines. The shape of one standing cone was modified, designed in a crescentic shape to allow for sliding movement in both the horizontal and vertical plane.  The resulting defect was undermined widely and bilaterally in the appropriate surgical plane taking care to preserve local arteries, veins, nerves, and other structures of anatomic importance. This created a sliding flap capable of advancing across the defect to close the wound under minimal tension. Hemostasis was achieved and then the wound was sutured in layered fashion.
A-T Advancement Flap Text: Given the location of the defect, inherent tension at the surgical site, and the proximity to free margins an A to T advancement flap was deemed most appropriate for wound reconstruction. The risks, benefits, and possible outcomes of this procedure were discussed along with the risks, benefits, and possible outcomes of other options for wound repair (including but not limited to: complex closure, other flaps, grafts, and second intention). The patient verbalized understanding and consent to the outlined procedure. The patient verbalized understanding that intraoperative conditions may necessitate a change in the outlined procedure resulting in modification of the original surgical plan. This decision may be made at the discretion of the surgeon, and is due to factors subject to change or that are difficult to predict preoperatively. Using a sterile surgical marker, an appropriate A to T advancement flap was drawn incorporating the defect and placing the expected incisions within the relaxed skin tension lines where possible. The surgical site and surrounding skin were prepped and draped in sterile fashion.  Standing cones were removed from opposite ends of the defect within the appropriate surgical plane, repositioning as necessary based upon local tension, proximity to free margins/other anatomic structures, and position of the relaxed skin tension lines. The shape of one standing cone was modified, taking two smaller standing cones (each approximately half the size of the traditional standing cone) and repositioning them along opposite ends of a lateral plane (with respect to the defect).  The resulting defect was undermined widely and bilaterally in the appropriate surgical plane taking care to preserve local arteries, veins, nerves, and other structures of anatomic importance. This created a sliding flap capable of advancing across the defect to close the wound under minimal tension. Hemostasis was achieved and then the wound was sutured in layered fashion.
O-T Advancement Flap Text: The defect edges were debeveled with a #15 scalpel blade.  Given the location of the defect, shape of the defect and the proximity to free margins an O-T advancement flap was deemed most appropriate.  Using a sterile surgical marker, an appropriate advancement flap was drawn incorporating the defect and placing the expected incisions within the relaxed skin tension lines where possible.    The area thus outlined was incised deep to adipose tissue with a #15 scalpel blade.  The skin margins were undermined to an appropriate distance in all directions utilizing iris scissors.
O-L Flap Text: The defect edges were debeveled with a #15 scalpel blade.  Given the location of the defect, shape of the defect and the proximity to free margins an O-L flap was deemed most appropriate.  Using a sterile surgical marker, an appropriate advancement flap was drawn incorporating the defect and placing the expected incisions within the relaxed skin tension lines where possible.    The area thus outlined was incised deep to adipose tissue with a #15 scalpel blade.  The skin margins were undermined to an appropriate distance in all directions utilizing iris scissors.
O-Z Flap Text: The defect edges were debeveled with a #15 scalpel blade.  Given the location of the defect, shape of the defect and the proximity to free margins an O-Z flap was deemed most appropriate.  Using a sterile surgical marker, an appropriate transposition flap was drawn incorporating the defect and placing the expected incisions within the relaxed skin tension lines where possible. The area thus outlined was incised deep to adipose tissue with a #15 scalpel blade.  The skin margins were undermined to an appropriate distance in all directions utilizing iris scissors.
Double O-Z Flap Text: The defect edges were debeveled with a #15 scalpel blade.  Given the location of the defect, shape of the defect and the proximity to free margins a Double O-Z flap was deemed most appropriate.  Using a sterile surgical marker, an appropriate transposition flap was drawn incorporating the defect and placing the expected incisions within the relaxed skin tension lines where possible. The area thus outlined was incised deep to adipose tissue with a #15 scalpel blade.  The skin margins were undermined to an appropriate distance in all directions utilizing iris scissors.
V-Y Flap Text: Given the location of the defect, inherent tension at the surgical site, and the proximity to free margins an island pedicle advancement flap was deemed most appropriate for wound reconstruction. The risks, benefits, and possible outcomes of this procedure were discussed along with the risks, benefits, and possible outcomes of other options for wound repair (including but not limited to: complex closure, other flaps, grafts, and second intention). The patient verbalized understanding and consent to the outlined procedure. The patient verbalized understanding that intraoperative conditions may necessitate a change in the outlined procedure resulting in modification of the original surgical plan. This decision may be made at the discretion of the surgeon, and is due to factors subject to change or that are difficult to predict preoperatively. \\n\\nUsing a sterile surgical marker, an appropriate island pedicle advancement flap was drawn incorporating the defect and placing the expected incisions within the relaxed skin tension lines where possible. The surgical site and surrounding skin were prepped and draped in sterile fashion.  The island pedicle advancement flap was incised and undermined within the appropriate surgical plane, creating a mobile flap supported by a central vascular pedicle.  The wound edges surrounding the flap and primary defect were undermined widely and bilaterally in the appropriate surgical plane taking care to preserve local arteries, veins, nerves, and other structures of anatomic importance. This created a sliding flap capable of advancing across the defect to close the wound under minimal tension. Hemostasis was achieved and then the wound was sutured in layered fashion.
Advancement-Rotation Flap Text: The defect edges were debeveled with a #15 scalpel blade.  Given the location of the defect, shape of the defect and the proximity to free margins an advancement-rotation flap was deemed most appropriate.  Using a sterile surgical marker, an appropriate flap was drawn incorporating the defect and placing the expected incisions within the relaxed skin tension lines where possible. The area thus outlined was incised deep to adipose tissue with a #15 scalpel blade.  The skin margins were undermined to an appropriate distance in all directions utilizing iris scissors.
Mercedes Flap Text: Given the location of the defect, inherent tension at the surgical site, and the proximity to free margins a Mercedes (Tripolar) Advancement Flap was deemed most appropriate for wound reconstruction. The risks, benefits, and possible outcomes of this procedure were discussed along with the risks, benefits, and possible outcomes of other options for wound repair (including but not limited to: complex closure, other flaps, grafts, and second intention). The patient verbalized understanding and consent to the outlined procedure. The patient verbalized understanding that intraoperative conditions may necessitate a change in the outlined procedure resulting in modification of the original surgical plan. This decision may be made at the discretion of the surgeon, and is due to factors subject to change or that are difficult to predict preoperatively. Using a sterile surgical marker, an appropriate tripolar advancement flap was drawn incorporating the defect and placing the expected incisions within the relaxed skin tension lines where possible. The surgical site and surrounding skin were prepped and draped in sterile fashion.  Anesthesia was checked and supplemented as necessary. Standing cones were removed from three “poles” of the defect within the appropriate surgical plane, repositioning as necessary based upon local tension, proximity to free margins/other anatomic structures, and position of the relaxed skin tension lines. The resulting defect was undermined widely and bilaterally in the appropriate surgical plane taking care to preserve local arteries, veins, nerves, and other structures of anatomic importance. This created a sliding flap capable of advancing across the defect from three reservoirs to close the wound under minimal tension. Hemostasis was achieved and then the wound was sutured in layered fashion.
Modified Advancement Flap Text: Given the location of the defect, inherent tension at the surgical site, and the proximity to free margins a modified advancement flap was deemed most appropriate for wound reconstruction. The risks, benefits, and possible outcomes of this procedure were discussed along with the risks, benefits, and possible outcomes of other options for wound repair (including but not limited to: complex closure, other flaps, grafts, and second intention). The patient verbalized understanding and consent to the outlined procedure. The patient verbalized understanding that intraoperative conditions may necessitate a change in the outlined procedure resulting in modification of the original surgical plan. This decision may be made at the discretion of the surgeon, and is due to factors subject to change or that are difficult to predict preoperatively. Using a sterile surgical marker, an appropriate advancement flap was drawn incorporating the defect and placing the expected incisions within the relaxed skin tension lines where possible.\\n\\nThe surgical site and surrounding skin were prepped and draped in sterile fashion.  Standing cones were removed from opposite ends of the defect within the appropriate surgical plane, repositioning as necessary based upon local tension, proximity to free margins/other anatomic structures, and position of the relaxed skin tension lines. The resulting defect was undermined widely and bilaterally in the appropriate surgical plane taking care to preserve local arteries, veins, nerves, and other structures of anatomic importance. This created a sliding flap capable of advancing across the defect to close the wound under minimal tension. Hemostasis was achieved and then the wound was sutured in layered fashion.
Mucosal Advancement Flap Text: Given the location of the defect, shape of the defect and the proximity to free margins a mucosal advancement flap was deemed most appropriate. Incisions were made with a 15 blade scalpel in the appropriate fashion along the cutaneous vermilion border and the mucosal lip. The remaining actinically damaged mucosal tissue was excised.  The mucosal advancement flap was then elevated to the gingival sulcus with care taken to preserve the neurovascular structures and advanced into the primary defect. Care was taken to ensure that precise realignment of the vermilion border was achieved.
Peng Advancement Flap Text: The defect edges were debeveled with a #15 scalpel blade.  Given the location of the defect, shape of the defect and the proximity to free margins a Peng advancement flap was deemed most appropriate.  Using a sterile surgical marker, an appropriate advancement flap was drawn incorporating the defect and placing the expected incisions within the relaxed skin tension lines where possible. The area thus outlined was incised deep to adipose tissue with a #15 scalpel blade.  The skin margins were undermined to an appropriate distance in all directions utilizing iris scissors.
Hatchet Flap Text: The defect edges were debeveled with a #15 scalpel blade.  Given the location of the defect, shape of the defect and the proximity to free margins a hatchet flap was deemed most appropriate.  Using a sterile surgical marker, an appropriate hatchet flap was drawn incorporating the defect and placing the expected incisions within the relaxed skin tension lines where possible.    The area thus outlined was incised deep to adipose tissue with a #15 scalpel blade.  The skin margins were undermined to an appropriate distance in all directions utilizing iris scissors.
Rotation Flap Text: Given the location of the defect, inherent tension at the surgical site, and the proximity to free margins a rotation flap was deemed most appropriate for wound reconstruction. The risks, benefits, and possible outcomes of this procedure were discussed along with the risks, benefits, and possible outcomes of other options for wound repair (including but not limited to: complex closure, other flaps, grafts, and second intention). The patient verbalized understanding and consent to the outlined procedure. The patient verbalized understanding that intraoperative conditions may necessitate a change in the outlined procedure resulting in modification of the original surgical plan. This decision may be made at the discretion of the surgeon, and is due to factors subject to change or that are difficult to predict preoperatively. Using a sterile surgical marker, an appropriate rotation flap was drawn incorporating the defect and placing the expected incisions within the relaxed skin tension lines where possible. The area thus outlined was incised deep to adipose tissue with a #15 scalpel blade. The resulting defect was undermined widely and bilaterally in the appropriate surgical plane taking care to preserve local arteries, veins, nerves, and other structures of anatomic importance. This created a flap capable of rotating across the defect to close the wound under minimal tension. Hemostasis was obtained and then the flap was sutured together in layered fashion.
Bilateral Rotation Flap Text: The defect edges were debeveled with a #15 scalpel blade. Given the location of the defect, shape of the defect and the proximity to free margins a bilateral rotation flap was deemed most appropriate. Using a sterile surgical marker, an appropriate rotation flap was drawn incorporating the defect and placing the expected incisions within the relaxed skin tension lines where possible. The area thus outlined was incised deep to adipose tissue with a #15 scalpel blade. The skin margins were undermined to an appropriate distance in all directions utilizing iris scissors. Following this, the designed flap was carried over into the primary defect and sutured into place.
Spiral Flap Text: The defect edges were debeveled with a #15 scalpel blade.  Given the location of the defect, shape of the defect and the proximity to free margins a spiral flap was deemed most appropriate.  Using a sterile surgical marker, an appropriate rotation flap was drawn incorporating the defect and placing the expected incisions within the relaxed skin tension lines where possible. The area thus outlined was incised deep to adipose tissue with a #15 scalpel blade.  The skin margins were undermined to an appropriate distance in all directions utilizing iris scissors.
Staged Advancement Flap Text: The defect edges were debeveled with a #15 scalpel blade.  Given the location of the defect, shape of the defect and the proximity to free margins a staged advancement flap was deemed most appropriate.  Using a sterile surgical marker, an appropriate advancement flap was drawn incorporating the defect and placing the expected incisions within the relaxed skin tension lines where possible. The area thus outlined was incised deep to adipose tissue with a #15 scalpel blade.  The skin margins were undermined to an appropriate distance in all directions utilizing iris scissors.
Star Wedge Flap Text: The defect edges were debeveled with a #15 scalpel blade.  Given the location of the defect, shape of the defect and the proximity to free margins a star wedge flap was deemed most appropriate.  Using a sterile surgical marker, an appropriate rotation flap was drawn incorporating the defect and placing the expected incisions within the relaxed skin tension lines where possible. The area thus outlined was incised deep to adipose tissue with a #15 scalpel blade.  The skin margins were undermined to an appropriate distance in all directions utilizing iris scissors.
Transposition Flap Text: The defect edges were debeveled with a #15 scalpel blade.  Given the location of the defect and the proximity to free margins a transposition flap was deemed most appropriate.  Using a sterile surgical marker, an appropriate transposition flap was drawn incorporating the defect.    The area thus outlined was incised deep to adipose tissue with a #15 scalpel blade.  The skin margins were undermined to an appropriate distance in all directions utilizing iris scissors.
Muscle Hinge Flap Text: The defect edges were debeveled with a #15 scalpel blade.  Given the size, depth and location of the defect and the proximity to free margins a muscle hinge flap was deemed most appropriate.  Using a sterile surgical marker, an appropriate hinge flap was drawn incorporating the defect. The area thus outlined was incised with a #15 scalpel blade.  The skin margins were undermined to an appropriate distance in all directions utilizing iris scissors.
Mustarde Flap Text: The defect edges were debeveled with a #15 scalpel blade.  Given the size, depth and location of the defect and the proximity to free margins a Mustarde flap was deemed most appropriate.  Using a sterile surgical marker, an appropriate flap was drawn incorporating the defect. The area thus outlined was incised with a #15 scalpel blade.  The skin margins were undermined to an appropriate distance in all directions utilizing iris scissors.
Nasal Turnover Hinge Flap Text: The defect edges were debeveled with a #15 scalpel blade.  Given the size, depth, location of the defect and the defect being full thickness a nasal turnover hinge flap was deemed most appropriate.  Using a sterile surgical marker, an appropriate hinge flap was drawn incorporating the defect. The area thus outlined was incised with a #15 scalpel blade. The flap was designed to recreate the nasal mucosal lining and the alar rim. The skin margins were undermined to an appropriate distance in all directions utilizing iris scissors.
Nasalis-Muscle-Based Myocutaneous Island Pedicle Flap Text: Using a #15 blade, an incision was made around the donor flap to the level of the nasalis muscle. Wide lateral undermining was then performed in both the subcutaneous plane above the nasalis muscle, and in a submuscular plane just above periosteum. This allowed the formation of a free nasalis muscle axial pedicle (based on the angular artery) which was still attached to the actual cutaneous flap, increasing its mobility and vascular viability. Hemostasis was obtained with pinpoint electrocoagulation. The flap was mobilized into position and the pivotal anchor points positioned and stabilized with buried interrupted sutures. Subcutaneous and dermal tissues were closed in a multilayered fashion with sutures. Tissue redundancies were excised, and the epidermal edges were apposed without significant tension and sutured with sutures.
Orbicularis Oris Muscle Flap Text: The defect edges were debeveled with a #15 scalpel blade.  Given that the defect affected the competency of the oral sphincter an orbicularis oris muscle flap was deemed most appropriate to restore this competency and normal muscle function.  Using a sterile surgical marker, an appropriate flap was drawn incorporating the defect. The area thus outlined was incised with a #15 scalpel blade.
Melolabial Transposition Flap Text: Given the size/depth/location of the defect, inherent tension at the surgical site, and the proximity to free margins, a one stage nasolabial transposition flap was deemed most appropriate for wound reconstruction. The risks, benefits, and possible outcomes of this procedure were discussed along with the risks, benefits, and possible outcomes of other options for wound repair (including but not limited to: complex closure, other flaps, grafts, second intention, and combinations of these procedures). The patient verbalized understanding and consent to the outlined procedure. The patient verbalized understanding that intraoperative conditions may necessitate a change in the outlined procedure resulting in modification of the original surgical plan. This decision may be made at the discretion of the surgeon, and is due to factors subject to change or that are difficult to predict preoperatively. We also discussed complex defects involving multiple cosmetic subunits often necessitate several smaller planned surgical revisions spread out over the course of many months in order to restore appropriate tissue volume, cosmetic landmarks, and diminish scarring. In many cases anatomic “semblance” is possible, but complete parity is not. The patient understands that in some cases referral to additional specialists for additional procedures may be required. \\nUsing a sterile surgical marker, an appropriate nasolabial transposition flap was drawn incorporating the defect and placing the expected incisions within the relaxed skin tension lines where possible. The surgical site and surrounding skin were prepped and draped in sterile fashion. Anesthesia was checked and supplemented as necessary.  A single standing cone was removed along one border of the surgical defect. The flap and second standing cone were incised and raised from the opposite pole as the standing cone in the appropriate surgical plane. The entire flap and area surrounding the removed standing cones was undermined widely and bilaterally in the appropriate surgical plane taking care to preserve local arteries, veins, nerves, and other structures of anatomic importance. The area along the distal nasal portion of the post Mohs defect was undermined just enough to place deep buried vertical mattress sutures. This created a flap capable of transposing into the defect to drape the wound under minimal tension. Hemostasis was achieved. A single anchoring buried vertical mattress suture was placed to advance/transpose the flap into position without distortion of the surrounding free margins. The remaining flap was sutured in layered fashion.
Rhombic Flap Text: Given the location of the defect, inherent tension at the surgical site, and the proximity to free margins a rhombic transposition flap was deemed most appropriate for wound reconstruction. The risks, benefits, and possible outcomes of this procedure were discussed along with the risks, benefits, and possible outcomes of other options for wound repair (including but not limited to: complex closure, other flaps, grafts, and second intention). The patient verbalized understanding and consent to the outlined procedure. The patient verbalized understanding that intraoperative conditions may necessitate a change in the outlined procedure resulting in modification of the original surgical plan. This decision may be made at the discretion of the surgeon, and is due to factors subject to change or that are difficult to predict preoperatively. Using a sterile surgical marker, an appropriate rhombic transposition flap was drawn incorporating the defect and placing the expected incisions within the relaxed skin tension lines where possible. The surgical site and surrounding skin were prepped and draped in sterile fashion.  A single standing cone was removed at the expected pivot point of the transposition flap in the appropriate surgical plane, repositioning as necessary based upon local tension, proximity to free margins/other anatomic structures, and position of the relaxed skin tension lines. The flap (designed approximately 90 degrees from one surgical axis) was incised and raised in the appropriate surgical plane. The resulting defect was undermined widely and bilaterally in the appropriate surgical plane taking care to preserve local arteries, veins, nerves, and other structures of anatomic importance. This created a flap capable of transposing across the defect to close the wound under minimal tension, without distortion of adjacent free margins. Hemostasis was achieved and then the wound was sutured in layered fashion.
Bi-Rhombic Flap Text: The defect edges were debeveled with a #15 scalpel blade.  Given the location of the defect and the proximity to free margins a bi-rhombic flap was deemed most appropriate.  Using a sterile surgical marker, an appropriate rhombic flap was drawn incorporating the defect. The area thus outlined was incised deep to adipose tissue with a #15 scalpel blade.  The skin margins were undermined to an appropriate distance in all directions utilizing iris scissors.
Helical Rim Advancement Flap Text: Given the location of the defect, inherent tension at the surgical site, and the proximity to free margins a helical rim advancement flap was deemed most appropriate for wound reconstruction. The risks, benefits, and possible outcomes of this procedure were discussed along with the risks, benefits, and possible outcomes of other options for wound repair (including but not limited to: complex closure, other flaps, grafts, and second intention). The patient verbalized understanding and consent to the outlined procedure. The patient verbalized understanding that intraoperative conditions may necessitate a change in the outlined procedure resulting in modification of the original surgical plan. This decision may be made at the discretion of the surgeon, and is due to factors subject to change or that are difficult to predict preoperatively. Using a sterile surgical marker, an appropriate helical rim advancement flap was drawn incorporating the defect and placing the expected incisions within the relaxed skin tension lines where possible. The surgical site and surrounding skin were prepped and draped in sterile fashion. Anesthesia was checked and supplemented as necessary. A single standing cone was then excised posterior to the defect. A linear arc-like incision was removed at the anterior aspect of the defect and carried to a point of relative auricular skin laxity. A second dog ear was then removed at the end of this incision. The flap was undermined widely and bilaterally in the appropriate surgical plane. Hemostasis was obtained and then the flap was sutured together in layered fashion.
Bilateral Helical Rim Advancement Flap Text: The defect edges were debeveled with a #15 blade scalpel.  Given the location of the defect and the proximity to free margins (helical rim) a bilateral helical rim advancement flap was deemed most appropriate.  Using a sterile surgical marker, the appropriate advancement flaps were drawn incorporating the defect and placing the expected incisions between the helical rim and antihelix where possible.  The area thus outlined was incised through and through with a #15 scalpel blade.  With a skin hook and iris scissors, the flaps were gently and sharply undermined and freed up.
Ear Star Wedge Flap Text: The defect edges were debeveled with a #15 blade scalpel.  Given the location of the defect and the proximity to free margins (helical rim) an ear star wedge flap was deemed most appropriate.  Using a sterile surgical marker, the appropriate flap was drawn incorporating the defect and placing the expected incisions between the helical rim and antihelix where possible.  The area thus outlined was incised through and through with a #15 scalpel blade.
Banner Transposition Flap Text: The defect edges were debeveled with a #15 scalpel blade.  Given the location of the defect and the proximity to free margins a Banner transposition flap was deemed most appropriate.  Using a sterile surgical marker, an appropriate flap drawn around the defect. The area thus outlined was incised deep to adipose tissue with a #15 scalpel blade.  The skin margins were undermined to an appropriate distance in all directions utilizing iris scissors.
Bilobed Flap Text: Given the location of the defect, inherent tension at the surgical site, and the proximity to free margins a bilobe transposition flap was deemed most appropriate for wound reconstruction. The risks, benefits, and possible outcomes of this procedure were discussed along with the risks, benefits, and possible outcomes of other options for wound repair (including but not limited to: complex closure, other flaps, grafts, and second intention). The patient verbalized understanding and consent to the outlined procedure. The patient verbalized understanding that intraoperative conditions may necessitate a change in the outlined procedure resulting in modification of the original surgical plan. This decision may be made at the discretion of the surgeon, and is due to factors subject to change or that are difficult to predict preoperatively. We also discussed that should the patient heal with a scar they find noticeable/unfavorable additional surgical procedures and/or referral to additional specialists may be required. The patient understands the healing process takes place over many months and that the \"final\" appearance of the scar line often takes a year or longer to achieve.\\n\\nUsing a sterile surgical marker, an appropriate bilobed transposition flap was drawn incorporating the defect and placing the expected incisions within the relaxed skin tension lines where possible. The surgical site and surrounding skin were prepped and draped in sterile fashion.  A single standing cone was removed at the expected pivot point of the transposition flap in the appropriate surgical plane, repositioning as necessary based upon local tension, proximity to free margins/other anatomic structures, and position of the relaxed skin tension lines. The flap (containing two lobes  by 45 degrees, each proportionate in size to the postop defect) was incised and raised in the appropriate surgical plane. The resulting defect was undermined widely and bilaterally in the appropriate surgical plane taking care to preserve local arteries, veins, nerves, and other structures of anatomic importance. This created a flap capable of transposing across the defect to close the wound under minimal tension, without distortion of adjacent free margins. Hemostasis was achieved and then the wound was sutured in layered fashion.
Trilobed Flap Text: Given the location of the defect, inherent tension at the surgical site, and the proximity to free margins a trilobed transposition flap was deemed most appropriate for wound reconstruction. The risks, benefits, and possible outcomes of this procedure were discussed along with the risks, benefits, and possible outcomes of other options for wound repair (including but not limited to: complex closure, other flaps, grafts, and second intention). The patient verbalized understanding and consent to the outlined procedure. The patient verbalized understanding that intraoperative conditions may necessitate a change in the outlined procedure resulting in modification of the original surgical plan. This decision may be made at the discretion of the surgeon, and is due to factors subject to change or that are difficult to predict preoperatively. We also discussed that should the patient heal with a scar they find noticeable/unfavorable additional surgical procedures and/or referral to additional specialists may be required. The patient understands the healing process takes place over many months and that the \"final\" appearance of the scar line often takes a year or longer to achieve.\\nUsing a sterile surgical marker, an appropriate trilobe transposition flap was drawn incorporating the defect and placing the expected incisions within the relaxed skin tension lines where possible. The surgical site and surrounding skin were prepped and draped in sterile fashion.  A single standing cone was removed at the expected pivot point of the transposition flap in the appropriate surgical plane, repositioning as necessary based upon local tension, proximity to free margins/other anatomic structures, and position of the relaxed skin tension lines. The flap (containing three lobes  by 45 degrees, each proportionate in size to the postop defect) was incised and raised in the appropriate surgical plane. The resulting defect was undermined widely and bilaterally in the appropriate surgical plane taking care to preserve local arteries, veins, nerves, and other structures of anatomic importance. This created a flap capable of transposing across the defect to close the wound under minimal tension, without distortion of adjacent free margins. Hemostasis was achieved and then the wound was sutured in layered fashion.
Dorsal Nasal Flap Text: Given the location of the defect, inherent tension at the surgical site, and the proximity to free margins a dorsal nasal flap was deemed most appropriate for wound reconstruction. The risks, benefits, and possible outcomes of this procedure were discussed along with the risks, benefits, and possible outcomes of other options for wound repair (including but not limited to: complex closure, other flaps, grafts, and second intention). The patient verbalized understanding and consent to the outlined procedure. The patient verbalized understanding that intraoperative conditions may necessitate a change in the outlined procedure resulting in modification of the original surgical plan. This decision may be made at the discretion of the surgeon, and is due to factors subject to change or that are difficult to predict preoperatively. We also discussed that should the patient heal with a scar they find noticeable/unfavorable additional surgical procedures and/or referral to additional specialists may be required. The patient understands the healing process takes place over many months and that the \"final\" appearance of the scar line often takes a year or longer to achieve.\\nUsing a sterile surgical marker, an appropriate dorsal nasal flap was drawn incorporating the defect and placing the expected incisions within the relaxed skin tension lines where possible. The surgical site and surrounding skin were prepped and draped in sterile fashion.  The area thus outlined was incised in the appropriate surgical plane with a #15 scalpel blade. The resulting defect was undermined widely and bilaterally in the appropriate surgical plane taking care to preserve local arteries, veins, nerves, and other structures of anatomic importance. This created a flap capable of rotating across the defect to close the wound under minimal tension. Hemostasis was obtained and then the flap was sutured together in layered fashion.
Island Pedicle Flap With Canthal Suspension Text: The defect edges were debeveled with a #15 scalpel blade.  Given the location of the defect, shape of the defect and the proximity to free margins an island pedicle advancement flap was deemed most appropriate.  Using a sterile surgical marker, an appropriate advancement flap was drawn incorporating the defect, outlining the appropriate donor tissue and placing the expected incisions within the relaxed skin tension lines where possible. The area thus outlined was incised deep to adipose tissue with a #15 scalpel blade.  The skin margins were undermined to an appropriate distance in all directions around the primary defect and laterally outward around the island pedicle utilizing iris scissors.  There was minimal undermining beneath the pedicle flap. A suspension suture was placed in the canthal tendon to prevent tension and prevent ectropion.
Alar Island Pedicle Flap Text: The defect edges were debeveled with a #15 scalpel blade.  Given the location of the defect, shape of the defect and the proximity to the alar rim an island pedicle advancement flap was deemed most appropriate.  Using a sterile surgical marker, an appropriate advancement flap was drawn incorporating the defect, outlining the appropriate donor tissue and placing the expected incisions within the nasal ala running parallel to the alar rim. The area thus outlined was incised with a #15 scalpel blade.  The skin margins were undermined minimally to an appropriate distance in all directions around the primary defect and laterally outward around the island pedicle utilizing iris scissors.  There was minimal undermining beneath the pedicle flap.
Double Island Pedicle Flap Text: The defect edges were debeveled with a #15 scalpel blade.  Given the location of the defect, shape of the defect and the proximity to free margins a double island pedicle advancement flap was deemed most appropriate.  Using a sterile surgical marker, an appropriate advancement flap was drawn incorporating the defect, outlining the appropriate donor tissue and placing the expected incisions within the relaxed skin tension lines where possible.    The area thus outlined was incised deep to adipose tissue with a #15 scalpel blade.  The skin margins were undermined to an appropriate distance in all directions around the primary defect and laterally outward around the island pedicle utilizing iris scissors.  There was minimal undermining beneath the pedicle flap.
Island Pedicle Flap-Requiring Vessel Identification Text: The defect edges were debeveled with a #15 scalpel blade.  Given the location of the defect, shape of the defect and the proximity to free margins an island pedicle advancement flap was deemed most appropriate.  Using a sterile surgical marker, an appropriate advancement flap was drawn, based on the axial vessel mentioned above, incorporating the defect, outlining the appropriate donor tissue and placing the expected incisions within the relaxed skin tension lines where possible.    The area thus outlined was incised deep to adipose tissue with a #15 scalpel blade.  The skin margins were undermined to an appropriate distance in all directions around the primary defect and laterally outward around the island pedicle utilizing iris scissors.  There was minimal undermining beneath the pedicle flap.
Keystone Flap Text: The defect edges were debeveled with a #15 scalpel blade.  Given the location of the defect, shape of the defect a keystone flap was deemed most appropriate.  Using a sterile surgical marker, an appropriate keystone flap was drawn incorporating the defect, outlining the appropriate donor tissue and placing the expected incisions within the relaxed skin tension lines where possible. The area thus outlined was incised deep to adipose tissue with a #15 scalpel blade.  The skin margins were undermined to an appropriate distance in all directions around the primary defect and laterally outward around the flap utilizing iris scissors.
O-T Plasty Text: The defect edges were debeveled with a #15 scalpel blade.  Given the location of the defect, shape of the defect and the proximity to free margins an O-T plasty was deemed most appropriate.  Using a sterile surgical marker, an appropriate O-T plasty was drawn incorporating the defect and placing the expected incisions within the relaxed skin tension lines where possible.    The area thus outlined was incised deep to adipose tissue with a #15 scalpel blade.  The skin margins were undermined to an appropriate distance in all directions utilizing iris scissors.
O-Z Plasty Text: The defect edges were debeveled with a #15 scalpel blade.  Given the location of the defect, shape of the defect and the proximity to free margins an O-Z plasty (double transposition flap) was deemed most appropriate.  Using a sterile surgical marker, the appropriate transposition flaps were drawn incorporating the defect and placing the expected incisions within the relaxed skin tension lines where possible.    The area thus outlined was incised deep to adipose tissue with a #15 scalpel blade.  The skin margins were undermined to an appropriate distance in all directions utilizing iris scissors.  Hemostasis was achieved with electrocautery.  The flaps were then transposed into place, one clockwise and the other counterclockwise, and anchored with interrupted buried subcutaneous sutures.
Double O-Z Plasty Text: The defect edges were debeveled with a #15 scalpel blade.  Given the location of the defect, shape of the defect and the proximity to free margins a Double O-Z plasty (double transposition flap) was deemed most appropriate.  Using a sterile surgical marker, the appropriate transposition flaps were drawn incorporating the defect and placing the expected incisions within the relaxed skin tension lines where possible. The area thus outlined was incised deep to adipose tissue with a #15 scalpel blade.  The skin margins were undermined to an appropriate distance in all directions utilizing iris scissors.  Hemostasis was achieved with electrocautery.  The flaps were then transposed into place, one clockwise and the other counterclockwise, and anchored with interrupted buried subcutaneous sutures.
V-Y Plasty Text: The defect edges were debeveled with a #15 scalpel blade.  Given the location of the defect, shape of the defect and the proximity to free margins an V-Y advancement flap was deemed most appropriate.  Using a sterile surgical marker, an appropriate advancement flap was drawn incorporating the defect and placing the expected incisions within the relaxed skin tension lines where possible.    The area thus outlined was incised deep to adipose tissue with a #15 scalpel blade.  The skin margins were undermined to an appropriate distance in all directions utilizing iris scissors.
H Plasty Text: Given the location of the defect, shape of the defect and the proximity to free margins a H-plasty was deemed most appropriate for repair.  Using a sterile surgical marker, the appropriate advancement arms of the H-plasty were drawn incorporating the defect and placing the expected incisions within the relaxed skin tension lines where possible. The area thus outlined was incised deep to adipose tissue with a #15 scalpel blade. The skin margins were undermined to an appropriate distance in all directions utilizing iris scissors.  The opposing advancement arms were then advanced into place in opposite direction and anchored with interrupted buried subcutaneous sutures.
W Plasty Text: The lesion was extirpated to the level of the fat with a #15 scalpel blade.  Given the location of the defect, shape of the defect and the proximity to free margins a W-plasty was deemed most appropriate for repair.  Using a sterile surgical marker, the appropriate transposition arms of the W-plasty were drawn incorporating the defect and placing the expected incisions within the relaxed skin tension lines where possible.    The area thus outlined was incised deep to adipose tissue with a #15 scalpel blade.  The skin margins were undermined to an appropriate distance in all directions utilizing iris scissors.  The opposing transposition arms were then transposed into place in opposite direction and anchored with interrupted buried subcutaneous sutures.
Z Plasty Text: The lesion was extirpated to the level of the fat with a #15 scalpel blade.  Given the location of the defect, shape of the defect and the proximity to free margins a Z-plasty was deemed most appropriate for repair.  Using a sterile surgical marker, the appropriate transposition arms of the Z-plasty were drawn incorporating the defect and placing the expected incisions within the relaxed skin tension lines where possible.    The area thus outlined was incised deep to adipose tissue with a #15 scalpel blade.  The skin margins were undermined to an appropriate distance in all directions utilizing iris scissors.  The opposing transposition arms were then transposed into place in opposite direction and anchored with interrupted buried subcutaneous sutures.
Double Z Plasty Text: The lesion was extirpated to the level of the fat with a #15 scalpel blade. Given the location of the defect, shape of the defect and the proximity to free margins a double Z-plasty was deemed most appropriate for repair. Using a sterile surgical marker, the appropriate transposition arms of the double Z-plasty were drawn incorporating the defect and placing the expected incisions within the relaxed skin tension lines where possible. The area thus outlined was incised deep to adipose tissue with a #15 scalpel blade. The skin margins were undermined to an appropriate distance in all directions utilizing iris scissors. The opposing transposition arms were then transposed and carried over into place in opposite direction and anchored with interrupted buried subcutaneous sutures.
Zygomaticofacial Flap Text: Given the location of the defect, shape of the defect and the proximity to free margins a zygomaticofacial flap was deemed most appropriate for repair.  Using a sterile surgical marker, the appropriate flap was drawn incorporating the defect and placing the expected incisions within the relaxed skin tension lines where possible. The area thus outlined was incised deep to adipose tissue with a #15 scalpel blade with preservation of a vascular pedicle.  The skin margins were undermined to an appropriate distance in all directions utilizing iris scissors.  The flap was then placed into the defect and anchored with interrupted buried subcutaneous sutures.
Cheek Interpolation Flap Text: A decision was made to reconstruct the defect utilizing an interpolation axial flap and a staged reconstruction.  A telfa template was made of the defect.  This telfa template was then used to outline the Cheek Interpolation flap.  The donor area for the pedicle flap was then injected with anesthesia.  The flap was excised through the skin and subcutaneous tissue down to the layer of the underlying musculature.  The interpolation flap was carefully excised within this deep plane to maintain its blood supply.  The edges of the donor site were undermined.   The donor site was closed in a primary fashion.  The pedicle was then rotated into position and sutured.  Once the tube was sutured into place, adequate blood supply was confirmed with blanching and refill.  The pedicle was then wrapped with xeroform gauze and dressed appropriately with a telfa and gauze bandage to ensure continued blood supply and protect the attached pedicle.
Cheek-To-Nose Interpolation Flap Text: A decision was made to reconstruct the defect utilizing an interpolation axial flap and a staged reconstruction.  A telfa template was made of the defect.  This telfa template was then used to outline the Cheek-To-Nose Interpolation flap.  The donor area for the pedicle flap was then injected with anesthesia.  The flap was excised through the skin and subcutaneous tissue down to the layer of the underlying musculature.  The interpolation flap was carefully excised within this deep plane to maintain its blood supply.  The edges of the donor site were undermined.   The donor site was closed in a primary fashion.  The pedicle was then rotated into position and sutured.  Once the tube was sutured into place, adequate blood supply was confirmed with blanching and refill.  The pedicle was then wrapped with xeroform gauze and dressed appropriately with a telfa and gauze bandage to ensure continued blood supply and protect the attached pedicle.
Interpolation Flap Text: A decision was made to reconstruct the defect utilizing an interpolation axial flap and a staged reconstruction.  A telfa template was made of the defect.  This telfa template was then used to outline the interpolation flap.  The donor area for the pedicle flap was then injected with anesthesia.  The flap was excised through the skin and subcutaneous tissue down to the layer of the underlying musculature.  The interpolation flap was carefully excised within this deep plane to maintain its blood supply.  The edges of the donor site were undermined.   The donor site was closed in a primary fashion.  The pedicle was then rotated into position and sutured.  Once the tube was sutured into place, adequate blood supply was confirmed with blanching and refill.  The pedicle was then wrapped with xeroform gauze and dressed appropriately with a telfa and gauze bandage to ensure continued blood supply and protect the attached pedicle.
Melolabial Interpolation Flap Text: A decision was made to reconstruct the defect utilizing an interpolation axial flap and a staged reconstruction.  A telfa template was made of the defect.  This telfa template was then used to outline the melolabial interpolation flap.  The donor area for the pedicle flap was then injected with anesthesia.  The flap was excised through the skin and subcutaneous tissue down to the layer of the underlying musculature.  The pedicle flap was carefully excised within this deep plane to maintain its blood supply.  The edges of the donor site were undermined.   The donor site was closed in a primary fashion.  The pedicle was then rotated into position and sutured.  Once the tube was sutured into place, adequate blood supply was confirmed with blanching and refill.  The pedicle was then wrapped with xeroform gauze and dressed appropriately with a telfa and gauze bandage to ensure continued blood supply and protect the attached pedicle.
Mastoid Interpolation Flap Text: A decision was made to reconstruct the defect utilizing an interpolation axial flap and a staged reconstruction.  A telfa template was made of the defect.  This telfa template was then used to outline the mastoid interpolation flap.  The donor area for the pedicle flap was then injected with anesthesia.  The flap was excised through the skin and subcutaneous tissue down to the layer of the underlying musculature.  The pedicle flap was carefully excised within this deep plane to maintain its blood supply.  The edges of the donor site were undermined.   The donor site was closed in a primary fashion.  The pedicle was then rotated into position and sutured.  Once the tube was sutured into place, adequate blood supply was confirmed with blanching and refill.  The pedicle was then wrapped with xeroform gauze and dressed appropriately with a telfa and gauze bandage to ensure continued blood supply and protect the attached pedicle.
Posterior Auricular Interpolation Flap Text: A decision was made to reconstruct the defect utilizing an interpolation axial flap and a staged reconstruction.  A telfa template was made of the defect.  This telfa template was then used to outline the posterior auricular interpolation flap.  The donor area for the pedicle flap was then injected with anesthesia.  The flap was excised through the skin and subcutaneous tissue down to the layer of the underlying musculature.  The pedicle flap was carefully excised within this deep plane to maintain its blood supply.  The edges of the donor site were undermined.   The donor site was closed in a primary fashion.  The pedicle was then rotated into position and sutured.  Once the tube was sutured into place, adequate blood supply was confirmed with blanching and refill.  The pedicle was then wrapped with xeroform gauze and dressed appropriately with a telfa and gauze bandage to ensure continued blood supply and protect the attached pedicle.
Paramedian Forehead Flap Text: A decision was made to reconstruct the defect utilizing an interpolation axial flap and a staged reconstruction.  A telfa template was made of the defect.  This telfa template was then used to outline the paramedian forehead pedicle flap.  The donor area for the pedicle flap was then injected with anesthesia.  The flap was excised through the skin and subcutaneous tissue down to the layer of the underlying musculature.  The pedicle flap was carefully excised within this deep plane to maintain its blood supply.  The edges of the donor site were undermined.   The donor site was closed in a primary fashion.  The pedicle was then rotated into position and sutured.  Once the tube was sutured into place, adequate blood supply was confirmed with blanching and refill.  The pedicle was then wrapped with xeroform gauze and dressed appropriately with a telfa and gauze bandage to ensure continued blood supply and protect the attached pedicle.
Abbe Flap (Upper To Lower Lip) Text: The defect of the lower lip was assessed and measured.  Given the location and size of the defect, an Abbe flap was deemed most appropriate.  Using a sterile surgical marker, an appropriate Abbe flap was measured and drawn on the upper lip. Local anesthesia was then infiltrated.  A scalpel was then used to incise the upper lip through and through the skin, vermilion, muscle and mucosa, leaving the flap pedicled on the opposite side.  The flap was then rotated and transferred to the lower lip defect.  The flap was then sutured into place with a three layer technique, closing the orbicularis oris muscle layer with subcutaneous buried sutures, followed by a mucosal layer and an epidermal layer.
Abbe Flap (Lower To Upper Lip) Text: The defect of the upper lip was assessed and measured.  Given the location and size of the defect, an Abbe flap was deemed most appropriate.  Using a sterile surgical marker, an appropriate Abbe flap was measured and drawn on the lower lip. Local anesthesia was then infiltrated. A scalpel was then used to incise the upper lip through and through the skin, vermilion, muscle and mucosa, leaving the flap pedicled on the opposite side.  The flap was then rotated and transferred to the lower lip defect.  The flap was then sutured into place with a three layer technique, closing the orbicularis oris muscle layer with subcutaneous buried sutures, followed by a mucosal layer and an epidermal layer.
Estlander Flap (Upper To Lower Lip) Text: The defect of the lower lip was assessed and measured.  Given the location and size of the defect, an Estlander flap was deemed most appropriate.  Using a sterile surgical marker, an appropriate Estlander flap was measured and drawn on the upper lip. Local anesthesia was then infiltrated. A scalpel was then used to incise the lateral aspect of the flap, through skin, muscle and mucosa, leaving the flap pedicled medially.  The flap was then rotated and positioned to fill the lower lip defect.  The flap was then sutured into place with a three layer technique, closing the orbicularis oris muscle layer with subcutaneous buried sutures, followed by a mucosal layer and an epidermal layer.
Cheiloplasty (Less Than 50%) Text: A decision was made to reconstruct the defect with a  cheiloplasty.  The defect was undermined extensively.  Additional orbicularis oris muscle was excised with a 15 blade scalpel.  The defect was converted into a full thickness wedge, of less than 50% of the vertical height of the lip, to facilite a better cosmetic result.  Small vessels were then tied off with 5-0 monocyrl. The orbicularis oris, superficial fascia, adipose and dermis were then reapproximated.  After the deeper layers were approximated the epidermis was reapproximated with particular care given to realign the vermilion border.
Cheiloplasty (Complex) Text: A decision was made to reconstruct the defect with a  cheiloplasty.  The defect was undermined extensively.  Additional orbicularis oris muscle was excised with a 15 blade scalpel.  The defect was converted into a full thickness wedge to facilite a better cosmetic result.  Small vessels were then tied off with 5-0 monocyrl. The orbicularis oris, superficial fascia, adipose and dermis were then reapproximated.  After the deeper layers were approximated the epidermis was reapproximated with particular care given to realign the vermilion border.
Ear Wedge Repair Text: A wedge excision was completed by carrying down an excision through the full thickness of the ear and cartilage with an inward facing Burow's triangle. The wound was then closed in a layered fashion.
Full Thickness Lip Wedge Repair (Flap) Text: Given the location of the defect and the proximity to free margins a full thickness wedge repair was deemed most appropriate.  Using a sterile surgical marker, the appropriate repair was drawn incorporating the defect and placing the expected incisions perpendicular to the vermilion border.  The vermilion border was also meticulously outlined to ensure appropriate reapproximation during the repair.  The area thus outlined was incised through and through with a #15 scalpel blade.  The muscularis and dermis were reaproximated with deep sutures following hemostasis. Care was taken to realign the vermilion border before proceeding with the superficial closure.  Once the vermilion was realigned the superfical and mucosal closure was finished.
Ftsg Text: Given the location of the defect, shape of the defect and the proximity to free margins a full thickness skin graft was deemed most appropriate. The risks, benefits, and possible outcomes of this procedure were discussed along with the risks, benefits, and possible outcomes of other options for wound repair (including but not limited to: variations of complex closure, flaps, other types of grafts, and second intention). The patient verbalized understanding and consent to the outlined procedure. The patient understands should the surgical site heal in a manner they find unsatisfactory further interventions or referral to an additional specialist may be required. The graft donor and recipient site were prepped and draped in sterile fashion. Anesthesia was checked and supplemented as necessary. The primary defect shape was templated with sterile suture packing and transferred to the donor site. The area thus outlined was incised, along with planned standing tissue cones, deep to adipose tissue with a #15 scalpel blade.  The harvested graft was then trimmed of adipose tissue until only dermis and epidermis was left.  The skin margins of the secondary defect were undermined to an appropriate distance in all directions utilizing fine tipped surgical scissors. Hemostasis was achieved and then the secondary defect was sutured with interrupted buried subcutaneous sutures. The skin graft was then placed in the primary defect and oriented appropriately. The graft was sutured into place with simple interrupted and simple running cutaneous sutures.
Split-Thickness Skin Graft Text: The defect edges were debeveled with a #15 scalpel blade.  Given the location of the defect, shape of the defect and the proximity to free margins a split thickness skin graft was deemed most appropriate.  Using a sterile surgical marker, the primary defect shape was transferred to the donor site. The split thickness graft was then harvested.  The skin graft was then placed in the primary defect and oriented appropriately.
Pinch Graft Text: The defect edges were debeveled with a #15 scalpel blade. Given the location of the defect, shape of the defect and the proximity to free margins a pinch graft was deemed most appropriate. Using a sterile surgical marker, the primary defect shape was transferred to the donor site. The area thus outlined was incised deep to adipose tissue with a #15 scalpel blade.  The harvested graft was then trimmed of adipose tissue until only dermis and epidermis was left. The skin margins of the secondary defect were undermined to an appropriate distance in all directions utilizing iris scissors.  The secondary defect was closed with interrupted buried subcutaneous sutures.  The skin edges were then re-apposed with running  sutures.  The skin graft was then placed in the primary defect and oriented appropriately.
Burow's Graft Text: Given the location of the defect, shape of the defect, the proximity to free margins and the presence of a standing cone deformity a Burow's skin graft was deemed most appropriate. The risks, benefits, and possible outcomes of this procedure were discussed along with the risks, benefits, and possible outcomes of other options for wound repair (including but not limited to: variations of complex closure, flaps, other types of grafts, and second intention). The patient verbalized understanding and consent to the outlined procedure. The patient understands should the surgical site heal in a manner they find unsatisfactory further interventions or referral to an additional specialist may be required. The graft donor and recipient site were prepped and draped in sterile fashion. Anesthesia was checked and supplemented as necessary. The standing cone was removed and this tissue was then trimmed to the shape of the primary defect. The adipose tissue was also removed until only dermis and epidermis were left.  The skin margins of the secondary defect were undermined to an appropriate distance in all directions utilizing iris scissors.  The secondary defect was closed with interrupted buried subcutaneous sutures.  The skin graft was then placed in the primary defect and oriented appropriately. The graft was sutured into place with simple interrupted and simple running cutaneous sutures.
Cartilage Graft Text: The defect edges were debeveled with a #15 scalpel blade.  Given the location of the defect, shape of the defect, the fact the defect involved a full thickness cartilage defect a cartilage graft was deemed most appropriate.  An appropriate donor site was identified, cleansed, and anesthetized. The cartilage graft was then harvested and transferred to the recipient site, oriented appropriately and then sutured into place.  The secondary defect was then repaired using a primary closure.
Composite Graft Text: The defect edges were debeveled with a #15 scalpel blade.  Given the location of the defect, shape of the defect, the proximity to free margins and the fact the defect was full thickness a composite graft was deemed most appropriate.  The defect was outline and then transferred to the donor site.  A full thickness graft was then excised from the donor site. The graft was then placed in the primary defect, oriented appropriately and then sutured into place.  The secondary defect was then repaired using a primary closure.
Epidermal Autograft Text: The defect edges were debeveled with a #15 scalpel blade.  Given the location of the defect, shape of the defect and the proximity to free margins an epidermal autograft was deemed most appropriate.  Using a sterile surgical marker, the primary defect shape was transferred to the donor site. The epidermal graft was then harvested.  The skin graft was then placed in the primary defect and oriented appropriately.
Dermal Autograft Text: The defect edges were debeveled with a #15 scalpel blade.  Given the location of the defect, shape of the defect and the proximity to free margins a dermal autograft was deemed most appropriate.  Using a sterile surgical marker, the primary defect shape was transferred to the donor site. The area thus outlined was incised deep to adipose tissue with a #15 scalpel blade.  The harvested graft was then trimmed of adipose and epidermal tissue until only dermis was left.  The skin graft was then placed in the primary defect and oriented appropriately.
Skin Substitute Text: The defect edges were debeveled with a #15 scalpel blade.  Given the location of the defect, shape of the defect and the proximity to free margins a skin substitute graft was deemed most appropriate.  The graft material was trimmed to fit the size of the defect. The graft was then placed in the primary defect and oriented appropriately.
Tissue Cultured Epidermal Autograft Text: The defect edges were debeveled with a #15 scalpel blade.  Given the location of the defect, shape of the defect and the proximity to free margins a tissue cultured epidermal autograft was deemed most appropriate.  The graft was then trimmed to fit the size of the defect.  The graft was then placed in the primary defect and oriented appropriately.
Xenograft Text: The defect edges were debeveled with a #15 scalpel blade.  Given the location of the defect, shape of the defect and the proximity to free margins a xenograft was deemed most appropriate.  The graft was then trimmed to fit the size of the defect.  The graft was then placed in the primary defect and oriented appropriately.
Purse String (Simple) Text: Given the location of the defect and the characteristics of the surrounding skin a purse string closure was deemed most appropriate.  Undermining was performed circumferentially around the surgical defect.  A purse string suture was then placed and tightened.
Purse String (Intermediate) Text: Given the location of the defect and the characteristics of the surrounding skin a purse string intermediate closure was deemed most appropriate.  Undermining was performed circumferentially around the surgical defect.  A purse string suture was then placed and tightened.
Partial Purse String (Simple) Text: Given the location of the defect and the characteristics of the surrounding skin a simple purse string closure was deemed most appropriate.  Undermining was performed circumferentially around the surgical defect.  A purse string suture was then placed and tightened. Wound tension only allowed a partial closure of the circular defect.
Partial Purse String (Intermediate) Text: Given the location of the defect and the characteristics of the surrounding skin an intermediate purse string closure was deemed most appropriate.  Undermining was performed circumferentially around the surgical defect.  A purse string suture was then placed and tightened. Wound tension only allowed a partial closure of the circular defect.
Localized Dermabrasion With Wire Brush Text: The patient was draped in routine manner.  Localized dermabrasion using 3 x 17 mm wire brush was performed in routine manner to papillary dermis. This spot dermabrasion is being performed to complete skin cancer reconstruction. It also will eliminate the other sun damaged precancerous cells that are known to be part of the regional effect of a lifetime's worth of sun exposure. This localized dermabrasion is therapeutic and should not be considered cosmetic in any regard.
Tarsorrhaphy Text: A tarsorrhaphy was performed using Frost sutures.
Intermediate Repair And Flap Additional Text (Will Appearing After The Standard Complex Repair Text): The intermediate repair was not sufficient to completely close the primary defect. The remaining additional defect was repaired with the flap mentioned below.
Intermediate Repair And Graft Additional Text (Will Appearing After The Standard Complex Repair Text): The intermediate repair was not sufficient to completely close the primary defect. The remaining additional defect was repaired with the graft mentioned below.
Complex Repair And Flap Additional Text (Will Appearing After The Standard Complex Repair Text): The complex repair was not sufficient to completely close the primary defect. The remaining additional defect was repaired with the flap mentioned below.
Complex Repair And Graft Additional Text (Will Appearing After The Standard Complex Repair Text): The complex repair was not sufficient to completely close the primary defect. The remaining additional defect was repaired with the graft mentioned below.
Eyelid Full Thickness Repair - 65066: The eyelid defect was full thickness which required a wedge repair of the eyelid. Special care was taken to ensure that the eyelid margin was realligned when placing sutures.
Manual Repair Warning Statement: We plan on removing the manually selected variable below in favor of our much easier automatic structured text blocks found in the previous tab. We decided to do this to help make the flow better and give you the full power of structured data. Manual selection is never going to be ideal in our platform and I would encourage you to avoid using manual selection from this point on, especially since I will be sunsetting this feature. It is important that you do one of two things with the customized text below. First, you can save all of the text in a word file so you can have it for future reference. Second, transfer the text to the appropriate area in the Library tab. Lastly, if there is a flap or graft type which we do not have you need to let us know right away so I can add it in before the variable is hidden. No need to panic, we plan to give you roughly 6 months to make the change.
Same Histology In Subsequent Stages Text: The pattern and morphology of the tumor is as described in the first stage.
No Residual Tumor Seen Histology Text: There were no malignant cells seen in the sections examined.
Inflammation Suggestive Of Cancer Camouflage Histology Text: There was a dense lymphocytic infiltrate which prevented adequate histologic evaluation of adjacent structures.
Incidental Superficial Basal Cell Carcinoma Histology Text: One or more foci of “budding” basaloid tumor with peripheral palisading is attached superficially to the epidermis and/or adjacent superficial hair follicle(s) and is surrounded by a myxoid stroma.
Bcc Histology Text: There were numerous aggregates of basaloid cells.
Bcc Infiltrative Histology Text: Thin cords of basaloid tumor with angulated ends/spiky islands composed of few to many basaloid keratinocytes, embedded in a myxoid to sclerotic stroma. Retraction of the tumor islands away from the surrounding stroma is variable.
Bcc Micronodular Histology Text: Multiple small aggregates of basaloid cells are present within the dermis. These small nodules display variable subtle peripheral palisading and retraction artifact.
Bcc  Morpheaform/Sclerosing Histology Text: Thin cords of basaloid cells surrounded by a sclerotic collagenous stroma, with mostly absent peripheral palisading and stromal cleft formation.
Bcc  Nodular Histology Text: Discrete nests/nodules of malignant basaloid tumor is present within the dermis and/or attached to the epidermis. The tumor demonstrates palisading and is retracted away from a surrounding mucoid stroma.
Bcc  Nodulocystic Histology Text: Discrete nests/nodules of malignant basaloid tumor is present within the dermis and/or attached to the epidermis. The tumor demonstrates palisading and is retracted away from a surrounding mucoid stroma. Some areas of tumor show cystic degeneration.
Bcc Pigmented Histology Text: Discrete nests/nodules of malignant basaloid tumor is present within the dermis and/or attached to the epidermis. The tumor demonstrates palisading and is retracted away from a surrounding mucoid stroma. Some areas of tumor show deposition of melanin.
Bcc Superficial Histology Text: Multiple foci of “budding” basaloid tumor with peripheral palisading is attached superficially to the epidermis and/or adjacent superficial hair follicle(s) and is surrounded by a myxoid stroma.
Mixed Superficial And Nodular Bcc Histology Text: Histologic features of both superficial (Multiple foci of “budding” basaloid tumor with peripheral palisading is attached superficially to the epidermis and/or adjacent superficial hair follicle(s) and is surrounded by a myxoid stroma) AND nodular (Discrete nests/nodules of malignant basaloid tumor is present within the dermis and/or attached to the epidermis. The tumor demonstrates palisading and is retracted away from a surrounding mucoid stroma) basal cell carcinoma are present.
Mixed Nodular And Infiltrative Bcc Histology Text: Histologic features of both nodular (Discrete nests/nodules of malignant basaloid tumor is present within the dermis and/or attached to the epidermis. The tumor demonstrates palisading and is retracted away from a surrounding mucoid stroma) and infiltrative (Thin cords of basaloid tumor with angulated ends/spiky islands composed of few to many basaloid keratinocytes, embedded in a myxoid to sclerotic stroma. Retraction of the tumor islands away from the surrounding stroma is variable) basal cell carcinoma are present.
Mixed Nodular And Micronodular Bcc Histology Text: Histologic features of both nodular (Discrete nests/nodules of malignant basaloid tumor is present within the dermis and/or attached to the epidermis. The tumor demonstrates palisading and is retracted away from a surrounding mucoid stroma) and micronodular (Multiple small aggregates of basaloid cells are present within the dermis. These small nodules display variable subtle peripheral palisading and retraction artifact) basal cell carcinoma are present.
Metatypical Bcc Histology Text: areas of basaloid tumor with variable eosinophilic features and occasional apoptotic cells are visualized in the dermis. Peripheral palisading and stromal retraction is rare to absent.
Scc Histology Text: Full thickness keratinocyte atypia is observed microscopically. Focal dyskeratosis and apoptosis of keratinocytes is observed. The epidermis is acanthotic with growth of atypical keratinocytes beyond the level of the sebaceous glands.
Scc Moderately Differentiated Histology Text: Full thickness keratinocyte atypia is observed microscopically. Focal dyskeratosis and apoptosis of keratinocytes is observed. The epidermis is acanthotic with growth of atypical keratinocytes beyond the level of the sebaceous glands, many groups of keratinocytes display abnormal or absent keratin formation.
Scc Poorly Differentiated Histology Text: Full thickness keratinocyte atypia is observed microscopically. Focal dyskeratosis and apoptosis of keratinocytes is observed. The epidermis is acanthotic with growth of atypical keratinocytes beyond the level of the sebaceous glands, most keratinocytes show little to no keratinization.
Scc In Situ Histology Text: full thickness keratinocyte atypia is present within the epidermis with overlying loss of the granular cell layer and overlying parakeratosis.
Scc In Situ With Follicular Extension Histology Text: full thickness keratinocyte atypia is present within the epidermis with overlying loss of the granular cell layer and overlying parakeratosis. Adjacent follicular units are notably involved.
Mart-1 - Positive Histology Text: MART-1 staining demonstrates areas of higher density and clustering of melanocytes with Pagetoid spread upwards within the epidermis. The surgical margins are positive for tumor cells.
Mart-1 - Negative Histology Text: MART-1 staining demonstrates a normal density and pattern of melanocytes along the dermal-epidermal junction. The surgical margins are negative for tumor cells.
Information: Selecting Yes will display possible errors in your note based on the variables you have selected. This validation is only offered as a suggestion for you. PLEASE NOTE THAT THE VALIDATION TEXT WILL BE REMOVED WHEN YOU FINALIZE YOUR NOTE. IF YOU WANT TO FAX A PRELIMINARY NOTE YOU WILL NEED TO TOGGLE THIS TO 'NO' IF YOU DO NOT WANT IT IN YOUR FAXED NOTE.

## 2024-01-16 ENCOUNTER — APPOINTMENT (OUTPATIENT)
Dept: URBAN - METROPOLITAN AREA SURGERY 18 | Age: 79
Setting detail: DERMATOLOGY
End: 2024-01-22

## 2024-01-16 VITALS — SYSTOLIC BLOOD PRESSURE: 111 MMHG | HEART RATE: 72 BPM | DIASTOLIC BLOOD PRESSURE: 66 MMHG

## 2024-01-16 DIAGNOSIS — Z48.817 ENCOUNTER FOR SURGICAL AFTERCARE FOLLOWING SURGERY ON THE SKIN AND SUBCUTANEOUS TISSUE: ICD-10-CM

## 2024-01-16 PROBLEM — D04.39 CARCINOMA IN SITU OF SKIN OF OTHER PARTS OF FACE: Status: ACTIVE | Noted: 2024-01-16

## 2024-01-16 PROCEDURE — 99024 POSTOP FOLLOW-UP VISIT: CPT

## 2024-01-16 PROCEDURE — OTHER MOHS SURGERY: OTHER

## 2024-01-16 PROCEDURE — OTHER COUNSELING: OTHER

## 2024-01-16 PROCEDURE — OTHER POST-OP WOUND CHECK: OTHER

## 2024-01-16 PROCEDURE — 17311 MOHS 1 STAGE H/N/HF/G: CPT | Mod: 79

## 2024-01-16 PROCEDURE — OTHER MIPS QUALITY: OTHER

## 2024-01-16 PROCEDURE — 17312 MOHS ADDL STAGE: CPT | Mod: 79

## 2024-01-16 PROCEDURE — 14041 TIS TRNFR F/C/C/M/N/A/G/H/F: CPT | Mod: 79

## 2024-01-16 ASSESSMENT — LOCATION SIMPLE DESCRIPTION DERM: LOCATION SIMPLE: RIGHT MIDDLE FINGER

## 2024-01-16 ASSESSMENT — LOCATION ZONE DERM: LOCATION ZONE: FINGER

## 2024-01-16 ASSESSMENT — LOCATION DETAILED DESCRIPTION DERM: LOCATION DETAILED: RIGHT PROXIMAL DORSAL MIDDLE FINGER

## 2024-01-16 NOTE — PROCEDURE: MIPS QUALITY
Quality 111:Pneumonia Vaccination Status For Older Adults: Patient did not receive any pneumococcal conjugate or polysaccharide vaccine on or after their 60th birthday and before the end of the measurement period
Quality 226: Preventive Care And Screening: Tobacco Use: Screening And Cessation Intervention: Patient screened for tobacco use and is an ex/non-smoker
Detail Level: Detailed
Quality 47: Advance Care Plan: Advance Care Planning discussed and documented; advance care plan or surrogate decision maker documented in the medical record.
Quality 110: Preventive Care And Screening: Influenza Immunization: Influenza Immunization not Administered because Patient Refused.
Quality 358: Patient-Centered Surgical Risk Assessment And Communication: Documentation of patient-specific risk assessment with a risk calculator based on multi-institutional clinical data, the specific risk calculator used, and communication of risk assessment from risk calculator with the patient or family.

## 2024-01-16 NOTE — PROCEDURE: MOHS SURGERY
Mohs Case Number: 
Date Of Previous Biopsy (Optional): 01/03/24
Previous Accession (Optional): [id number]
Biopsy Photograph Reviewed: Yes
Referring Physician (Optional): Valerie Mora MD
Consent Type: Consent (Temporal Branch Facial Nerve)
Eye Shield Used: No
Surgeon Performing Repair (Optional): Dr. Edmar Mora
Initial Size Of Lesion: 2.3
X Size Of Lesion In Cm (Optional): 2
Number Of Stages: 4
Primary Defect Length In Cm (Final Defect Size - Required For Flaps/Grafts): 5.1
Primary Defect Width In Cm (Final Defect Size - Required For Flaps/Grafts): 3.4
Repair Type: Flap
Which Eyelid Repair Cpt Are You Using?: 02317
Oculoplastic Surgeon Procedure Text (A): After obtaining clear surgical margins the patient was sent to oculoplastics for surgical repair.  The patient understands they will receive post-surgical care and follow-up from the referring physician's office.
Otolaryngologist Procedure Text (A): After obtaining clear surgical margins the patient was sent to otolaryngology for surgical repair.  The patient understands they will receive post-surgical care and follow-up from the referring physician's office.
Plastic Surgeon Procedure Text (A): After obtaining clear surgical margins the patient was sent to plastics for surgical repair.  The patient understands they will receive post-surgical care and follow-up from the referring physician's office.
Mid-Level Procedure Text (A): After obtaining clear surgical margins the patient was sent to a mid-level provider for surgical repair.  The patient understands they will receive post-surgical care and follow-up from the mid-level provider.
Provider Procedure Text (A): After obtaining clear surgical margins the defect was repaired by another provider.
Asc Procedure Text (A): After obtaining clear surgical margins the patient was sent to an ASC for surgical repair.  The patient understands they will receive post-surgical care and follow-up from the ASC physician.
Simple / Intermediate / Complex Repair - Final Wound Length In Cm: 0
Suturegard Retention Suture: 2-0 Nylon
Retention Suture Bite Size: 3 mm
Length To Time In Minutes Device Was In Place: 10
Number Of Hemigard Strips Per Side: 1
Undermining Type: Entire Wound
Debridement Text: The wound edges were debrided prior to proceeding with the closure to facilitate wound healing.
Helical Rim Text: The closure involved the helical rim.
Vermilion Border Text: The closure involved the vermilion border.
Nostril Rim Text: The closure involved the nostril rim.
Retention Suture Text: Retention sutures were placed to support the closure and prevent dehiscence.
Flap Type: Mercedes Flap
Secondary Defect Length In Cm (Required For Flaps): 3
Secondary Defect Width In Cm (Required For Flaps): 1.5
Location Indication Override (Is Already Calculated Based On Selected Body Location): Area H
Area H Indication Text: Tumors in this location are included in Area H (eyelids, eyebrows, nose, lips, chin, ear, pre-auricular, post-auricular, temple, genitalia, hands, feet, ankles and areola).  Tissue conservation is critical in these anatomic locations.
Area M Indication Text: Tumors in this location are included in Area M (cheek, forehead, scalp, neck, jawline and pretibial skin).  Mohs surgery is indicated for tumors in these anatomic locations.
Area L Indication Text: Tumors in this location are included in Area L (trunk and extremities).  Mohs surgery is indicated for larger tumors, or tumors with aggressive histologic features, in these anatomic locations.
Tumor Debulked?: scalpel
Depth Of Tumor Invasion (For Histology): dermis
Perineural Invasion (For Histology - Be Specific If Possible): absent
Special Stains Stage 1 - Results: Base On Clearance Noted Above
Histology Selection Override (Optional- Will Default To Parent Diagnosis If N/A): Squamous Cell Carcinoma
Stage 2: Number Of Blocks?: 5
Stage 2: Additional Anesthesia Type: 1% lidocaine with 1:100,000 epinephrine and 408mcg clindamycin/ml and a 1:10 solution of 8.4% sodium bicarbonate
Staging Info: By selecting yes to the question above you will include information on AJCC 8 tumor staging in your Mohs note. Information on tumor staging will be automatically added for SCCs on the head and neck. AJCC 8 includes tumor size, tumor depth, perineural involvement and bone invasion.
Tumor Depth: Less than 6mm from granular layer and no invasion beyond the subcutaneous fat
Was The Patient On Physician Recommended Anticoagulation Therapy?: Please Select the Appropriate Response
Medical Necessity Statement: Based on my medical judgement, Mohs surgery is the most appropriate treatment for this cancer compared to other treatments.
Alternatives Discussed Intro (Do Not Add Period): I discussed alternative treatments to Mohs surgery and specifically discussed the risks and benefits of
Consent 1/Introductory Paragraph: The rationale for Mohs was explained to the patient and consent was obtained. The risks, benefits and alternatives to therapy were discussed in detail. Specifically, the risks of infection, scarring, bleeding, prolonged wound healing, incomplete removal, allergy to anesthesia, nerve injury and recurrence were addressed. Prior to the procedure, the treatment site was clearly identified and confirmed by the patient. All components of Universal Protocol/PAUSE Rule completed.
Consent 2/Introductory Paragraph: Mohs surgery was explained to the patient and consent was obtained. The risks, benefits and alternatives to therapy were discussed in detail. Specifically, the risks of infection, scarring, bleeding, prolonged wound healing, incomplete removal, allergy to anesthesia, nerve injury and recurrence were addressed. Prior to the procedure, the treatment site was clearly identified and confirmed by the patient. All components of Universal Protocol/PAUSE Rule completed.
Consent 3/Introductory Paragraph: I gave the patient a chance to ask questions they had about the procedure.  Following this I explained the Mohs procedure and consent was obtained. The risks, benefits and alternatives to therapy were discussed in detail. Specifically, the risks of infection, scarring, bleeding, prolonged wound healing, incomplete removal, allergy to anesthesia, nerve injury and recurrence were addressed. Prior to the procedure, the treatment site was clearly identified and confirmed by the patient. All components of Universal Protocol/PAUSE Rule completed.
Consent (Temporal Branch)/Introductory Paragraph: The rationale for Mohs was explained to the patient and consent was obtained. The risks, benefits and alternatives to therapy were discussed in detail. Specifically, the risks of damage to the temporal branch of the facial nerve, infection, scarring, bleeding, prolonged wound healing, incomplete removal, allergy to anesthesia, and recurrence were addressed. Prior to the procedure, the treatment site was clearly identified and confirmed by the patient. All components of Universal Protocol/PAUSE Rule completed.
Consent (Marginal Mandibular)/Introductory Paragraph: The rationale for Mohs was explained to the patient and consent was obtained. The risks, benefits and alternatives to therapy were discussed in detail. Specifically, the risks of damage to the marginal mandibular branch of the facial nerve, infection, scarring, bleeding, prolonged wound healing, incomplete removal, allergy to anesthesia, and recurrence were addressed. Prior to the procedure, the treatment site was clearly identified and confirmed by the patient. All components of Universal Protocol/PAUSE Rule completed.
Consent (Spinal Accessory)/Introductory Paragraph: The rationale for Mohs was explained to the patient and consent was obtained. The risks, benefits and alternatives to therapy were discussed in detail. Specifically, the risks of damage to the spinal accessory nerve, infection, scarring, bleeding, prolonged wound healing, incomplete removal, allergy to anesthesia, and recurrence were addressed. Prior to the procedure, the treatment site was clearly identified and confirmed by the patient. All components of Universal Protocol/PAUSE Rule completed.
Consent (Near Eyelid Margin)/Introductory Paragraph: The rationale for Mohs was explained to the patient and consent was obtained. The risks, benefits and alternatives to therapy were discussed in detail. Specifically, the risks of ectropion or eyelid deformity, infection, scarring, bleeding, prolonged wound healing, incomplete removal, allergy to anesthesia, nerve injury and recurrence were addressed. Prior to the procedure, the treatment site was clearly identified and confirmed by the patient. All components of Universal Protocol/PAUSE Rule completed.
Consent (Ear)/Introductory Paragraph: The rationale for Mohs was explained to the patient and consent was obtained. The risks, benefits and alternatives to therapy were discussed in detail. Specifically, the risks of ear deformity, infection, scarring, bleeding, prolonged wound healing, incomplete removal, allergy to anesthesia, nerve injury and recurrence were addressed. Prior to the procedure, the treatment site was clearly identified and confirmed by the patient. All components of Universal Protocol/PAUSE Rule completed.
Consent (Nose)/Introductory Paragraph: The rationale for Mohs was explained to the patient and consent was obtained. The risks, benefits and alternatives to therapy were discussed in detail. Specifically, the risks of nasal deformity, changes in the flow of air through the nose, infection, scarring, bleeding, prolonged wound healing, incomplete removal, allergy to anesthesia, nerve injury and recurrence were addressed. Prior to the procedure, the treatment site was clearly identified and confirmed by the patient. All components of Universal Protocol/PAUSE Rule completed.
Consent (Lip)/Introductory Paragraph: The rationale for Mohs was explained to the patient and consent was obtained. The risks, benefits and alternatives to therapy were discussed in detail. Specifically, the risks of lip deformity, changes in the oral aperture, infection, scarring, bleeding, prolonged wound healing, incomplete removal, allergy to anesthesia, nerve injury and recurrence were addressed. Prior to the procedure, the treatment site was clearly identified and confirmed by the patient. All components of Universal Protocol/PAUSE Rule completed.
Consent (Scalp)/Introductory Paragraph: The rationale for Mohs was explained to the patient and consent was obtained. The risks, benefits and alternatives to therapy were discussed in detail. Specifically, the risks of changes in hair growth pattern secondary to repair, infection, scarring, bleeding, prolonged wound healing, incomplete removal, allergy to anesthesia, nerve injury and recurrence were addressed. Prior to the procedure, the treatment site was clearly identified and confirmed by the patient. All components of Universal Protocol/PAUSE Rule completed.
Detail Level: Detailed
Postop Diagnosis: same
Anesthesia Volume In Cc: 9
Hemostasis: Electrodesiccation
Estimated Blood Loss (Cc): minimal
Stage 8: Additional Anesthesia Type: 1% lidocaine with epinephrine
Repair Anesthesia Method: local infiltration
Anesthesia Volume In Cc: 12
Brow Lift Text: A midfrontal incision was made medially to the defect to allow access to the tissues just superior to the left eyebrow. Following careful dissection inferiorly in a supraperiosteal plane to the level of the left eyebrow, several 3-0 monocryl sutures were used to resuspend the eyebrow orbicularis oculi muscular unit to the superior frontal bone periosteum. This resulted in an appropriate reapproximation of static eyebrow symmetry and correction of the left brow ptosis.
Deep Sutures: 3-0 PGCL
Additional Deep Sutures: 4-0 PGCL
Epidermal Sutures: 5-0 Plain Gut
Epidermal Closure: running cuticular
Suturegard Intro: Intraoperative tissue expansion was performed, utilizing the SUTUREGARD device, in order to reduce wound tension.
Suturegard Body: The suture ends were repeatedly re-tightened and re-clamped to achieve the desired tissue expansion.
Hemigard Intro: Due to skin fragility and wound tension, it was decided to use HEMIGARD adhesive retention suture devices to permit a linear closure. The skin was cleaned and dried for a 6cm distance away from the wound. Excessive hair, if present, was removed to allow for adhesion.
Hemigard Postcare Instructions: The HEMIGARD strips are to remain completely dry for at least 5-7 days.
Donor Site Anesthesia Type: same as repair anesthesia
Graft Basting Suture (Optional): 5-0 Fast Absorbing Gut
Graft Donor Site Dermal Sutures (Optional): 4-0 Monocryl
Epidermal Closure Graft Donor Site (Optional): simple interrupted
Graft Donor Site Bandage (Optional-Leave Blank If You Don't Want In Note): Vaseline, telfa pad, and hypoallergenic tape were applied to the donor site.
Closure 2 Information: This tab is for additional flaps and grafts, including complex repair and grafts and complex repair and flaps. You can also specify a different location for the additional defect, if the location is the same you do not need to select a new one. We will insert the automated text for the repair you select below just as we do for solitary flaps and grafts. Please note that at this time if you select a location with a different insurance zone you will need to override the ICD10 and CPT if appropriate.
Closure 3 Information: This tab is for additional flaps and grafts above and beyond our usual structured repairs.  Please note if you enter information here it will not currently bill and you will need to add the billing information manually.
Wound Care: Petrolatum
Dressing: gelfoam and pressure dressing with Telfa
Dressing (No Sutures): dry sterile dressing
Wound Check: 14 days
Unna Boot Text: An Unna boot was placed to help immobilize the limb and facilitate more rapid healing.
Home Suture Removal Text: Patient was provided instructions on removing sutures and will remove their sutures at home.  If they have any questions or difficulties they will call the office.
Post-Care Instructions: I reviewed with the patient in detail post-care instructions. Patient is not to engage in any heavy lifting, exercise, or swimming for the next 14 days. Should the patient develop any fevers, chills, bleeding, severe pain patient will contact the office immediately.
Pain Refusal Text: I offered to prescribe pain medication but the patient refused to take this medication.
Mauc Instructions: By selecting yes to the question below the MAUC number will be added into the note.  This will be calculated automatically based on the diagnosis chosen, the size entered, the body zone selected (H,M,L) and the specific indications you chose. You will also have the option to override the Mohs AUC if you disagree with the automatically calculated number and this option is found in the Case Summary tab.
Where Do You Want The Question To Include Opioid Counseling Located?: Case Summary Tab
Eye Protection Verbiage: Before proceeding with the stage, a plastic scleral shield was inserted. The globe was anesthetized with a few drops of 1% lidocaine with 1:100,000 epinephrine. Then, an appropriate sized scleral shield was chosen and coated with lacrilube ointment. The shield was gently inserted and left in place for the duration of each stage. After the stage was completed, the shield was gently removed.
Mohs Method Verbiage: An incision at a 45 degree angle following the standard Mohs approach was done and the specimen was harvested as a microscopic controlled layer.
Surgeon/Pathologist Verbiage (Will Incorporate Name Of Surgeon From Intro If Not Blank): operated in two distinct and integrated capacities as the surgeon and pathologist.
Mohs Histo Method Verbiage: Each section was then chromacoded and processed in the Mohs lab using the Mohs protocol and submitted for frozen section.
Subsequent Stages Histo Method Verbiage: Using a similar technique to that described above, a thin layer of tissue was removed from all areas where tumor was visible on the previous stage.  The tissue was again oriented, mapped, dyed, and processed as above.
Mohs Rapid Report Verbiage: The area of clinically evident tumor was marked with skin marking ink and appropriately hatched.  The initial incision was made following the Mohs approach through the skin.  The specimen was taken to the lab, divided into the necessary number of pieces, chromacoded and processed according to the Mohs protocol.  This was repeated in successive stages until a tumor free defect was achieved.
Complex Repair Preamble Text (Leave Blank If You Do Not Want): The risks, benefits, and possible outcomes of this procedure were discussed along with the risks, benefits, and possible outcomes of other options for wound repair (including but not limited to: variations of complex closure, flaps, grafts, and second intention). The patient verbalized understanding and consent to the outlined procedure. The patient understands should the surgical site heal in a manner they find unsatisfactory further interventions or referral to an additional specialist may be required. Following sterile preparation and drape, two standing cones were removed at opposite ends of the postoperative defect within, or parallel to, the relaxed skin tension lines. The resulting defect was undermined widely and bilaterally in the appropriate surgical plane taking care to preserve local arteries, veins, nerves, and other structures of anatomic importance. Hemostasis was achieved and then the wound was sutured together in layered fashion.
Intermediate Repair Preamble Text (Leave Blank If You Do Not Want): The risks, benefits, and possible outcomes of this procedure were discussed along with the risks, benefits, and possible outcomes of other options for wound repair (including but not limited to: variations of complex closure, flaps, grafts, and second intention). The patient verbalized understanding and consent to the outlined procedure. The patient understands should the surgical site heal in a manner they find unsatisfactory further interventions or referral to an additional specialist may be required. Following sterile preparation and drape, two standing cones were removed at opposite ends of the postoperative defect (as needed) within, or parallel to, the relaxed skin tension lines. The resulting defect was undermined, as necessary, in the appropriate surgical plane taking care to preserve local arteries, veins, nerves, and other structures of anatomic importance. Hemostasis was achieved and then the wound was sutured together in layered fashion.
Crescentic Complex Repair Preamble Text (Leave Blank If You Do Not Want): Extensive wide undermining was performed.
Crescentic Intermediate Repair Preamble Text (Leave Blank If You Do Not Want): Undermining was performed with blunt dissection.
Non-Graft Cartilage Fenestration Text: The cartilage was fenestrated with a 2mm punch biopsy to help facilitate healing.
Graft Cartilage Fenestration Text: The cartilage was fenestrated with a 2mm punch biopsy to help facilitate graft survival and healing.
Secondary Intention Text (Leave Blank If You Do Not Want): Given the location of the defect, depth of the postoperative wound and inherent tension at the surgical site, healing by secondary intention was deemed most appropriate for wound closure. The risks, benefits, and possible outcomes of this procedure were discussed along with the risks, benefits, and possible outcomes of other options for wound repair (including but not limited to: complex closure, flaps, and grafts). The patient verbalized understanding and voiced a preference for secondary intention over reconstruction. The patient understands that not all healing occurs in predictable fashion or within a predictable time period. If the resulting scar is unsatisfactory, affects/distorts a free margin or other adjacent structures, or is slow to heal the patient understands further surgical intervention or even referral to an additional specialist may be required.
No Repair - Repaired With Adjacent Surgical Defect Text (Leave Blank If You Do Not Want): After obtaining clear surgical margins the defect was repaired concurrently with another surgical defect which was in close approximation.
Adjacent Tissue Transfer Text: The defect edges were debeveled with a #15 scalpel blade.  Given the location of the defect and the proximity to free margins an adjacent tissue transfer was deemed most appropriate.  Using a sterile surgical marker, an appropriate flap was drawn incorporating the defect and placing the expected incisions within the relaxed skin tension lines where possible.    The area thus outlined was incised deep to adipose tissue with a #15 scalpel blade.  The skin margins were undermined to an appropriate distance in all directions utilizing iris scissors.
Advancement Flap (Single) Text: Given the location of the defect, inherent tension at the surgical site, and the proximity to free margins an advancement flap was deemed most appropriate for wound reconstruction. The risks, benefits, and possible outcomes of this procedure were discussed along with the risks, benefits, and possible outcomes of other options for wound repair (including but not limited to: complex closure, other flaps, grafts, and second intention). The patient verbalized understanding and consent to the outlined procedure. The patient verbalized understanding that intraoperative conditions may necessitate a change in the outlined procedure resulting in modification of the original surgical plan. This decision may be made at the discretion of the surgeon, and is due to factors subject to change or that are difficult to predict preoperatively. Using a sterile surgical marker, an appropriate advancement flap was drawn incorporating the defect and placing the expected incisions within the relaxed skin tension lines where possible. The surgical site and surrounding skin were prepped and draped in sterile fashion.  Standing cones were removed from opposite ends of the defect within the appropriate surgical plane, repositioning as necessary based upon local tension, proximity to free margins/other anatomic structures, and position of the relaxed skin tension lines. The resulting defect was undermined widely and bilaterally in the appropriate surgical plane taking care to preserve local arteries, veins, nerves, and other structures of anatomic importance. This created a sliding flap capable of advancing across the defect to close the wound under minimal tension. Hemostasis was achieved and then the wound was sutured in layered fashion.
Advancement Flap (Double) Text: Given the location of the defect, inherent tension at the surgical site, and the proximity to free margins a double (bilateral) advancement flap was deemed most appropriate for wound reconstruction. The risks, benefits, and possible outcomes of this procedure were discussed along with the risks, benefits, and possible outcomes of other options for wound repair (including but not limited to: complex closure, other flaps, grafts, and second intention). The patient verbalized understanding and consent to the outlined procedure. The patient verbalized understanding that intraoperative conditions may necessitate a change in the outlined procedure resulting in modification of the original surgical plan. This decision may be made at the discretion of the surgeon, and is due to factors subject to change or that are difficult to predict preoperatively. Using a sterile surgical marker, an appropriate bilateral advancement flap was drawn incorporating the defect and placing the expected incisions within the relaxed skin tension lines where possible. The surgical site and surrounding skin were prepped and draped in sterile fashion.  Standing cones were removed from opposite ends of the defect within the appropriate surgical plane, repositioning as necessary based upon local tension, proximity to free margins/other anatomic structures, and position of the relaxed skin tension lines. The resulting defect was undermined widely and bilaterally in the appropriate surgical plane taking care to preserve local arteries, veins, nerves, and other structures of anatomic importance. This created a sliding flap capable of advancing bilaterally across the defect to close the wound under minimal tension. Hemostasis was achieved and then the wound was sutured in layered fashion.
Burow's Advancement Flap Text: The defect edges were debeveled with a #15 scalpel blade.  Given the location of the defect and the proximity to free margins a Burow's advancement flap was deemed most appropriate.  Using a sterile surgical marker, the appropriate advancement flap was drawn incorporating the defect and placing the expected incisions within the relaxed skin tension lines where possible.    The area thus outlined was incised deep to adipose tissue with a #15 scalpel blade.  The skin margins were undermined to an appropriate distance in all directions utilizing iris scissors.
Chonodrocutaneous Helical Advancement Flap Text: Given the location of the defect, inherent tension at the surgical site, and the proximity to free margins a Burow’s advancement flap was deemed most appropriate for wound reconstruction. The risks, benefits, and possible outcomes of this procedure were discussed along with the risks, benefits, and possible outcomes of other options for wound repair (including but not limited to: complex closure, other flaps, grafts, and second intention). The patient verbalized understanding and consent to the outlined procedure. The patient verbalized understanding that intraoperative conditions may necessitate a change in the outlined procedure resulting in modification of the original surgical plan. This decision may be made at the discretion of the surgeon, and is due to factors subject to change or that are difficult to predict preoperatively. Using a sterile surgical marker, an appropriate Burow’s advancement flap was drawn incorporating the defect and placing the expected incisions within the relaxed skin tension lines where possible. The surgical site and surrounding skin were prepped and draped in sterile fashion.  Standing cones were removed from opposite ends of the defect within the appropriate surgical plane, repositioning as necessary based upon local tension, proximity to free margins/other anatomic structures, and position of the relaxed skin tension lines. The position of one dog ear was modified, moving the standing cone along a lateral plane to lie in a more favorable location for closure.  The resulting defect was undermined widely and bilaterally in the appropriate surgical plane taking care to preserve local arteries, veins, nerves, and other structures of anatomic importance. This created a sliding flap capable of advancing across the defect to close the wound under minimal tension. Hemostasis was achieved and then the wound was sutured in layered fashion.
Crescentic Advancement Flap Text: Given the location of the defect, inherent tension at the surgical site, and the proximity to free margins a Crescentic advancement flap was deemed most appropriate for wound reconstruction. The risks, benefits, and possible outcomes of this procedure were discussed along with the risks, benefits, and possible outcomes of other options for wound repair (including but not limited to: complex closure, other flaps, grafts, and second intention). The patient verbalized understanding and consent to the outlined procedure. The patient verbalized understanding that intraoperative conditions may necessitate a change in the outlined procedure resulting in modification of the original surgical plan. This decision may be made at the discretion of the surgeon, and is due to factors subject to change or that are difficult to predict preoperatively. Using a sterile surgical marker, an appropriate Crescentic advancement flap was drawn incorporating the defect and placing the expected incisions within the relaxed skin tension lines where possible. The surgical site and surrounding skin were prepped and draped in sterile fashion.  Standing cones were removed from opposite ends of the defect within the appropriate surgical plane, repositioning as necessary based upon local tension, proximity to free margins/other anatomic structures, and position of the relaxed skin tension lines. The shape of one standing cone was modified, designed in a crescentic shape to allow for sliding movement in both the horizontal and vertical plane.  The resulting defect was undermined widely and bilaterally in the appropriate surgical plane taking care to preserve local arteries, veins, nerves, and other structures of anatomic importance. This created a sliding flap capable of advancing across the defect to close the wound under minimal tension. Hemostasis was achieved and then the wound was sutured in layered fashion.
A-T Advancement Flap Text: Given the location of the defect, inherent tension at the surgical site, and the proximity to free margins an A to T advancement flap was deemed most appropriate for wound reconstruction. The risks, benefits, and possible outcomes of this procedure were discussed along with the risks, benefits, and possible outcomes of other options for wound repair (including but not limited to: complex closure, other flaps, grafts, and second intention). The patient verbalized understanding and consent to the outlined procedure. The patient verbalized understanding that intraoperative conditions may necessitate a change in the outlined procedure resulting in modification of the original surgical plan. This decision may be made at the discretion of the surgeon, and is due to factors subject to change or that are difficult to predict preoperatively. Using a sterile surgical marker, an appropriate A to T advancement flap was drawn incorporating the defect and placing the expected incisions within the relaxed skin tension lines where possible. The surgical site and surrounding skin were prepped and draped in sterile fashion.  Standing cones were removed from opposite ends of the defect within the appropriate surgical plane, repositioning as necessary based upon local tension, proximity to free margins/other anatomic structures, and position of the relaxed skin tension lines. The shape of one standing cone was modified, taking two smaller standing cones (each approximately half the size of the traditional standing cone) and repositioning them along opposite ends of a lateral plane (with respect to the defect).  The resulting defect was undermined widely and bilaterally in the appropriate surgical plane taking care to preserve local arteries, veins, nerves, and other structures of anatomic importance. This created a sliding flap capable of advancing across the defect to close the wound under minimal tension. Hemostasis was achieved and then the wound was sutured in layered fashion.
O-T Advancement Flap Text: The defect edges were debeveled with a #15 scalpel blade.  Given the location of the defect, shape of the defect and the proximity to free margins an O-T advancement flap was deemed most appropriate.  Using a sterile surgical marker, an appropriate advancement flap was drawn incorporating the defect and placing the expected incisions within the relaxed skin tension lines where possible.    The area thus outlined was incised deep to adipose tissue with a #15 scalpel blade.  The skin margins were undermined to an appropriate distance in all directions utilizing iris scissors.
O-L Flap Text: The defect edges were debeveled with a #15 scalpel blade.  Given the location of the defect, shape of the defect and the proximity to free margins an O-L flap was deemed most appropriate.  Using a sterile surgical marker, an appropriate advancement flap was drawn incorporating the defect and placing the expected incisions within the relaxed skin tension lines where possible.    The area thus outlined was incised deep to adipose tissue with a #15 scalpel blade.  The skin margins were undermined to an appropriate distance in all directions utilizing iris scissors.
O-Z Flap Text: The defect edges were debeveled with a #15 scalpel blade.  Given the location of the defect, shape of the defect and the proximity to free margins an O-Z flap was deemed most appropriate.  Using a sterile surgical marker, an appropriate transposition flap was drawn incorporating the defect and placing the expected incisions within the relaxed skin tension lines where possible. The area thus outlined was incised deep to adipose tissue with a #15 scalpel blade.  The skin margins were undermined to an appropriate distance in all directions utilizing iris scissors.
Double O-Z Flap Text: The defect edges were debeveled with a #15 scalpel blade.  Given the location of the defect, shape of the defect and the proximity to free margins a Double O-Z flap was deemed most appropriate.  Using a sterile surgical marker, an appropriate transposition flap was drawn incorporating the defect and placing the expected incisions within the relaxed skin tension lines where possible. The area thus outlined was incised deep to adipose tissue with a #15 scalpel blade.  The skin margins were undermined to an appropriate distance in all directions utilizing iris scissors.
V-Y Flap Text: Given the location of the defect, inherent tension at the surgical site, and the proximity to free margins an island pedicle advancement flap was deemed most appropriate for wound reconstruction. The risks, benefits, and possible outcomes of this procedure were discussed along with the risks, benefits, and possible outcomes of other options for wound repair (including but not limited to: complex closure, other flaps, grafts, and second intention). The patient verbalized understanding and consent to the outlined procedure. The patient verbalized understanding that intraoperative conditions may necessitate a change in the outlined procedure resulting in modification of the original surgical plan. This decision may be made at the discretion of the surgeon, and is due to factors subject to change or that are difficult to predict preoperatively. \\n\\nUsing a sterile surgical marker, an appropriate island pedicle advancement flap was drawn incorporating the defect and placing the expected incisions within the relaxed skin tension lines where possible. The surgical site and surrounding skin were prepped and draped in sterile fashion.  The island pedicle advancement flap was incised and undermined within the appropriate surgical plane, creating a mobile flap supported by a central vascular pedicle.  The wound edges surrounding the flap and primary defect were undermined widely and bilaterally in the appropriate surgical plane taking care to preserve local arteries, veins, nerves, and other structures of anatomic importance. This created a sliding flap capable of advancing across the defect to close the wound under minimal tension. Hemostasis was achieved and then the wound was sutured in layered fashion.
Advancement-Rotation Flap Text: The defect edges were debeveled with a #15 scalpel blade.  Given the location of the defect, shape of the defect and the proximity to free margins an advancement-rotation flap was deemed most appropriate.  Using a sterile surgical marker, an appropriate flap was drawn incorporating the defect and placing the expected incisions within the relaxed skin tension lines where possible. The area thus outlined was incised deep to adipose tissue with a #15 scalpel blade.  The skin margins were undermined to an appropriate distance in all directions utilizing iris scissors.
Mercedes Flap Text: Given the location of the defect, inherent tension at the surgical site, and the proximity to free margins a Mercedes (Tripolar) Advancement Flap was deemed most appropriate for wound reconstruction. The risks, benefits, and possible outcomes of this procedure were discussed along with the risks, benefits, and possible outcomes of other options for wound repair (including but not limited to: complex closure, other flaps, grafts, and second intention). The patient verbalized understanding and consent to the outlined procedure. The patient verbalized understanding that intraoperative conditions may necessitate a change in the outlined procedure resulting in modification of the original surgical plan. This decision may be made at the discretion of the surgeon, and is due to factors subject to change or that are difficult to predict preoperatively. Using a sterile surgical marker, an appropriate tripolar advancement flap was drawn incorporating the defect and placing the expected incisions within the relaxed skin tension lines where possible. The surgical site and surrounding skin were prepped and draped in sterile fashion.  Anesthesia was checked and supplemented as necessary. Standing cones were removed from three “poles” of the defect within the appropriate surgical plane, repositioning as necessary based upon local tension, proximity to free margins/other anatomic structures, and position of the relaxed skin tension lines. The resulting defect was undermined widely and bilaterally in the appropriate surgical plane taking care to preserve local arteries, veins, nerves, and other structures of anatomic importance. This created a sliding flap capable of advancing across the defect from three reservoirs to close the wound under minimal tension. Hemostasis was achieved and then the wound was sutured in layered fashion.
Modified Advancement Flap Text: Given the location of the defect, inherent tension at the surgical site, and the proximity to free margins a modified advancement flap was deemed most appropriate for wound reconstruction. The risks, benefits, and possible outcomes of this procedure were discussed along with the risks, benefits, and possible outcomes of other options for wound repair (including but not limited to: complex closure, other flaps, grafts, and second intention). The patient verbalized understanding and consent to the outlined procedure. The patient verbalized understanding that intraoperative conditions may necessitate a change in the outlined procedure resulting in modification of the original surgical plan. This decision may be made at the discretion of the surgeon, and is due to factors subject to change or that are difficult to predict preoperatively. Using a sterile surgical marker, an appropriate advancement flap was drawn incorporating the defect and placing the expected incisions within the relaxed skin tension lines where possible.\\n\\nThe surgical site and surrounding skin were prepped and draped in sterile fashion.  Standing cones were removed from opposite ends of the defect within the appropriate surgical plane, repositioning as necessary based upon local tension, proximity to free margins/other anatomic structures, and position of the relaxed skin tension lines. The resulting defect was undermined widely and bilaterally in the appropriate surgical plane taking care to preserve local arteries, veins, nerves, and other structures of anatomic importance. This created a sliding flap capable of advancing across the defect to close the wound under minimal tension. Hemostasis was achieved and then the wound was sutured in layered fashion.
Mucosal Advancement Flap Text: Given the location of the defect, shape of the defect and the proximity to free margins a mucosal advancement flap was deemed most appropriate. Incisions were made with a 15 blade scalpel in the appropriate fashion along the cutaneous vermilion border and the mucosal lip. The remaining actinically damaged mucosal tissue was excised.  The mucosal advancement flap was then elevated to the gingival sulcus with care taken to preserve the neurovascular structures and advanced into the primary defect. Care was taken to ensure that precise realignment of the vermilion border was achieved.
Peng Advancement Flap Text: The defect edges were debeveled with a #15 scalpel blade.  Given the location of the defect, shape of the defect and the proximity to free margins a Peng advancement flap was deemed most appropriate.  Using a sterile surgical marker, an appropriate advancement flap was drawn incorporating the defect and placing the expected incisions within the relaxed skin tension lines where possible. The area thus outlined was incised deep to adipose tissue with a #15 scalpel blade.  The skin margins were undermined to an appropriate distance in all directions utilizing iris scissors.
Hatchet Flap Text: The defect edges were debeveled with a #15 scalpel blade.  Given the location of the defect, shape of the defect and the proximity to free margins a hatchet flap was deemed most appropriate.  Using a sterile surgical marker, an appropriate hatchet flap was drawn incorporating the defect and placing the expected incisions within the relaxed skin tension lines where possible.    The area thus outlined was incised deep to adipose tissue with a #15 scalpel blade.  The skin margins were undermined to an appropriate distance in all directions utilizing iris scissors.
Rotation Flap Text: Given the location of the defect, inherent tension at the surgical site, and the proximity to free margins a rotation flap was deemed most appropriate for wound reconstruction. The risks, benefits, and possible outcomes of this procedure were discussed along with the risks, benefits, and possible outcomes of other options for wound repair (including but not limited to: complex closure, other flaps, grafts, and second intention). The patient verbalized understanding and consent to the outlined procedure. The patient verbalized understanding that intraoperative conditions may necessitate a change in the outlined procedure resulting in modification of the original surgical plan. This decision may be made at the discretion of the surgeon, and is due to factors subject to change or that are difficult to predict preoperatively. Using a sterile surgical marker, an appropriate rotation flap was drawn incorporating the defect and placing the expected incisions within the relaxed skin tension lines where possible. The area thus outlined was incised deep to adipose tissue with a #15 scalpel blade. The resulting defect was undermined widely and bilaterally in the appropriate surgical plane taking care to preserve local arteries, veins, nerves, and other structures of anatomic importance. This created a flap capable of rotating across the defect to close the wound under minimal tension. Hemostasis was obtained and then the flap was sutured together in layered fashion.
Bilateral Rotation Flap Text: The defect edges were debeveled with a #15 scalpel blade. Given the location of the defect, shape of the defect and the proximity to free margins a bilateral rotation flap was deemed most appropriate. Using a sterile surgical marker, an appropriate rotation flap was drawn incorporating the defect and placing the expected incisions within the relaxed skin tension lines where possible. The area thus outlined was incised deep to adipose tissue with a #15 scalpel blade. The skin margins were undermined to an appropriate distance in all directions utilizing iris scissors. Following this, the designed flap was carried over into the primary defect and sutured into place.
Spiral Flap Text: The defect edges were debeveled with a #15 scalpel blade.  Given the location of the defect, shape of the defect and the proximity to free margins a spiral flap was deemed most appropriate.  Using a sterile surgical marker, an appropriate rotation flap was drawn incorporating the defect and placing the expected incisions within the relaxed skin tension lines where possible. The area thus outlined was incised deep to adipose tissue with a #15 scalpel blade.  The skin margins were undermined to an appropriate distance in all directions utilizing iris scissors.
Staged Advancement Flap Text: The defect edges were debeveled with a #15 scalpel blade.  Given the location of the defect, shape of the defect and the proximity to free margins a staged advancement flap was deemed most appropriate.  Using a sterile surgical marker, an appropriate advancement flap was drawn incorporating the defect and placing the expected incisions within the relaxed skin tension lines where possible. The area thus outlined was incised deep to adipose tissue with a #15 scalpel blade.  The skin margins were undermined to an appropriate distance in all directions utilizing iris scissors.
Star Wedge Flap Text: The defect edges were debeveled with a #15 scalpel blade.  Given the location of the defect, shape of the defect and the proximity to free margins a star wedge flap was deemed most appropriate.  Using a sterile surgical marker, an appropriate rotation flap was drawn incorporating the defect and placing the expected incisions within the relaxed skin tension lines where possible. The area thus outlined was incised deep to adipose tissue with a #15 scalpel blade.  The skin margins were undermined to an appropriate distance in all directions utilizing iris scissors.
Transposition Flap Text: The defect edges were debeveled with a #15 scalpel blade.  Given the location of the defect and the proximity to free margins a transposition flap was deemed most appropriate.  Using a sterile surgical marker, an appropriate transposition flap was drawn incorporating the defect.    The area thus outlined was incised deep to adipose tissue with a #15 scalpel blade.  The skin margins were undermined to an appropriate distance in all directions utilizing iris scissors.
Muscle Hinge Flap Text: The defect edges were debeveled with a #15 scalpel blade.  Given the size, depth and location of the defect and the proximity to free margins a muscle hinge flap was deemed most appropriate.  Using a sterile surgical marker, an appropriate hinge flap was drawn incorporating the defect. The area thus outlined was incised with a #15 scalpel blade.  The skin margins were undermined to an appropriate distance in all directions utilizing iris scissors.
Mustarde Flap Text: The defect edges were debeveled with a #15 scalpel blade.  Given the size, depth and location of the defect and the proximity to free margins a Mustarde flap was deemed most appropriate.  Using a sterile surgical marker, an appropriate flap was drawn incorporating the defect. The area thus outlined was incised with a #15 scalpel blade.  The skin margins were undermined to an appropriate distance in all directions utilizing iris scissors.
Nasal Turnover Hinge Flap Text: The defect edges were debeveled with a #15 scalpel blade.  Given the size, depth, location of the defect and the defect being full thickness a nasal turnover hinge flap was deemed most appropriate.  Using a sterile surgical marker, an appropriate hinge flap was drawn incorporating the defect. The area thus outlined was incised with a #15 scalpel blade. The flap was designed to recreate the nasal mucosal lining and the alar rim. The skin margins were undermined to an appropriate distance in all directions utilizing iris scissors.
Nasalis-Muscle-Based Myocutaneous Island Pedicle Flap Text: Using a #15 blade, an incision was made around the donor flap to the level of the nasalis muscle. Wide lateral undermining was then performed in both the subcutaneous plane above the nasalis muscle, and in a submuscular plane just above periosteum. This allowed the formation of a free nasalis muscle axial pedicle (based on the angular artery) which was still attached to the actual cutaneous flap, increasing its mobility and vascular viability. Hemostasis was obtained with pinpoint electrocoagulation. The flap was mobilized into position and the pivotal anchor points positioned and stabilized with buried interrupted sutures. Subcutaneous and dermal tissues were closed in a multilayered fashion with sutures. Tissue redundancies were excised, and the epidermal edges were apposed without significant tension and sutured with sutures.
Orbicularis Oris Muscle Flap Text: The defect edges were debeveled with a #15 scalpel blade.  Given that the defect affected the competency of the oral sphincter an orbicularis oris muscle flap was deemed most appropriate to restore this competency and normal muscle function.  Using a sterile surgical marker, an appropriate flap was drawn incorporating the defect. The area thus outlined was incised with a #15 scalpel blade.
Melolabial Transposition Flap Text: Given the size/depth/location of the defect, inherent tension at the surgical site, and the proximity to free margins, a one stage nasolabial transposition flap was deemed most appropriate for wound reconstruction. The risks, benefits, and possible outcomes of this procedure were discussed along with the risks, benefits, and possible outcomes of other options for wound repair (including but not limited to: complex closure, other flaps, grafts, second intention, and combinations of these procedures). The patient verbalized understanding and consent to the outlined procedure. The patient verbalized understanding that intraoperative conditions may necessitate a change in the outlined procedure resulting in modification of the original surgical plan. This decision may be made at the discretion of the surgeon, and is due to factors subject to change or that are difficult to predict preoperatively. We also discussed complex defects involving multiple cosmetic subunits often necessitate several smaller planned surgical revisions spread out over the course of many months in order to restore appropriate tissue volume, cosmetic landmarks, and diminish scarring. In many cases anatomic “semblance” is possible, but complete parity is not. The patient understands that in some cases referral to additional specialists for additional procedures may be required. \\nUsing a sterile surgical marker, an appropriate nasolabial transposition flap was drawn incorporating the defect and placing the expected incisions within the relaxed skin tension lines where possible. The surgical site and surrounding skin were prepped and draped in sterile fashion. Anesthesia was checked and supplemented as necessary.  A single standing cone was removed along one border of the surgical defect. The flap and second standing cone were incised and raised from the opposite pole as the standing cone in the appropriate surgical plane. The entire flap and area surrounding the removed standing cones was undermined widely and bilaterally in the appropriate surgical plane taking care to preserve local arteries, veins, nerves, and other structures of anatomic importance. The area along the distal nasal portion of the post Mohs defect was undermined just enough to place deep buried vertical mattress sutures. This created a flap capable of transposing into the defect to drape the wound under minimal tension. Hemostasis was achieved. A single anchoring buried vertical mattress suture was placed to advance/transpose the flap into position without distortion of the surrounding free margins. The remaining flap was sutured in layered fashion.
Rhombic Flap Text: Given the location of the defect, inherent tension at the surgical site, and the proximity to free margins a rhombic transposition flap was deemed most appropriate for wound reconstruction. The risks, benefits, and possible outcomes of this procedure were discussed along with the risks, benefits, and possible outcomes of other options for wound repair (including but not limited to: complex closure, other flaps, grafts, and second intention). The patient verbalized understanding and consent to the outlined procedure. The patient verbalized understanding that intraoperative conditions may necessitate a change in the outlined procedure resulting in modification of the original surgical plan. This decision may be made at the discretion of the surgeon, and is due to factors subject to change or that are difficult to predict preoperatively. Using a sterile surgical marker, an appropriate rhombic transposition flap was drawn incorporating the defect and placing the expected incisions within the relaxed skin tension lines where possible. The surgical site and surrounding skin were prepped and draped in sterile fashion.  A single standing cone was removed at the expected pivot point of the transposition flap in the appropriate surgical plane, repositioning as necessary based upon local tension, proximity to free margins/other anatomic structures, and position of the relaxed skin tension lines. The flap (designed approximately 90 degrees from one surgical axis) was incised and raised in the appropriate surgical plane. The resulting defect was undermined widely and bilaterally in the appropriate surgical plane taking care to preserve local arteries, veins, nerves, and other structures of anatomic importance. This created a flap capable of transposing across the defect to close the wound under minimal tension, without distortion of adjacent free margins. Hemostasis was achieved and then the wound was sutured in layered fashion.
Bi-Rhombic Flap Text: The defect edges were debeveled with a #15 scalpel blade.  Given the location of the defect and the proximity to free margins a bi-rhombic flap was deemed most appropriate.  Using a sterile surgical marker, an appropriate rhombic flap was drawn incorporating the defect. The area thus outlined was incised deep to adipose tissue with a #15 scalpel blade.  The skin margins were undermined to an appropriate distance in all directions utilizing iris scissors.
Helical Rim Advancement Flap Text: Given the location of the defect, inherent tension at the surgical site, and the proximity to free margins a helical rim advancement flap was deemed most appropriate for wound reconstruction. The risks, benefits, and possible outcomes of this procedure were discussed along with the risks, benefits, and possible outcomes of other options for wound repair (including but not limited to: complex closure, other flaps, grafts, and second intention). The patient verbalized understanding and consent to the outlined procedure. The patient verbalized understanding that intraoperative conditions may necessitate a change in the outlined procedure resulting in modification of the original surgical plan. This decision may be made at the discretion of the surgeon, and is due to factors subject to change or that are difficult to predict preoperatively. Using a sterile surgical marker, an appropriate helical rim advancement flap was drawn incorporating the defect and placing the expected incisions within the relaxed skin tension lines where possible. The surgical site and surrounding skin were prepped and draped in sterile fashion. Anesthesia was checked and supplemented as necessary. A single standing cone was then excised posterior to the defect. A linear arc-like incision was removed at the anterior aspect of the defect and carried to a point of relative auricular skin laxity. A second dog ear was then removed at the end of this incision. The flap was undermined widely and bilaterally in the appropriate surgical plane. Hemostasis was obtained and then the flap was sutured together in layered fashion.
Bilateral Helical Rim Advancement Flap Text: The defect edges were debeveled with a #15 blade scalpel.  Given the location of the defect and the proximity to free margins (helical rim) a bilateral helical rim advancement flap was deemed most appropriate.  Using a sterile surgical marker, the appropriate advancement flaps were drawn incorporating the defect and placing the expected incisions between the helical rim and antihelix where possible.  The area thus outlined was incised through and through with a #15 scalpel blade.  With a skin hook and iris scissors, the flaps were gently and sharply undermined and freed up.
Ear Star Wedge Flap Text: The defect edges were debeveled with a #15 blade scalpel.  Given the location of the defect and the proximity to free margins (helical rim) an ear star wedge flap was deemed most appropriate.  Using a sterile surgical marker, the appropriate flap was drawn incorporating the defect and placing the expected incisions between the helical rim and antihelix where possible.  The area thus outlined was incised through and through with a #15 scalpel blade.
Banner Transposition Flap Text: The defect edges were debeveled with a #15 scalpel blade.  Given the location of the defect and the proximity to free margins a Banner transposition flap was deemed most appropriate.  Using a sterile surgical marker, an appropriate flap drawn around the defect. The area thus outlined was incised deep to adipose tissue with a #15 scalpel blade.  The skin margins were undermined to an appropriate distance in all directions utilizing iris scissors.
Bilobed Flap Text: Given the location of the defect, inherent tension at the surgical site, and the proximity to free margins a bilobe transposition flap was deemed most appropriate for wound reconstruction. The risks, benefits, and possible outcomes of this procedure were discussed along with the risks, benefits, and possible outcomes of other options for wound repair (including but not limited to: complex closure, other flaps, grafts, and second intention). The patient verbalized understanding and consent to the outlined procedure. The patient verbalized understanding that intraoperative conditions may necessitate a change in the outlined procedure resulting in modification of the original surgical plan. This decision may be made at the discretion of the surgeon, and is due to factors subject to change or that are difficult to predict preoperatively. We also discussed that should the patient heal with a scar they find noticeable/unfavorable additional surgical procedures and/or referral to additional specialists may be required. The patient understands the healing process takes place over many months and that the \"final\" appearance of the scar line often takes a year or longer to achieve.\\n\\nUsing a sterile surgical marker, an appropriate bilobed transposition flap was drawn incorporating the defect and placing the expected incisions within the relaxed skin tension lines where possible. The surgical site and surrounding skin were prepped and draped in sterile fashion.  A single standing cone was removed at the expected pivot point of the transposition flap in the appropriate surgical plane, repositioning as necessary based upon local tension, proximity to free margins/other anatomic structures, and position of the relaxed skin tension lines. The flap (containing two lobes  by 45 degrees, each proportionate in size to the postop defect) was incised and raised in the appropriate surgical plane. The resulting defect was undermined widely and bilaterally in the appropriate surgical plane taking care to preserve local arteries, veins, nerves, and other structures of anatomic importance. This created a flap capable of transposing across the defect to close the wound under minimal tension, without distortion of adjacent free margins. Hemostasis was achieved and then the wound was sutured in layered fashion.
Trilobed Flap Text: Given the location of the defect, inherent tension at the surgical site, and the proximity to free margins a trilobed transposition flap was deemed most appropriate for wound reconstruction. The risks, benefits, and possible outcomes of this procedure were discussed along with the risks, benefits, and possible outcomes of other options for wound repair (including but not limited to: complex closure, other flaps, grafts, and second intention). The patient verbalized understanding and consent to the outlined procedure. The patient verbalized understanding that intraoperative conditions may necessitate a change in the outlined procedure resulting in modification of the original surgical plan. This decision may be made at the discretion of the surgeon, and is due to factors subject to change or that are difficult to predict preoperatively. We also discussed that should the patient heal with a scar they find noticeable/unfavorable additional surgical procedures and/or referral to additional specialists may be required. The patient understands the healing process takes place over many months and that the \"final\" appearance of the scar line often takes a year or longer to achieve.\\nUsing a sterile surgical marker, an appropriate trilobe transposition flap was drawn incorporating the defect and placing the expected incisions within the relaxed skin tension lines where possible. The surgical site and surrounding skin were prepped and draped in sterile fashion.  A single standing cone was removed at the expected pivot point of the transposition flap in the appropriate surgical plane, repositioning as necessary based upon local tension, proximity to free margins/other anatomic structures, and position of the relaxed skin tension lines. The flap (containing three lobes  by 45 degrees, each proportionate in size to the postop defect) was incised and raised in the appropriate surgical plane. The resulting defect was undermined widely and bilaterally in the appropriate surgical plane taking care to preserve local arteries, veins, nerves, and other structures of anatomic importance. This created a flap capable of transposing across the defect to close the wound under minimal tension, without distortion of adjacent free margins. Hemostasis was achieved and then the wound was sutured in layered fashion.
Dorsal Nasal Flap Text: Given the location of the defect, inherent tension at the surgical site, and the proximity to free margins a dorsal nasal flap was deemed most appropriate for wound reconstruction. The risks, benefits, and possible outcomes of this procedure were discussed along with the risks, benefits, and possible outcomes of other options for wound repair (including but not limited to: complex closure, other flaps, grafts, and second intention). The patient verbalized understanding and consent to the outlined procedure. The patient verbalized understanding that intraoperative conditions may necessitate a change in the outlined procedure resulting in modification of the original surgical plan. This decision may be made at the discretion of the surgeon, and is due to factors subject to change or that are difficult to predict preoperatively. We also discussed that should the patient heal with a scar they find noticeable/unfavorable additional surgical procedures and/or referral to additional specialists may be required. The patient understands the healing process takes place over many months and that the \"final\" appearance of the scar line often takes a year or longer to achieve.\\nUsing a sterile surgical marker, an appropriate dorsal nasal flap was drawn incorporating the defect and placing the expected incisions within the relaxed skin tension lines where possible. The surgical site and surrounding skin were prepped and draped in sterile fashion.  The area thus outlined was incised in the appropriate surgical plane with a #15 scalpel blade. The resulting defect was undermined widely and bilaterally in the appropriate surgical plane taking care to preserve local arteries, veins, nerves, and other structures of anatomic importance. This created a flap capable of rotating across the defect to close the wound under minimal tension. Hemostasis was obtained and then the flap was sutured together in layered fashion.
Island Pedicle Flap With Canthal Suspension Text: The defect edges were debeveled with a #15 scalpel blade.  Given the location of the defect, shape of the defect and the proximity to free margins an island pedicle advancement flap was deemed most appropriate.  Using a sterile surgical marker, an appropriate advancement flap was drawn incorporating the defect, outlining the appropriate donor tissue and placing the expected incisions within the relaxed skin tension lines where possible. The area thus outlined was incised deep to adipose tissue with a #15 scalpel blade.  The skin margins were undermined to an appropriate distance in all directions around the primary defect and laterally outward around the island pedicle utilizing iris scissors.  There was minimal undermining beneath the pedicle flap. A suspension suture was placed in the canthal tendon to prevent tension and prevent ectropion.
Alar Island Pedicle Flap Text: The defect edges were debeveled with a #15 scalpel blade.  Given the location of the defect, shape of the defect and the proximity to the alar rim an island pedicle advancement flap was deemed most appropriate.  Using a sterile surgical marker, an appropriate advancement flap was drawn incorporating the defect, outlining the appropriate donor tissue and placing the expected incisions within the nasal ala running parallel to the alar rim. The area thus outlined was incised with a #15 scalpel blade.  The skin margins were undermined minimally to an appropriate distance in all directions around the primary defect and laterally outward around the island pedicle utilizing iris scissors.  There was minimal undermining beneath the pedicle flap.
Double Island Pedicle Flap Text: The defect edges were debeveled with a #15 scalpel blade.  Given the location of the defect, shape of the defect and the proximity to free margins a double island pedicle advancement flap was deemed most appropriate.  Using a sterile surgical marker, an appropriate advancement flap was drawn incorporating the defect, outlining the appropriate donor tissue and placing the expected incisions within the relaxed skin tension lines where possible.    The area thus outlined was incised deep to adipose tissue with a #15 scalpel blade.  The skin margins were undermined to an appropriate distance in all directions around the primary defect and laterally outward around the island pedicle utilizing iris scissors.  There was minimal undermining beneath the pedicle flap.
Island Pedicle Flap-Requiring Vessel Identification Text: The defect edges were debeveled with a #15 scalpel blade.  Given the location of the defect, shape of the defect and the proximity to free margins an island pedicle advancement flap was deemed most appropriate.  Using a sterile surgical marker, an appropriate advancement flap was drawn, based on the axial vessel mentioned above, incorporating the defect, outlining the appropriate donor tissue and placing the expected incisions within the relaxed skin tension lines where possible.    The area thus outlined was incised deep to adipose tissue with a #15 scalpel blade.  The skin margins were undermined to an appropriate distance in all directions around the primary defect and laterally outward around the island pedicle utilizing iris scissors.  There was minimal undermining beneath the pedicle flap.
Keystone Flap Text: The defect edges were debeveled with a #15 scalpel blade.  Given the location of the defect, shape of the defect a keystone flap was deemed most appropriate.  Using a sterile surgical marker, an appropriate keystone flap was drawn incorporating the defect, outlining the appropriate donor tissue and placing the expected incisions within the relaxed skin tension lines where possible. The area thus outlined was incised deep to adipose tissue with a #15 scalpel blade.  The skin margins were undermined to an appropriate distance in all directions around the primary defect and laterally outward around the flap utilizing iris scissors.
O-T Plasty Text: The defect edges were debeveled with a #15 scalpel blade.  Given the location of the defect, shape of the defect and the proximity to free margins an O-T plasty was deemed most appropriate.  Using a sterile surgical marker, an appropriate O-T plasty was drawn incorporating the defect and placing the expected incisions within the relaxed skin tension lines where possible.    The area thus outlined was incised deep to adipose tissue with a #15 scalpel blade.  The skin margins were undermined to an appropriate distance in all directions utilizing iris scissors.
O-Z Plasty Text: The defect edges were debeveled with a #15 scalpel blade.  Given the location of the defect, shape of the defect and the proximity to free margins an O-Z plasty (double transposition flap) was deemed most appropriate.  Using a sterile surgical marker, the appropriate transposition flaps were drawn incorporating the defect and placing the expected incisions within the relaxed skin tension lines where possible.    The area thus outlined was incised deep to adipose tissue with a #15 scalpel blade.  The skin margins were undermined to an appropriate distance in all directions utilizing iris scissors.  Hemostasis was achieved with electrocautery.  The flaps were then transposed into place, one clockwise and the other counterclockwise, and anchored with interrupted buried subcutaneous sutures.
Double O-Z Plasty Text: The defect edges were debeveled with a #15 scalpel blade.  Given the location of the defect, shape of the defect and the proximity to free margins a Double O-Z plasty (double transposition flap) was deemed most appropriate.  Using a sterile surgical marker, the appropriate transposition flaps were drawn incorporating the defect and placing the expected incisions within the relaxed skin tension lines where possible. The area thus outlined was incised deep to adipose tissue with a #15 scalpel blade.  The skin margins were undermined to an appropriate distance in all directions utilizing iris scissors.  Hemostasis was achieved with electrocautery.  The flaps were then transposed into place, one clockwise and the other counterclockwise, and anchored with interrupted buried subcutaneous sutures.
V-Y Plasty Text: The defect edges were debeveled with a #15 scalpel blade.  Given the location of the defect, shape of the defect and the proximity to free margins an V-Y advancement flap was deemed most appropriate.  Using a sterile surgical marker, an appropriate advancement flap was drawn incorporating the defect and placing the expected incisions within the relaxed skin tension lines where possible.    The area thus outlined was incised deep to adipose tissue with a #15 scalpel blade.  The skin margins were undermined to an appropriate distance in all directions utilizing iris scissors.
H Plasty Text: Given the location of the defect, shape of the defect and the proximity to free margins a H-plasty was deemed most appropriate for repair.  Using a sterile surgical marker, the appropriate advancement arms of the H-plasty were drawn incorporating the defect and placing the expected incisions within the relaxed skin tension lines where possible. The area thus outlined was incised deep to adipose tissue with a #15 scalpel blade. The skin margins were undermined to an appropriate distance in all directions utilizing iris scissors.  The opposing advancement arms were then advanced into place in opposite direction and anchored with interrupted buried subcutaneous sutures.
W Plasty Text: The lesion was extirpated to the level of the fat with a #15 scalpel blade.  Given the location of the defect, shape of the defect and the proximity to free margins a W-plasty was deemed most appropriate for repair.  Using a sterile surgical marker, the appropriate transposition arms of the W-plasty were drawn incorporating the defect and placing the expected incisions within the relaxed skin tension lines where possible.    The area thus outlined was incised deep to adipose tissue with a #15 scalpel blade.  The skin margins were undermined to an appropriate distance in all directions utilizing iris scissors.  The opposing transposition arms were then transposed into place in opposite direction and anchored with interrupted buried subcutaneous sutures.
Z Plasty Text: The lesion was extirpated to the level of the fat with a #15 scalpel blade.  Given the location of the defect, shape of the defect and the proximity to free margins a Z-plasty was deemed most appropriate for repair.  Using a sterile surgical marker, the appropriate transposition arms of the Z-plasty were drawn incorporating the defect and placing the expected incisions within the relaxed skin tension lines where possible.    The area thus outlined was incised deep to adipose tissue with a #15 scalpel blade.  The skin margins were undermined to an appropriate distance in all directions utilizing iris scissors.  The opposing transposition arms were then transposed into place in opposite direction and anchored with interrupted buried subcutaneous sutures.
Double Z Plasty Text: The lesion was extirpated to the level of the fat with a #15 scalpel blade. Given the location of the defect, shape of the defect and the proximity to free margins a double Z-plasty was deemed most appropriate for repair. Using a sterile surgical marker, the appropriate transposition arms of the double Z-plasty were drawn incorporating the defect and placing the expected incisions within the relaxed skin tension lines where possible. The area thus outlined was incised deep to adipose tissue with a #15 scalpel blade. The skin margins were undermined to an appropriate distance in all directions utilizing iris scissors. The opposing transposition arms were then transposed and carried over into place in opposite direction and anchored with interrupted buried subcutaneous sutures.
Zygomaticofacial Flap Text: Given the location of the defect, shape of the defect and the proximity to free margins a zygomaticofacial flap was deemed most appropriate for repair.  Using a sterile surgical marker, the appropriate flap was drawn incorporating the defect and placing the expected incisions within the relaxed skin tension lines where possible. The area thus outlined was incised deep to adipose tissue with a #15 scalpel blade with preservation of a vascular pedicle.  The skin margins were undermined to an appropriate distance in all directions utilizing iris scissors.  The flap was then placed into the defect and anchored with interrupted buried subcutaneous sutures.
Cheek Interpolation Flap Text: A decision was made to reconstruct the defect utilizing an interpolation axial flap and a staged reconstruction.  A telfa template was made of the defect.  This telfa template was then used to outline the Cheek Interpolation flap.  The donor area for the pedicle flap was then injected with anesthesia.  The flap was excised through the skin and subcutaneous tissue down to the layer of the underlying musculature.  The interpolation flap was carefully excised within this deep plane to maintain its blood supply.  The edges of the donor site were undermined.   The donor site was closed in a primary fashion.  The pedicle was then rotated into position and sutured.  Once the tube was sutured into place, adequate blood supply was confirmed with blanching and refill.  The pedicle was then wrapped with xeroform gauze and dressed appropriately with a telfa and gauze bandage to ensure continued blood supply and protect the attached pedicle.
Cheek-To-Nose Interpolation Flap Text: A decision was made to reconstruct the defect utilizing an interpolation axial flap and a staged reconstruction.  A telfa template was made of the defect.  This telfa template was then used to outline the Cheek-To-Nose Interpolation flap.  The donor area for the pedicle flap was then injected with anesthesia.  The flap was excised through the skin and subcutaneous tissue down to the layer of the underlying musculature.  The interpolation flap was carefully excised within this deep plane to maintain its blood supply.  The edges of the donor site were undermined.   The donor site was closed in a primary fashion.  The pedicle was then rotated into position and sutured.  Once the tube was sutured into place, adequate blood supply was confirmed with blanching and refill.  The pedicle was then wrapped with xeroform gauze and dressed appropriately with a telfa and gauze bandage to ensure continued blood supply and protect the attached pedicle.
Interpolation Flap Text: A decision was made to reconstruct the defect utilizing an interpolation axial flap and a staged reconstruction.  A telfa template was made of the defect.  This telfa template was then used to outline the interpolation flap.  The donor area for the pedicle flap was then injected with anesthesia.  The flap was excised through the skin and subcutaneous tissue down to the layer of the underlying musculature.  The interpolation flap was carefully excised within this deep plane to maintain its blood supply.  The edges of the donor site were undermined.   The donor site was closed in a primary fashion.  The pedicle was then rotated into position and sutured.  Once the tube was sutured into place, adequate blood supply was confirmed with blanching and refill.  The pedicle was then wrapped with xeroform gauze and dressed appropriately with a telfa and gauze bandage to ensure continued blood supply and protect the attached pedicle.
Melolabial Interpolation Flap Text: A decision was made to reconstruct the defect utilizing an interpolation axial flap and a staged reconstruction.  A telfa template was made of the defect.  This telfa template was then used to outline the melolabial interpolation flap.  The donor area for the pedicle flap was then injected with anesthesia.  The flap was excised through the skin and subcutaneous tissue down to the layer of the underlying musculature.  The pedicle flap was carefully excised within this deep plane to maintain its blood supply.  The edges of the donor site were undermined.   The donor site was closed in a primary fashion.  The pedicle was then rotated into position and sutured.  Once the tube was sutured into place, adequate blood supply was confirmed with blanching and refill.  The pedicle was then wrapped with xeroform gauze and dressed appropriately with a telfa and gauze bandage to ensure continued blood supply and protect the attached pedicle.
Mastoid Interpolation Flap Text: A decision was made to reconstruct the defect utilizing an interpolation axial flap and a staged reconstruction.  A telfa template was made of the defect.  This telfa template was then used to outline the mastoid interpolation flap.  The donor area for the pedicle flap was then injected with anesthesia.  The flap was excised through the skin and subcutaneous tissue down to the layer of the underlying musculature.  The pedicle flap was carefully excised within this deep plane to maintain its blood supply.  The edges of the donor site were undermined.   The donor site was closed in a primary fashion.  The pedicle was then rotated into position and sutured.  Once the tube was sutured into place, adequate blood supply was confirmed with blanching and refill.  The pedicle was then wrapped with xeroform gauze and dressed appropriately with a telfa and gauze bandage to ensure continued blood supply and protect the attached pedicle.
Posterior Auricular Interpolation Flap Text: A decision was made to reconstruct the defect utilizing an interpolation axial flap and a staged reconstruction.  A telfa template was made of the defect.  This telfa template was then used to outline the posterior auricular interpolation flap.  The donor area for the pedicle flap was then injected with anesthesia.  The flap was excised through the skin and subcutaneous tissue down to the layer of the underlying musculature.  The pedicle flap was carefully excised within this deep plane to maintain its blood supply.  The edges of the donor site were undermined.   The donor site was closed in a primary fashion.  The pedicle was then rotated into position and sutured.  Once the tube was sutured into place, adequate blood supply was confirmed with blanching and refill.  The pedicle was then wrapped with xeroform gauze and dressed appropriately with a telfa and gauze bandage to ensure continued blood supply and protect the attached pedicle.
Paramedian Forehead Flap Text: A decision was made to reconstruct the defect utilizing an interpolation axial flap and a staged reconstruction.  A telfa template was made of the defect.  This telfa template was then used to outline the paramedian forehead pedicle flap.  The donor area for the pedicle flap was then injected with anesthesia.  The flap was excised through the skin and subcutaneous tissue down to the layer of the underlying musculature.  The pedicle flap was carefully excised within this deep plane to maintain its blood supply.  The edges of the donor site were undermined.   The donor site was closed in a primary fashion.  The pedicle was then rotated into position and sutured.  Once the tube was sutured into place, adequate blood supply was confirmed with blanching and refill.  The pedicle was then wrapped with xeroform gauze and dressed appropriately with a telfa and gauze bandage to ensure continued blood supply and protect the attached pedicle.
Abbe Flap (Upper To Lower Lip) Text: The defect of the lower lip was assessed and measured.  Given the location and size of the defect, an Abbe flap was deemed most appropriate.  Using a sterile surgical marker, an appropriate Abbe flap was measured and drawn on the upper lip. Local anesthesia was then infiltrated.  A scalpel was then used to incise the upper lip through and through the skin, vermilion, muscle and mucosa, leaving the flap pedicled on the opposite side.  The flap was then rotated and transferred to the lower lip defect.  The flap was then sutured into place with a three layer technique, closing the orbicularis oris muscle layer with subcutaneous buried sutures, followed by a mucosal layer and an epidermal layer.
Abbe Flap (Lower To Upper Lip) Text: The defect of the upper lip was assessed and measured.  Given the location and size of the defect, an Abbe flap was deemed most appropriate.  Using a sterile surgical marker, an appropriate Abbe flap was measured and drawn on the lower lip. Local anesthesia was then infiltrated. A scalpel was then used to incise the upper lip through and through the skin, vermilion, muscle and mucosa, leaving the flap pedicled on the opposite side.  The flap was then rotated and transferred to the lower lip defect.  The flap was then sutured into place with a three layer technique, closing the orbicularis oris muscle layer with subcutaneous buried sutures, followed by a mucosal layer and an epidermal layer.
Estlander Flap (Upper To Lower Lip) Text: The defect of the lower lip was assessed and measured.  Given the location and size of the defect, an Estlander flap was deemed most appropriate.  Using a sterile surgical marker, an appropriate Estlander flap was measured and drawn on the upper lip. Local anesthesia was then infiltrated. A scalpel was then used to incise the lateral aspect of the flap, through skin, muscle and mucosa, leaving the flap pedicled medially.  The flap was then rotated and positioned to fill the lower lip defect.  The flap was then sutured into place with a three layer technique, closing the orbicularis oris muscle layer with subcutaneous buried sutures, followed by a mucosal layer and an epidermal layer.
Cheiloplasty (Less Than 50%) Text: A decision was made to reconstruct the defect with a  cheiloplasty.  The defect was undermined extensively.  Additional orbicularis oris muscle was excised with a 15 blade scalpel.  The defect was converted into a full thickness wedge, of less than 50% of the vertical height of the lip, to facilite a better cosmetic result.  Small vessels were then tied off with 5-0 monocyrl. The orbicularis oris, superficial fascia, adipose and dermis were then reapproximated.  After the deeper layers were approximated the epidermis was reapproximated with particular care given to realign the vermilion border.
Cheiloplasty (Complex) Text: A decision was made to reconstruct the defect with a  cheiloplasty.  The defect was undermined extensively.  Additional orbicularis oris muscle was excised with a 15 blade scalpel.  The defect was converted into a full thickness wedge to facilite a better cosmetic result.  Small vessels were then tied off with 5-0 monocyrl. The orbicularis oris, superficial fascia, adipose and dermis were then reapproximated.  After the deeper layers were approximated the epidermis was reapproximated with particular care given to realign the vermilion border.
Ear Wedge Repair Text: A wedge excision was completed by carrying down an excision through the full thickness of the ear and cartilage with an inward facing Burow's triangle. The wound was then closed in a layered fashion.
Full Thickness Lip Wedge Repair (Flap) Text: Given the location of the defect and the proximity to free margins a full thickness wedge repair was deemed most appropriate.  Using a sterile surgical marker, the appropriate repair was drawn incorporating the defect and placing the expected incisions perpendicular to the vermilion border.  The vermilion border was also meticulously outlined to ensure appropriate reapproximation during the repair.  The area thus outlined was incised through and through with a #15 scalpel blade.  The muscularis and dermis were reaproximated with deep sutures following hemostasis. Care was taken to realign the vermilion border before proceeding with the superficial closure.  Once the vermilion was realigned the superfical and mucosal closure was finished.
Ftsg Text: Given the location of the defect, shape of the defect and the proximity to free margins a full thickness skin graft was deemed most appropriate. The risks, benefits, and possible outcomes of this procedure were discussed along with the risks, benefits, and possible outcomes of other options for wound repair (including but not limited to: variations of complex closure, flaps, other types of grafts, and second intention). The patient verbalized understanding and consent to the outlined procedure. The patient understands should the surgical site heal in a manner they find unsatisfactory further interventions or referral to an additional specialist may be required. The graft donor and recipient site were prepped and draped in sterile fashion. Anesthesia was checked and supplemented as necessary. The primary defect shape was templated with sterile suture packing and transferred to the donor site. The area thus outlined was incised, along with planned standing tissue cones, deep to adipose tissue with a #15 scalpel blade.  The harvested graft was then trimmed of adipose tissue until only dermis and epidermis was left.  The skin margins of the secondary defect were undermined to an appropriate distance in all directions utilizing fine tipped surgical scissors. Hemostasis was achieved and then the secondary defect was sutured with interrupted buried subcutaneous sutures. The skin graft was then placed in the primary defect and oriented appropriately. The graft was sutured into place with simple interrupted and simple running cutaneous sutures.
Split-Thickness Skin Graft Text: The defect edges were debeveled with a #15 scalpel blade.  Given the location of the defect, shape of the defect and the proximity to free margins a split thickness skin graft was deemed most appropriate.  Using a sterile surgical marker, the primary defect shape was transferred to the donor site. The split thickness graft was then harvested.  The skin graft was then placed in the primary defect and oriented appropriately.
Pinch Graft Text: The defect edges were debeveled with a #15 scalpel blade. Given the location of the defect, shape of the defect and the proximity to free margins a pinch graft was deemed most appropriate. Using a sterile surgical marker, the primary defect shape was transferred to the donor site. The area thus outlined was incised deep to adipose tissue with a #15 scalpel blade.  The harvested graft was then trimmed of adipose tissue until only dermis and epidermis was left. The skin margins of the secondary defect were undermined to an appropriate distance in all directions utilizing iris scissors.  The secondary defect was closed with interrupted buried subcutaneous sutures.  The skin edges were then re-apposed with running  sutures.  The skin graft was then placed in the primary defect and oriented appropriately.
Burow's Graft Text: Given the location of the defect, shape of the defect, the proximity to free margins and the presence of a standing cone deformity a Burow's skin graft was deemed most appropriate. The risks, benefits, and possible outcomes of this procedure were discussed along with the risks, benefits, and possible outcomes of other options for wound repair (including but not limited to: variations of complex closure, flaps, other types of grafts, and second intention). The patient verbalized understanding and consent to the outlined procedure. The patient understands should the surgical site heal in a manner they find unsatisfactory further interventions or referral to an additional specialist may be required. The graft donor and recipient site were prepped and draped in sterile fashion. Anesthesia was checked and supplemented as necessary. The standing cone was removed and this tissue was then trimmed to the shape of the primary defect. The adipose tissue was also removed until only dermis and epidermis were left.  The skin margins of the secondary defect were undermined to an appropriate distance in all directions utilizing iris scissors.  The secondary defect was closed with interrupted buried subcutaneous sutures.  The skin graft was then placed in the primary defect and oriented appropriately. The graft was sutured into place with simple interrupted and simple running cutaneous sutures.
Cartilage Graft Text: The defect edges were debeveled with a #15 scalpel blade.  Given the location of the defect, shape of the defect, the fact the defect involved a full thickness cartilage defect a cartilage graft was deemed most appropriate.  An appropriate donor site was identified, cleansed, and anesthetized. The cartilage graft was then harvested and transferred to the recipient site, oriented appropriately and then sutured into place.  The secondary defect was then repaired using a primary closure.
Composite Graft Text: The defect edges were debeveled with a #15 scalpel blade.  Given the location of the defect, shape of the defect, the proximity to free margins and the fact the defect was full thickness a composite graft was deemed most appropriate.  The defect was outline and then transferred to the donor site.  A full thickness graft was then excised from the donor site. The graft was then placed in the primary defect, oriented appropriately and then sutured into place.  The secondary defect was then repaired using a primary closure.
Epidermal Autograft Text: The defect edges were debeveled with a #15 scalpel blade.  Given the location of the defect, shape of the defect and the proximity to free margins an epidermal autograft was deemed most appropriate.  Using a sterile surgical marker, the primary defect shape was transferred to the donor site. The epidermal graft was then harvested.  The skin graft was then placed in the primary defect and oriented appropriately.
Dermal Autograft Text: The defect edges were debeveled with a #15 scalpel blade.  Given the location of the defect, shape of the defect and the proximity to free margins a dermal autograft was deemed most appropriate.  Using a sterile surgical marker, the primary defect shape was transferred to the donor site. The area thus outlined was incised deep to adipose tissue with a #15 scalpel blade.  The harvested graft was then trimmed of adipose and epidermal tissue until only dermis was left.  The skin graft was then placed in the primary defect and oriented appropriately.
Skin Substitute Text: The defect edges were debeveled with a #15 scalpel blade.  Given the location of the defect, shape of the defect and the proximity to free margins a skin substitute graft was deemed most appropriate.  The graft material was trimmed to fit the size of the defect. The graft was then placed in the primary defect and oriented appropriately.
Tissue Cultured Epidermal Autograft Text: The defect edges were debeveled with a #15 scalpel blade.  Given the location of the defect, shape of the defect and the proximity to free margins a tissue cultured epidermal autograft was deemed most appropriate.  The graft was then trimmed to fit the size of the defect.  The graft was then placed in the primary defect and oriented appropriately.
Xenograft Text: The defect edges were debeveled with a #15 scalpel blade.  Given the location of the defect, shape of the defect and the proximity to free margins a xenograft was deemed most appropriate.  The graft was then trimmed to fit the size of the defect.  The graft was then placed in the primary defect and oriented appropriately.
Purse String (Simple) Text: Given the location of the defect and the characteristics of the surrounding skin a purse string closure was deemed most appropriate.  Undermining was performed circumferentially around the surgical defect.  A purse string suture was then placed and tightened.
Purse String (Intermediate) Text: Given the location of the defect and the characteristics of the surrounding skin a purse string intermediate closure was deemed most appropriate.  Undermining was performed circumferentially around the surgical defect.  A purse string suture was then placed and tightened.
Partial Purse String (Simple) Text: Given the location of the defect and the characteristics of the surrounding skin a simple purse string closure was deemed most appropriate.  Undermining was performed circumferentially around the surgical defect.  A purse string suture was then placed and tightened. Wound tension only allowed a partial closure of the circular defect.
Partial Purse String (Intermediate) Text: Given the location of the defect and the characteristics of the surrounding skin an intermediate purse string closure was deemed most appropriate.  Undermining was performed circumferentially around the surgical defect.  A purse string suture was then placed and tightened. Wound tension only allowed a partial closure of the circular defect.
Localized Dermabrasion With Wire Brush Text: The patient was draped in routine manner.  Localized dermabrasion using 3 x 17 mm wire brush was performed in routine manner to papillary dermis. This spot dermabrasion is being performed to complete skin cancer reconstruction. It also will eliminate the other sun damaged precancerous cells that are known to be part of the regional effect of a lifetime's worth of sun exposure. This localized dermabrasion is therapeutic and should not be considered cosmetic in any regard.
Tarsorrhaphy Text: A tarsorrhaphy was performed using Frost sutures.
Intermediate Repair And Flap Additional Text (Will Appearing After The Standard Complex Repair Text): The intermediate repair was not sufficient to completely close the primary defect. The remaining additional defect was repaired with the flap mentioned below.
Intermediate Repair And Graft Additional Text (Will Appearing After The Standard Complex Repair Text): The intermediate repair was not sufficient to completely close the primary defect. The remaining additional defect was repaired with the graft mentioned below.
Complex Repair And Flap Additional Text (Will Appearing After The Standard Complex Repair Text): The complex repair was not sufficient to completely close the primary defect. The remaining additional defect was repaired with the flap mentioned below.
Complex Repair And Graft Additional Text (Will Appearing After The Standard Complex Repair Text): The complex repair was not sufficient to completely close the primary defect. The remaining additional defect was repaired with the graft mentioned below.
Eyelid Full Thickness Repair - 09891: The eyelid defect was full thickness which required a wedge repair of the eyelid. Special care was taken to ensure that the eyelid margin was realligned when placing sutures.
Manual Repair Warning Statement: We plan on removing the manually selected variable below in favor of our much easier automatic structured text blocks found in the previous tab. We decided to do this to help make the flow better and give you the full power of structured data. Manual selection is never going to be ideal in our platform and I would encourage you to avoid using manual selection from this point on, especially since I will be sunsetting this feature. It is important that you do one of two things with the customized text below. First, you can save all of the text in a word file so you can have it for future reference. Second, transfer the text to the appropriate area in the Library tab. Lastly, if there is a flap or graft type which we do not have you need to let us know right away so I can add it in before the variable is hidden. No need to panic, we plan to give you roughly 6 months to make the change.
Same Histology In Subsequent Stages Text: The pattern and morphology of the tumor is as described in the first stage.
No Residual Tumor Seen Histology Text: There were no malignant cells seen in the sections examined.
Inflammation Suggestive Of Cancer Camouflage Histology Text: There was a dense lymphocytic infiltrate which prevented adequate histologic evaluation of adjacent structures.
Incidental Superficial Basal Cell Carcinoma Histology Text: One or more foci of “budding” basaloid tumor with peripheral palisading is attached superficially to the epidermis and/or adjacent superficial hair follicle(s) and is surrounded by a myxoid stroma.
Bcc Histology Text: There were numerous aggregates of basaloid cells.
Bcc Infiltrative Histology Text: Thin cords of basaloid tumor with angulated ends/spiky islands composed of few to many basaloid keratinocytes, embedded in a myxoid to sclerotic stroma. Retraction of the tumor islands away from the surrounding stroma is variable.
Bcc Micronodular Histology Text: Multiple small aggregates of basaloid cells are present within the dermis. These small nodules display variable subtle peripheral palisading and retraction artifact.
Bcc  Morpheaform/Sclerosing Histology Text: Thin cords of basaloid cells surrounded by a sclerotic collagenous stroma, with mostly absent peripheral palisading and stromal cleft formation.
Bcc  Nodular Histology Text: Discrete nests/nodules of malignant basaloid tumor is present within the dermis and/or attached to the epidermis. The tumor demonstrates palisading and is retracted away from a surrounding mucoid stroma.
Bcc  Nodulocystic Histology Text: Discrete nests/nodules of malignant basaloid tumor is present within the dermis and/or attached to the epidermis. The tumor demonstrates palisading and is retracted away from a surrounding mucoid stroma. Some areas of tumor show cystic degeneration.
Bcc Pigmented Histology Text: Discrete nests/nodules of malignant basaloid tumor is present within the dermis and/or attached to the epidermis. The tumor demonstrates palisading and is retracted away from a surrounding mucoid stroma. Some areas of tumor show deposition of melanin.
Bcc Superficial Histology Text: Multiple foci of “budding” basaloid tumor with peripheral palisading is attached superficially to the epidermis and/or adjacent superficial hair follicle(s) and is surrounded by a myxoid stroma.
Mixed Superficial And Nodular Bcc Histology Text: Histologic features of both superficial (Multiple foci of “budding” basaloid tumor with peripheral palisading is attached superficially to the epidermis and/or adjacent superficial hair follicle(s) and is surrounded by a myxoid stroma) AND nodular (Discrete nests/nodules of malignant basaloid tumor is present within the dermis and/or attached to the epidermis. The tumor demonstrates palisading and is retracted away from a surrounding mucoid stroma) basal cell carcinoma are present.
Mixed Nodular And Infiltrative Bcc Histology Text: Histologic features of both nodular (Discrete nests/nodules of malignant basaloid tumor is present within the dermis and/or attached to the epidermis. The tumor demonstrates palisading and is retracted away from a surrounding mucoid stroma) and infiltrative (Thin cords of basaloid tumor with angulated ends/spiky islands composed of few to many basaloid keratinocytes, embedded in a myxoid to sclerotic stroma. Retraction of the tumor islands away from the surrounding stroma is variable) basal cell carcinoma are present.
Mixed Nodular And Micronodular Bcc Histology Text: Histologic features of both nodular (Discrete nests/nodules of malignant basaloid tumor is present within the dermis and/or attached to the epidermis. The tumor demonstrates palisading and is retracted away from a surrounding mucoid stroma) and micronodular (Multiple small aggregates of basaloid cells are present within the dermis. These small nodules display variable subtle peripheral palisading and retraction artifact) basal cell carcinoma are present.
Metatypical Bcc Histology Text: areas of basaloid tumor with variable eosinophilic features and occasional apoptotic cells are visualized in the dermis. Peripheral palisading and stromal retraction is rare to absent.
Scc Histology Text: Full thickness keratinocyte atypia is observed microscopically. Focal dyskeratosis and apoptosis of keratinocytes is observed. The epidermis is acanthotic with growth of atypical keratinocytes beyond the level of the sebaceous glands.
Scc Moderately Differentiated Histology Text: Full thickness keratinocyte atypia is observed microscopically. Focal dyskeratosis and apoptosis of keratinocytes is observed. The epidermis is acanthotic with growth of atypical keratinocytes beyond the level of the sebaceous glands, many groups of keratinocytes display abnormal or absent keratin formation.
Scc Poorly Differentiated Histology Text: Full thickness keratinocyte atypia is observed microscopically. Focal dyskeratosis and apoptosis of keratinocytes is observed. The epidermis is acanthotic with growth of atypical keratinocytes beyond the level of the sebaceous glands, most keratinocytes show little to no keratinization.
Scc In Situ Histology Text: full thickness keratinocyte atypia is present within the epidermis with overlying loss of the granular cell layer and overlying parakeratosis.
Scc In Situ With Follicular Extension Histology Text: full thickness keratinocyte atypia is present within the epidermis with overlying loss of the granular cell layer and overlying parakeratosis. Adjacent follicular units are notably involved.
Mart-1 - Positive Histology Text: MART-1 staining demonstrates areas of higher density and clustering of melanocytes with Pagetoid spread upwards within the epidermis. The surgical margins are positive for tumor cells.
Mart-1 - Negative Histology Text: MART-1 staining demonstrates a normal density and pattern of melanocytes along the dermal-epidermal junction. The surgical margins are negative for tumor cells.
Information: Selecting Yes will display possible errors in your note based on the variables you have selected. This validation is only offered as a suggestion for you. PLEASE NOTE THAT THE VALIDATION TEXT WILL BE REMOVED WHEN YOU FINALIZE YOUR NOTE. IF YOU WANT TO FAX A PRELIMINARY NOTE YOU WILL NEED TO TOGGLE THIS TO 'NO' IF YOU DO NOT WANT IT IN YOUR FAXED NOTE.

## 2024-01-16 NOTE — PROCEDURE: POST-OP WOUND CHECK
Detail Level: Detailed
Add 74445 Cpt? (Important Note: In 2017 The Use Of 22959 Is Being Tracked By Cms To Determine Future Global Period Reimbursement For Global Periods): yes
Wound Evaluated By: Dr. Edmar Mora
Additional Comments: Patient presents for 1 week post mohs surgery wound check. Site is healing and granulating appropriately. No signs of poor wound healing or infection. Applied telfa, vaseline, and hypafix. Patient to continue daily dressing changes of site. Patient to follow up in 2 weeks.

## 2024-01-30 ENCOUNTER — APPOINTMENT (OUTPATIENT)
Dept: URBAN - METROPOLITAN AREA SURGERY 18 | Age: 79
Setting detail: DERMATOLOGY
End: 2024-01-31

## 2024-01-30 VITALS — HEART RATE: 78 BPM | DIASTOLIC BLOOD PRESSURE: 59 MMHG | SYSTOLIC BLOOD PRESSURE: 91 MMHG

## 2024-01-30 DIAGNOSIS — Z48.817 ENCOUNTER FOR SURGICAL AFTERCARE FOLLOWING SURGERY ON THE SKIN AND SUBCUTANEOUS TISSUE: ICD-10-CM

## 2024-01-30 PROBLEM — C44.722 SQUAMOUS CELL CARCINOMA OF SKIN OF RIGHT LOWER LIMB, INCLUDING HIP: Status: ACTIVE | Noted: 2024-01-30

## 2024-01-30 PROCEDURE — 13120 CMPLX RPR S/A/L 1.1-2.5 CM: CPT | Mod: 79

## 2024-01-30 PROCEDURE — 99024 POSTOP FOLLOW-UP VISIT: CPT

## 2024-01-30 PROCEDURE — OTHER COUNSELING: OTHER

## 2024-01-30 PROCEDURE — 17313 MOHS 1 STAGE T/A/L: CPT | Mod: 79

## 2024-01-30 PROCEDURE — OTHER MIPS QUALITY: OTHER

## 2024-01-30 PROCEDURE — OTHER MOHS SURGERY: OTHER

## 2024-01-30 PROCEDURE — OTHER POST-OP WOUND CHECK: OTHER

## 2024-01-30 ASSESSMENT — LOCATION SIMPLE DESCRIPTION DERM: LOCATION SIMPLE: RIGHT TEMPLE

## 2024-01-30 ASSESSMENT — LOCATION ZONE DERM: LOCATION ZONE: FACE

## 2024-01-30 ASSESSMENT — LOCATION DETAILED DESCRIPTION DERM: LOCATION DETAILED: RIGHT LATERAL TEMPLE

## 2024-01-30 NOTE — PROCEDURE: POST-OP WOUND CHECK
Detail Level: Detailed
Add 29860 Cpt? (Important Note: In 2017 The Use Of 12389 Is Being Tracked By Cms To Determine Future Global Period Reimbursement For Global Periods): yes
Wound Evaluated By: Dr. Edmar Mora
Additional Comments: Patient presents for a 2 week post Mohs flap wound check. Surgical site is clean, healing appropriately, and is well contoured. Advised paternity she can begin massage of the scar in 4 weeks and to keep applying Vaseline until all sutures are dissolved. Patient to follow-up as needed.

## 2024-01-30 NOTE — PROCEDURE: MOHS SURGERY
Mohs Case Number: 
Date Of Previous Biopsy (Optional): 1/03/2024
Previous Accession (Optional): [id number]
Biopsy Photograph Reviewed: Yes
Referring Physician (Optional): Valerie Mora M.D.
Consent Type: Consent 1 (Standard)
Eye Shield Used: No
Surgeon Performing Repair (Optional): Dr. Edmar Mora
Initial Size Of Lesion: 2
X Size Of Lesion In Cm (Optional): 1
Primary Defect Length In Cm (Final Defect Size - Required For Flaps/Grafts): 2.8
Primary Defect Width In Cm (Final Defect Size - Required For Flaps/Grafts): 1.8
Repair Type: Complex Repair
Which Eyelid Repair Cpt Are You Using?: 19253
Oculoplastic Surgeon Procedure Text (A): After obtaining clear surgical margins the patient was sent to oculoplastics for surgical repair.  The patient understands they will receive post-surgical care and follow-up from the referring physician's office.
Otolaryngologist Procedure Text (A): After obtaining clear surgical margins the patient was sent to otolaryngology for surgical repair.  The patient understands they will receive post-surgical care and follow-up from the referring physician's office.
Plastic Surgeon Procedure Text (A): After obtaining clear surgical margins the patient was sent to plastics for surgical repair.  The patient understands they will receive post-surgical care and follow-up from the referring physician's office.
Mid-Level Procedure Text (A): After obtaining clear surgical margins the patient was sent to a mid-level provider for surgical repair.  The patient understands they will receive post-surgical care and follow-up from the mid-level provider.
Provider Procedure Text (A): After obtaining clear surgical margins the defect was repaired by another provider.
Asc Procedure Text (A): After obtaining clear surgical margins the patient was sent to an ASC for surgical repair.  The patient understands they will receive post-surgical care and follow-up from the ASC physician.
Simple / Intermediate / Complex Repair - Final Wound Length In Cm: 2.5
Suturegard Retention Suture: 2-0 Nylon
Retention Suture Bite Size: 3 mm
Length To Time In Minutes Device Was In Place: 10
Undermining Type: Entire Wound
Debridement Text: The wound edges were debrided prior to proceeding with the closure to facilitate wound healing.
Helical Rim Text: The closure involved the helical rim.
Vermilion Border Text: The closure involved the vermilion border.
Nostril Rim Text: The closure involved the nostril rim.
Retention Suture Text: Retention sutures were placed to support the closure and prevent dehiscence.
Secondary Defect Length In Cm (Required For Flaps): 0
Location Indication Override (Is Already Calculated Based On Selected Body Location): Area M
Area H Indication Text: Tumors in this location are included in Area H (eyelids, eyebrows, nose, lips, chin, ear, pre-auricular, post-auricular, temple, genitalia, hands, feet, ankles and areola).  Tissue conservation is critical in these anatomic locations.
Area M Indication Text: Tumors in this location are included in Area M (cheek, forehead, scalp, neck, jawline and pretibial skin).  Mohs surgery is indicated for tumors in these anatomic locations.
Area L Indication Text: Tumors in this location are included in Area L (trunk and extremities).  Mohs surgery is indicated for larger tumors, or tumors with aggressive histologic features, in these anatomic locations.
Tumor Debulked?: scalpel
Depth Of Tumor Invasion (For Histology): tumor not visualized (deep and peripheral margins are clear of tumor)
Perineural Invasion (For Histology - Be Specific If Possible): absent
Special Stains Stage 1 - Results: Base On Clearance Noted Above
Stage 2: Additional Anesthesia Type: 1% lidocaine with 1:100,000 epinephrine and 408mcg clindamycin/ml and a 1:10 solution of 8.4% sodium bicarbonate
Staging Info: By selecting yes to the question above you will include information on AJCC 8 tumor staging in your Mohs note. Information on tumor staging will be automatically added for SCCs on the head and neck. AJCC 8 includes tumor size, tumor depth, perineural involvement and bone invasion.
Tumor Depth: Less than 6mm from granular layer and no invasion beyond the subcutaneous fat
Was The Patient On Physician Recommended Anticoagulation Therapy?: Please Select the Appropriate Response
Medical Necessity Statement: Based on my medical judgement, Mohs surgery is the most appropriate treatment for this cancer compared to other treatments.
Alternatives Discussed Intro (Do Not Add Period): I discussed alternative treatments to Mohs surgery and specifically discussed the risks and benefits of
Consent 1/Introductory Paragraph: The rationale for Mohs was explained to the patient and consent was obtained. The risks, benefits and alternatives to therapy were discussed in detail. Specifically, the risks of infection, scarring, bleeding, prolonged wound healing, incomplete removal, allergy to anesthesia, nerve injury and recurrence were addressed. Prior to the procedure, the treatment site was clearly identified and confirmed by the patient. All components of Universal Protocol/PAUSE Rule completed.
Consent 2/Introductory Paragraph: Mohs surgery was explained to the patient and consent was obtained. The risks, benefits and alternatives to therapy were discussed in detail. Specifically, the risks of infection, scarring, bleeding, prolonged wound healing, incomplete removal, allergy to anesthesia, nerve injury and recurrence were addressed. Prior to the procedure, the treatment site was clearly identified and confirmed by the patient. All components of Universal Protocol/PAUSE Rule completed.
Consent 3/Introductory Paragraph: I gave the patient a chance to ask questions they had about the procedure.  Following this I explained the Mohs procedure and consent was obtained. The risks, benefits and alternatives to therapy were discussed in detail. Specifically, the risks of infection, scarring, bleeding, prolonged wound healing, incomplete removal, allergy to anesthesia, nerve injury and recurrence were addressed. Prior to the procedure, the treatment site was clearly identified and confirmed by the patient. All components of Universal Protocol/PAUSE Rule completed.
Consent (Temporal Branch)/Introductory Paragraph: The rationale for Mohs was explained to the patient and consent was obtained. The risks, benefits and alternatives to therapy were discussed in detail. Specifically, the risks of damage to the temporal branch of the facial nerve, infection, scarring, bleeding, prolonged wound healing, incomplete removal, allergy to anesthesia, and recurrence were addressed. Prior to the procedure, the treatment site was clearly identified and confirmed by the patient. All components of Universal Protocol/PAUSE Rule completed.
Consent (Marginal Mandibular)/Introductory Paragraph: The rationale for Mohs was explained to the patient and consent was obtained. The risks, benefits and alternatives to therapy were discussed in detail. Specifically, the risks of damage to the marginal mandibular branch of the facial nerve, infection, scarring, bleeding, prolonged wound healing, incomplete removal, allergy to anesthesia, and recurrence were addressed. Prior to the procedure, the treatment site was clearly identified and confirmed by the patient. All components of Universal Protocol/PAUSE Rule completed.
Consent (Spinal Accessory)/Introductory Paragraph: The rationale for Mohs was explained to the patient and consent was obtained. The risks, benefits and alternatives to therapy were discussed in detail. Specifically, the risks of damage to the spinal accessory nerve, infection, scarring, bleeding, prolonged wound healing, incomplete removal, allergy to anesthesia, and recurrence were addressed. Prior to the procedure, the treatment site was clearly identified and confirmed by the patient. All components of Universal Protocol/PAUSE Rule completed.
Consent (Near Eyelid Margin)/Introductory Paragraph: The rationale for Mohs was explained to the patient and consent was obtained. The risks, benefits and alternatives to therapy were discussed in detail. Specifically, the risks of ectropion or eyelid deformity, infection, scarring, bleeding, prolonged wound healing, incomplete removal, allergy to anesthesia, nerve injury and recurrence were addressed. Prior to the procedure, the treatment site was clearly identified and confirmed by the patient. All components of Universal Protocol/PAUSE Rule completed.
Consent (Ear)/Introductory Paragraph: The rationale for Mohs was explained to the patient and consent was obtained. The risks, benefits and alternatives to therapy were discussed in detail. Specifically, the risks of ear deformity, infection, scarring, bleeding, prolonged wound healing, incomplete removal, allergy to anesthesia, nerve injury and recurrence were addressed. Prior to the procedure, the treatment site was clearly identified and confirmed by the patient. All components of Universal Protocol/PAUSE Rule completed.
Consent (Nose)/Introductory Paragraph: The rationale for Mohs was explained to the patient and consent was obtained. The risks, benefits and alternatives to therapy were discussed in detail. Specifically, the risks of nasal deformity, changes in the flow of air through the nose, infection, scarring, bleeding, prolonged wound healing, incomplete removal, allergy to anesthesia, nerve injury and recurrence were addressed. Prior to the procedure, the treatment site was clearly identified and confirmed by the patient. All components of Universal Protocol/PAUSE Rule completed.
Consent (Lip)/Introductory Paragraph: The rationale for Mohs was explained to the patient and consent was obtained. The risks, benefits and alternatives to therapy were discussed in detail. Specifically, the risks of lip deformity, changes in the oral aperture, infection, scarring, bleeding, prolonged wound healing, incomplete removal, allergy to anesthesia, nerve injury and recurrence were addressed. Prior to the procedure, the treatment site was clearly identified and confirmed by the patient. All components of Universal Protocol/PAUSE Rule completed.
Consent (Scalp)/Introductory Paragraph: The rationale for Mohs was explained to the patient and consent was obtained. The risks, benefits and alternatives to therapy were discussed in detail. Specifically, the risks of changes in hair growth pattern secondary to repair, infection, scarring, bleeding, prolonged wound healing, incomplete removal, allergy to anesthesia, nerve injury and recurrence were addressed. Prior to the procedure, the treatment site was clearly identified and confirmed by the patient. All components of Universal Protocol/PAUSE Rule completed.
Detail Level: Detailed
Postop Diagnosis: same
Anesthesia Volume In Cc: 6
Hemostasis: Electrodesiccation
Estimated Blood Loss (Cc): minimal
Stage 8: Additional Anesthesia Type: 1% lidocaine with epinephrine
Repair Anesthesia Method: local infiltration
Brow Lift Text: A midfrontal incision was made medially to the defect to allow access to the tissues just superior to the left eyebrow. Following careful dissection inferiorly in a supraperiosteal plane to the level of the left eyebrow, several 3-0 monocryl sutures were used to resuspend the eyebrow orbicularis oculi muscular unit to the superior frontal bone periosteum. This resulted in an appropriate reapproximation of static eyebrow symmetry and correction of the left brow ptosis.
Deep Sutures: 3-0 PGCL
Epidermal Sutures: 3-0 Prolene
Epidermal Closure: running cuticular
Suturegard Intro: Intraoperative tissue expansion was performed, utilizing the SUTUREGARD device, in order to reduce wound tension.
Suturegard Body: The suture ends were repeatedly re-tightened and re-clamped to achieve the desired tissue expansion.
Hemigard Intro: Due to skin fragility and wound tension, it was decided to use HEMIGARD adhesive retention suture devices to permit a linear closure. The skin was cleaned and dried for a 6cm distance away from the wound. Excessive hair, if present, was removed to allow for adhesion.
Hemigard Postcare Instructions: The HEMIGARD strips are to remain completely dry for at least 5-7 days.
Donor Site Anesthesia Type: same as repair anesthesia
Graft Basting Suture (Optional): 5-0 Fast Absorbing Gut
Graft Donor Site Dermal Sutures (Optional): 4-0 Monocryl
Epidermal Closure Graft Donor Site (Optional): simple interrupted
Graft Donor Site Bandage (Optional-Leave Blank If You Don't Want In Note): Vaseline, telfa pad, and hypoallergenic tape were applied to the donor site.
Closure 2 Information: This tab is for additional flaps and grafts, including complex repair and grafts and complex repair and flaps. You can also specify a different location for the additional defect, if the location is the same you do not need to select a new one. We will insert the automated text for the repair you select below just as we do for solitary flaps and grafts. Please note that at this time if you select a location with a different insurance zone you will need to override the ICD10 and CPT if appropriate.
Closure 3 Information: This tab is for additional flaps and grafts above and beyond our usual structured repairs.  Please note if you enter information here it will not currently bill and you will need to add the billing information manually.
Wound Care: Petrolatum
Dressing: waterproof dressing and pressure dressing
Dressing (No Sutures): dry sterile dressing
Suture Removal: 14 days
Unna Boot Text: An Unna boot was placed to help immobilize the limb and facilitate more rapid healing.
Home Suture Removal Text: Patient was provided instructions on removing sutures and will remove their sutures at home.  If they have any questions or difficulties they will call the office.
Post-Care Instructions: I reviewed with the patient in detail post-care instructions. Patient is not to engage in any heavy lifting, exercise, or swimming for the next 14 days. Should the patient develop any fevers, chills, bleeding, severe pain patient will contact the office immediately.
Pain Refusal Text: I offered to prescribe pain medication but the patient refused to take this medication.
Mauc Instructions: By selecting yes to the question below the MAUC number will be added into the note.  This will be calculated automatically based on the diagnosis chosen, the size entered, the body zone selected (H,M,L) and the specific indications you chose. You will also have the option to override the Mohs AUC if you disagree with the automatically calculated number and this option is found in the Case Summary tab.
Where Do You Want The Question To Include Opioid Counseling Located?: Case Summary Tab
Eye Protection Verbiage: Before proceeding with the stage, a plastic scleral shield was inserted. The globe was anesthetized with a few drops of 1% lidocaine with 1:100,000 epinephrine. Then, an appropriate sized scleral shield was chosen and coated with lacrilube ointment. The shield was gently inserted and left in place for the duration of each stage. After the stage was completed, the shield was gently removed.
Mohs Method Verbiage: An incision at a 45 degree angle following the standard Mohs approach was done and the specimen was harvested as a microscopic controlled layer.
Surgeon/Pathologist Verbiage (Will Incorporate Name Of Surgeon From Intro If Not Blank): operated in two distinct and integrated capacities as the surgeon and pathologist.
Mohs Histo Method Verbiage: Each section was then chromacoded and processed in the Mohs lab using the Mohs protocol and submitted for frozen section.
Subsequent Stages Histo Method Verbiage: Using a similar technique to that described above, a thin layer of tissue was removed from all areas where tumor was visible on the previous stage.  The tissue was again oriented, mapped, dyed, and processed as above.
Mohs Rapid Report Verbiage: The area of clinically evident tumor was marked with skin marking ink and appropriately hatched.  The initial incision was made following the Mohs approach through the skin.  The specimen was taken to the lab, divided into the necessary number of pieces, chromacoded and processed according to the Mohs protocol.  This was repeated in successive stages until a tumor free defect was achieved.
Complex Repair Preamble Text (Leave Blank If You Do Not Want): The risks, benefits, and possible outcomes of this procedure were discussed along with the risks, benefits, and possible outcomes of other options for wound repair (including but not limited to: variations of complex closure, flaps, grafts, and second intention). The patient verbalized understanding and consent to the outlined procedure. The patient understands should the surgical site heal in a manner they find unsatisfactory further interventions or referral to an additional specialist may be required. Following sterile preparation and drape, two standing cones were removed at opposite ends of the postoperative defect within, or parallel to, the relaxed skin tension lines. The resulting defect was undermined widely and bilaterally in the appropriate surgical plane taking care to preserve local arteries, veins, nerves, and other structures of anatomic importance. Hemostasis was achieved and then the wound was sutured together in layered fashion.
Intermediate Repair Preamble Text (Leave Blank If You Do Not Want): The risks, benefits, and possible outcomes of this procedure were discussed along with the risks, benefits, and possible outcomes of other options for wound repair (including but not limited to: variations of complex closure, flaps, grafts, and second intention). The patient verbalized understanding and consent to the outlined procedure. The patient understands should the surgical site heal in a manner they find unsatisfactory further interventions or referral to an additional specialist may be required. Following sterile preparation and drape, two standing cones were removed at opposite ends of the postoperative defect (as needed) within, or parallel to, the relaxed skin tension lines. The resulting defect was undermined, as necessary, in the appropriate surgical plane taking care to preserve local arteries, veins, nerves, and other structures of anatomic importance. Hemostasis was achieved and then the wound was sutured together in layered fashion.
Crescentic Complex Repair Preamble Text (Leave Blank If You Do Not Want): Extensive wide undermining was performed.
Crescentic Intermediate Repair Preamble Text (Leave Blank If You Do Not Want): Undermining was performed with blunt dissection.
Non-Graft Cartilage Fenestration Text: The cartilage was fenestrated with a 2mm punch biopsy to help facilitate healing.
Graft Cartilage Fenestration Text: The cartilage was fenestrated with a 2mm punch biopsy to help facilitate graft survival and healing.
Secondary Intention Text (Leave Blank If You Do Not Want): Given the location of the defect, depth of the postoperative wound and inherent tension at the surgical site, healing by secondary intention was deemed most appropriate for wound closure. The risks, benefits, and possible outcomes of this procedure were discussed along with the risks, benefits, and possible outcomes of other options for wound repair (including but not limited to: complex closure, flaps, and grafts). The patient verbalized understanding and voiced a preference for secondary intention over reconstruction. The patient understands that not all healing occurs in predictable fashion or within a predictable time period. If the resulting scar is unsatisfactory, affects/distorts a free margin or other adjacent structures, or is slow to heal the patient understands further surgical intervention or even referral to an additional specialist may be required.
No Repair - Repaired With Adjacent Surgical Defect Text (Leave Blank If You Do Not Want): After obtaining clear surgical margins the defect was repaired concurrently with another surgical defect which was in close approximation.
Adjacent Tissue Transfer Text: The defect edges were debeveled with a #15 scalpel blade.  Given the location of the defect and the proximity to free margins an adjacent tissue transfer was deemed most appropriate.  Using a sterile surgical marker, an appropriate flap was drawn incorporating the defect and placing the expected incisions within the relaxed skin tension lines where possible.    The area thus outlined was incised deep to adipose tissue with a #15 scalpel blade.  The skin margins were undermined to an appropriate distance in all directions utilizing iris scissors.
Advancement Flap (Single) Text: Given the location of the defect, inherent tension at the surgical site, and the proximity to free margins an advancement flap was deemed most appropriate for wound reconstruction. The risks, benefits, and possible outcomes of this procedure were discussed along with the risks, benefits, and possible outcomes of other options for wound repair (including but not limited to: complex closure, other flaps, grafts, and second intention). The patient verbalized understanding and consent to the outlined procedure. The patient verbalized understanding that intraoperative conditions may necessitate a change in the outlined procedure resulting in modification of the original surgical plan. This decision may be made at the discretion of the surgeon, and is due to factors subject to change or that are difficult to predict preoperatively. Using a sterile surgical marker, an appropriate advancement flap was drawn incorporating the defect and placing the expected incisions within the relaxed skin tension lines where possible. The surgical site and surrounding skin were prepped and draped in sterile fashion.  Standing cones were removed from opposite ends of the defect within the appropriate surgical plane, repositioning as necessary based upon local tension, proximity to free margins/other anatomic structures, and position of the relaxed skin tension lines. The resulting defect was undermined widely and bilaterally in the appropriate surgical plane taking care to preserve local arteries, veins, nerves, and other structures of anatomic importance. This created a sliding flap capable of advancing across the defect to close the wound under minimal tension. Hemostasis was achieved and then the wound was sutured in layered fashion.
Advancement Flap (Double) Text: Given the location of the defect, inherent tension at the surgical site, and the proximity to free margins a double (bilateral) advancement flap was deemed most appropriate for wound reconstruction. The risks, benefits, and possible outcomes of this procedure were discussed along with the risks, benefits, and possible outcomes of other options for wound repair (including but not limited to: complex closure, other flaps, grafts, and second intention). The patient verbalized understanding and consent to the outlined procedure. The patient verbalized understanding that intraoperative conditions may necessitate a change in the outlined procedure resulting in modification of the original surgical plan. This decision may be made at the discretion of the surgeon, and is due to factors subject to change or that are difficult to predict preoperatively. Using a sterile surgical marker, an appropriate bilateral advancement flap was drawn incorporating the defect and placing the expected incisions within the relaxed skin tension lines where possible. The surgical site and surrounding skin were prepped and draped in sterile fashion.  Standing cones were removed from opposite ends of the defect within the appropriate surgical plane, repositioning as necessary based upon local tension, proximity to free margins/other anatomic structures, and position of the relaxed skin tension lines. The resulting defect was undermined widely and bilaterally in the appropriate surgical plane taking care to preserve local arteries, veins, nerves, and other structures of anatomic importance. This created a sliding flap capable of advancing bilaterally across the defect to close the wound under minimal tension. Hemostasis was achieved and then the wound was sutured in layered fashion.
Burow's Advancement Flap Text: The defect edges were debeveled with a #15 scalpel blade.  Given the location of the defect and the proximity to free margins a Burow's advancement flap was deemed most appropriate.  Using a sterile surgical marker, the appropriate advancement flap was drawn incorporating the defect and placing the expected incisions within the relaxed skin tension lines where possible.    The area thus outlined was incised deep to adipose tissue with a #15 scalpel blade.  The skin margins were undermined to an appropriate distance in all directions utilizing iris scissors.
Chonodrocutaneous Helical Advancement Flap Text: Given the location of the defect, inherent tension at the surgical site, and the proximity to free margins a Burow’s advancement flap was deemed most appropriate for wound reconstruction. The risks, benefits, and possible outcomes of this procedure were discussed along with the risks, benefits, and possible outcomes of other options for wound repair (including but not limited to: complex closure, other flaps, grafts, and second intention). The patient verbalized understanding and consent to the outlined procedure. The patient verbalized understanding that intraoperative conditions may necessitate a change in the outlined procedure resulting in modification of the original surgical plan. This decision may be made at the discretion of the surgeon, and is due to factors subject to change or that are difficult to predict preoperatively. Using a sterile surgical marker, an appropriate Burow’s advancement flap was drawn incorporating the defect and placing the expected incisions within the relaxed skin tension lines where possible. The surgical site and surrounding skin were prepped and draped in sterile fashion.  Standing cones were removed from opposite ends of the defect within the appropriate surgical plane, repositioning as necessary based upon local tension, proximity to free margins/other anatomic structures, and position of the relaxed skin tension lines. The position of one dog ear was modified, moving the standing cone along a lateral plane to lie in a more favorable location for closure.  The resulting defect was undermined widely and bilaterally in the appropriate surgical plane taking care to preserve local arteries, veins, nerves, and other structures of anatomic importance. This created a sliding flap capable of advancing across the defect to close the wound under minimal tension. Hemostasis was achieved and then the wound was sutured in layered fashion.
Crescentic Advancement Flap Text: Given the location of the defect, inherent tension at the surgical site, and the proximity to free margins a Crescentic advancement flap was deemed most appropriate for wound reconstruction. The risks, benefits, and possible outcomes of this procedure were discussed along with the risks, benefits, and possible outcomes of other options for wound repair (including but not limited to: complex closure, other flaps, grafts, and second intention). The patient verbalized understanding and consent to the outlined procedure. The patient verbalized understanding that intraoperative conditions may necessitate a change in the outlined procedure resulting in modification of the original surgical plan. This decision may be made at the discretion of the surgeon, and is due to factors subject to change or that are difficult to predict preoperatively. Using a sterile surgical marker, an appropriate Crescentic advancement flap was drawn incorporating the defect and placing the expected incisions within the relaxed skin tension lines where possible. The surgical site and surrounding skin were prepped and draped in sterile fashion.  Standing cones were removed from opposite ends of the defect within the appropriate surgical plane, repositioning as necessary based upon local tension, proximity to free margins/other anatomic structures, and position of the relaxed skin tension lines. The shape of one standing cone was modified, designed in a crescentic shape to allow for sliding movement in both the horizontal and vertical plane.  The resulting defect was undermined widely and bilaterally in the appropriate surgical plane taking care to preserve local arteries, veins, nerves, and other structures of anatomic importance. This created a sliding flap capable of advancing across the defect to close the wound under minimal tension. Hemostasis was achieved and then the wound was sutured in layered fashion.
A-T Advancement Flap Text: Given the location of the defect, inherent tension at the surgical site, and the proximity to free margins an A to T advancement flap was deemed most appropriate for wound reconstruction. The risks, benefits, and possible outcomes of this procedure were discussed along with the risks, benefits, and possible outcomes of other options for wound repair (including but not limited to: complex closure, other flaps, grafts, and second intention). The patient verbalized understanding and consent to the outlined procedure. The patient verbalized understanding that intraoperative conditions may necessitate a change in the outlined procedure resulting in modification of the original surgical plan. This decision may be made at the discretion of the surgeon, and is due to factors subject to change or that are difficult to predict preoperatively. Using a sterile surgical marker, an appropriate A to T advancement flap was drawn incorporating the defect and placing the expected incisions within the relaxed skin tension lines where possible. The surgical site and surrounding skin were prepped and draped in sterile fashion.  Standing cones were removed from opposite ends of the defect within the appropriate surgical plane, repositioning as necessary based upon local tension, proximity to free margins/other anatomic structures, and position of the relaxed skin tension lines. The shape of one standing cone was modified, taking two smaller standing cones (each approximately half the size of the traditional standing cone) and repositioning them along opposite ends of a lateral plane (with respect to the defect).  The resulting defect was undermined widely and bilaterally in the appropriate surgical plane taking care to preserve local arteries, veins, nerves, and other structures of anatomic importance. This created a sliding flap capable of advancing across the defect to close the wound under minimal tension. Hemostasis was achieved and then the wound was sutured in layered fashion.
O-T Advancement Flap Text: The defect edges were debeveled with a #15 scalpel blade.  Given the location of the defect, shape of the defect and the proximity to free margins an O-T advancement flap was deemed most appropriate.  Using a sterile surgical marker, an appropriate advancement flap was drawn incorporating the defect and placing the expected incisions within the relaxed skin tension lines where possible.    The area thus outlined was incised deep to adipose tissue with a #15 scalpel blade.  The skin margins were undermined to an appropriate distance in all directions utilizing iris scissors.
O-L Flap Text: The defect edges were debeveled with a #15 scalpel blade.  Given the location of the defect, shape of the defect and the proximity to free margins an O-L flap was deemed most appropriate.  Using a sterile surgical marker, an appropriate advancement flap was drawn incorporating the defect and placing the expected incisions within the relaxed skin tension lines where possible.    The area thus outlined was incised deep to adipose tissue with a #15 scalpel blade.  The skin margins were undermined to an appropriate distance in all directions utilizing iris scissors.
O-Z Flap Text: The defect edges were debeveled with a #15 scalpel blade.  Given the location of the defect, shape of the defect and the proximity to free margins an O-Z flap was deemed most appropriate.  Using a sterile surgical marker, an appropriate transposition flap was drawn incorporating the defect and placing the expected incisions within the relaxed skin tension lines where possible. The area thus outlined was incised deep to adipose tissue with a #15 scalpel blade.  The skin margins were undermined to an appropriate distance in all directions utilizing iris scissors.
Double O-Z Flap Text: The defect edges were debeveled with a #15 scalpel blade.  Given the location of the defect, shape of the defect and the proximity to free margins a Double O-Z flap was deemed most appropriate.  Using a sterile surgical marker, an appropriate transposition flap was drawn incorporating the defect and placing the expected incisions within the relaxed skin tension lines where possible. The area thus outlined was incised deep to adipose tissue with a #15 scalpel blade.  The skin margins were undermined to an appropriate distance in all directions utilizing iris scissors.
V-Y Flap Text: Given the location of the defect, inherent tension at the surgical site, and the proximity to free margins an island pedicle advancement flap was deemed most appropriate for wound reconstruction. The risks, benefits, and possible outcomes of this procedure were discussed along with the risks, benefits, and possible outcomes of other options for wound repair (including but not limited to: complex closure, other flaps, grafts, and second intention). The patient verbalized understanding and consent to the outlined procedure. The patient verbalized understanding that intraoperative conditions may necessitate a change in the outlined procedure resulting in modification of the original surgical plan. This decision may be made at the discretion of the surgeon, and is due to factors subject to change or that are difficult to predict preoperatively. \\n\\nUsing a sterile surgical marker, an appropriate island pedicle advancement flap was drawn incorporating the defect and placing the expected incisions within the relaxed skin tension lines where possible. The surgical site and surrounding skin were prepped and draped in sterile fashion.  The island pedicle advancement flap was incised and undermined within the appropriate surgical plane, creating a mobile flap supported by a central vascular pedicle.  The wound edges surrounding the flap and primary defect were undermined widely and bilaterally in the appropriate surgical plane taking care to preserve local arteries, veins, nerves, and other structures of anatomic importance. This created a sliding flap capable of advancing across the defect to close the wound under minimal tension. Hemostasis was achieved and then the wound was sutured in layered fashion.
Advancement-Rotation Flap Text: The defect edges were debeveled with a #15 scalpel blade.  Given the location of the defect, shape of the defect and the proximity to free margins an advancement-rotation flap was deemed most appropriate.  Using a sterile surgical marker, an appropriate flap was drawn incorporating the defect and placing the expected incisions within the relaxed skin tension lines where possible. The area thus outlined was incised deep to adipose tissue with a #15 scalpel blade.  The skin margins were undermined to an appropriate distance in all directions utilizing iris scissors.
Mercedes Flap Text: Given the location of the defect, inherent tension at the surgical site, and the proximity to free margins a Mercedes (Tripolar) Advancement Flap was deemed most appropriate for wound reconstruction. The risks, benefits, and possible outcomes of this procedure were discussed along with the risks, benefits, and possible outcomes of other options for wound repair (including but not limited to: complex closure, other flaps, grafts, and second intention). The patient verbalized understanding and consent to the outlined procedure. The patient verbalized understanding that intraoperative conditions may necessitate a change in the outlined procedure resulting in modification of the original surgical plan. This decision may be made at the discretion of the surgeon, and is due to factors subject to change or that are difficult to predict preoperatively. Using a sterile surgical marker, an appropriate tripolar advancement flap was drawn incorporating the defect and placing the expected incisions within the relaxed skin tension lines where possible. The surgical site and surrounding skin were prepped and draped in sterile fashion.  Anesthesia was checked and supplemented as necessary. Standing cones were removed from three “poles” of the defect within the appropriate surgical plane, repositioning as necessary based upon local tension, proximity to free margins/other anatomic structures, and position of the relaxed skin tension lines. The resulting defect was undermined widely and bilaterally in the appropriate surgical plane taking care to preserve local arteries, veins, nerves, and other structures of anatomic importance. This created a sliding flap capable of advancing across the defect from three reservoirs to close the wound under minimal tension. Hemostasis was achieved and then the wound was sutured in layered fashion.
Modified Advancement Flap Text: Given the location of the defect, inherent tension at the surgical site, and the proximity to free margins a modified advancement flap was deemed most appropriate for wound reconstruction. The risks, benefits, and possible outcomes of this procedure were discussed along with the risks, benefits, and possible outcomes of other options for wound repair (including but not limited to: complex closure, other flaps, grafts, and second intention). The patient verbalized understanding and consent to the outlined procedure. The patient verbalized understanding that intraoperative conditions may necessitate a change in the outlined procedure resulting in modification of the original surgical plan. This decision may be made at the discretion of the surgeon, and is due to factors subject to change or that are difficult to predict preoperatively. Using a sterile surgical marker, an appropriate advancement flap was drawn incorporating the defect and placing the expected incisions within the relaxed skin tension lines where possible.\\n\\nThe surgical site and surrounding skin were prepped and draped in sterile fashion.  Standing cones were removed from opposite ends of the defect within the appropriate surgical plane, repositioning as necessary based upon local tension, proximity to free margins/other anatomic structures, and position of the relaxed skin tension lines. The resulting defect was undermined widely and bilaterally in the appropriate surgical plane taking care to preserve local arteries, veins, nerves, and other structures of anatomic importance. This created a sliding flap capable of advancing across the defect to close the wound under minimal tension. Hemostasis was achieved and then the wound was sutured in layered fashion.
Mucosal Advancement Flap Text: Given the location of the defect, shape of the defect and the proximity to free margins a mucosal advancement flap was deemed most appropriate. Incisions were made with a 15 blade scalpel in the appropriate fashion along the cutaneous vermilion border and the mucosal lip. The remaining actinically damaged mucosal tissue was excised.  The mucosal advancement flap was then elevated to the gingival sulcus with care taken to preserve the neurovascular structures and advanced into the primary defect. Care was taken to ensure that precise realignment of the vermilion border was achieved.
Peng Advancement Flap Text: The defect edges were debeveled with a #15 scalpel blade.  Given the location of the defect, shape of the defect and the proximity to free margins a Peng advancement flap was deemed most appropriate.  Using a sterile surgical marker, an appropriate advancement flap was drawn incorporating the defect and placing the expected incisions within the relaxed skin tension lines where possible. The area thus outlined was incised deep to adipose tissue with a #15 scalpel blade.  The skin margins were undermined to an appropriate distance in all directions utilizing iris scissors.
Hatchet Flap Text: The defect edges were debeveled with a #15 scalpel blade.  Given the location of the defect, shape of the defect and the proximity to free margins a hatchet flap was deemed most appropriate.  Using a sterile surgical marker, an appropriate hatchet flap was drawn incorporating the defect and placing the expected incisions within the relaxed skin tension lines where possible.    The area thus outlined was incised deep to adipose tissue with a #15 scalpel blade.  The skin margins were undermined to an appropriate distance in all directions utilizing iris scissors.
Rotation Flap Text: Given the location of the defect, inherent tension at the surgical site, and the proximity to free margins a rotation flap was deemed most appropriate for wound reconstruction. The risks, benefits, and possible outcomes of this procedure were discussed along with the risks, benefits, and possible outcomes of other options for wound repair (including but not limited to: complex closure, other flaps, grafts, and second intention). The patient verbalized understanding and consent to the outlined procedure. The patient verbalized understanding that intraoperative conditions may necessitate a change in the outlined procedure resulting in modification of the original surgical plan. This decision may be made at the discretion of the surgeon, and is due to factors subject to change or that are difficult to predict preoperatively. Using a sterile surgical marker, an appropriate rotation flap was drawn incorporating the defect and placing the expected incisions within the relaxed skin tension lines where possible. The area thus outlined was incised deep to adipose tissue with a #15 scalpel blade. The resulting defect was undermined widely and bilaterally in the appropriate surgical plane taking care to preserve local arteries, veins, nerves, and other structures of anatomic importance. This created a flap capable of rotating across the defect to close the wound under minimal tension. Hemostasis was obtained and then the flap was sutured together in layered fashion.
Bilateral Rotation Flap Text: The defect edges were debeveled with a #15 scalpel blade. Given the location of the defect, shape of the defect and the proximity to free margins a bilateral rotation flap was deemed most appropriate. Using a sterile surgical marker, an appropriate rotation flap was drawn incorporating the defect and placing the expected incisions within the relaxed skin tension lines where possible. The area thus outlined was incised deep to adipose tissue with a #15 scalpel blade. The skin margins were undermined to an appropriate distance in all directions utilizing iris scissors. Following this, the designed flap was carried over into the primary defect and sutured into place.
Spiral Flap Text: The defect edges were debeveled with a #15 scalpel blade.  Given the location of the defect, shape of the defect and the proximity to free margins a spiral flap was deemed most appropriate.  Using a sterile surgical marker, an appropriate rotation flap was drawn incorporating the defect and placing the expected incisions within the relaxed skin tension lines where possible. The area thus outlined was incised deep to adipose tissue with a #15 scalpel blade.  The skin margins were undermined to an appropriate distance in all directions utilizing iris scissors.
Staged Advancement Flap Text: The defect edges were debeveled with a #15 scalpel blade.  Given the location of the defect, shape of the defect and the proximity to free margins a staged advancement flap was deemed most appropriate.  Using a sterile surgical marker, an appropriate advancement flap was drawn incorporating the defect and placing the expected incisions within the relaxed skin tension lines where possible. The area thus outlined was incised deep to adipose tissue with a #15 scalpel blade.  The skin margins were undermined to an appropriate distance in all directions utilizing iris scissors.
Star Wedge Flap Text: The defect edges were debeveled with a #15 scalpel blade.  Given the location of the defect, shape of the defect and the proximity to free margins a star wedge flap was deemed most appropriate.  Using a sterile surgical marker, an appropriate rotation flap was drawn incorporating the defect and placing the expected incisions within the relaxed skin tension lines where possible. The area thus outlined was incised deep to adipose tissue with a #15 scalpel blade.  The skin margins were undermined to an appropriate distance in all directions utilizing iris scissors.
Transposition Flap Text: The defect edges were debeveled with a #15 scalpel blade.  Given the location of the defect and the proximity to free margins a transposition flap was deemed most appropriate.  Using a sterile surgical marker, an appropriate transposition flap was drawn incorporating the defect.    The area thus outlined was incised deep to adipose tissue with a #15 scalpel blade.  The skin margins were undermined to an appropriate distance in all directions utilizing iris scissors.
Muscle Hinge Flap Text: The defect edges were debeveled with a #15 scalpel blade.  Given the size, depth and location of the defect and the proximity to free margins a muscle hinge flap was deemed most appropriate.  Using a sterile surgical marker, an appropriate hinge flap was drawn incorporating the defect. The area thus outlined was incised with a #15 scalpel blade.  The skin margins were undermined to an appropriate distance in all directions utilizing iris scissors.
Mustarde Flap Text: The defect edges were debeveled with a #15 scalpel blade.  Given the size, depth and location of the defect and the proximity to free margins a Mustarde flap was deemed most appropriate.  Using a sterile surgical marker, an appropriate flap was drawn incorporating the defect. The area thus outlined was incised with a #15 scalpel blade.  The skin margins were undermined to an appropriate distance in all directions utilizing iris scissors.
Nasal Turnover Hinge Flap Text: The defect edges were debeveled with a #15 scalpel blade.  Given the size, depth, location of the defect and the defect being full thickness a nasal turnover hinge flap was deemed most appropriate.  Using a sterile surgical marker, an appropriate hinge flap was drawn incorporating the defect. The area thus outlined was incised with a #15 scalpel blade. The flap was designed to recreate the nasal mucosal lining and the alar rim. The skin margins were undermined to an appropriate distance in all directions utilizing iris scissors.
Nasalis-Muscle-Based Myocutaneous Island Pedicle Flap Text: Using a #15 blade, an incision was made around the donor flap to the level of the nasalis muscle. Wide lateral undermining was then performed in both the subcutaneous plane above the nasalis muscle, and in a submuscular plane just above periosteum. This allowed the formation of a free nasalis muscle axial pedicle (based on the angular artery) which was still attached to the actual cutaneous flap, increasing its mobility and vascular viability. Hemostasis was obtained with pinpoint electrocoagulation. The flap was mobilized into position and the pivotal anchor points positioned and stabilized with buried interrupted sutures. Subcutaneous and dermal tissues were closed in a multilayered fashion with sutures. Tissue redundancies were excised, and the epidermal edges were apposed without significant tension and sutured with sutures.
Orbicularis Oris Muscle Flap Text: The defect edges were debeveled with a #15 scalpel blade.  Given that the defect affected the competency of the oral sphincter an orbicularis oris muscle flap was deemed most appropriate to restore this competency and normal muscle function.  Using a sterile surgical marker, an appropriate flap was drawn incorporating the defect. The area thus outlined was incised with a #15 scalpel blade.
Melolabial Transposition Flap Text: Given the size/depth/location of the defect, inherent tension at the surgical site, and the proximity to free margins, a one stage nasolabial transposition flap was deemed most appropriate for wound reconstruction. The risks, benefits, and possible outcomes of this procedure were discussed along with the risks, benefits, and possible outcomes of other options for wound repair (including but not limited to: complex closure, other flaps, grafts, second intention, and combinations of these procedures). The patient verbalized understanding and consent to the outlined procedure. The patient verbalized understanding that intraoperative conditions may necessitate a change in the outlined procedure resulting in modification of the original surgical plan. This decision may be made at the discretion of the surgeon, and is due to factors subject to change or that are difficult to predict preoperatively. We also discussed complex defects involving multiple cosmetic subunits often necessitate several smaller planned surgical revisions spread out over the course of many months in order to restore appropriate tissue volume, cosmetic landmarks, and diminish scarring. In many cases anatomic “semblance” is possible, but complete parity is not. The patient understands that in some cases referral to additional specialists for additional procedures may be required. \\nUsing a sterile surgical marker, an appropriate nasolabial transposition flap was drawn incorporating the defect and placing the expected incisions within the relaxed skin tension lines where possible. The surgical site and surrounding skin were prepped and draped in sterile fashion. Anesthesia was checked and supplemented as necessary.  A single standing cone was removed along one border of the surgical defect. The flap and second standing cone were incised and raised from the opposite pole as the standing cone in the appropriate surgical plane. The entire flap and area surrounding the removed standing cones was undermined widely and bilaterally in the appropriate surgical plane taking care to preserve local arteries, veins, nerves, and other structures of anatomic importance. The area along the distal nasal portion of the post Mohs defect was undermined just enough to place deep buried vertical mattress sutures. This created a flap capable of transposing into the defect to drape the wound under minimal tension. Hemostasis was achieved. A single anchoring buried vertical mattress suture was placed to advance/transpose the flap into position without distortion of the surrounding free margins. The remaining flap was sutured in layered fashion.
Rhombic Flap Text: Given the location of the defect, inherent tension at the surgical site, and the proximity to free margins a rhombic transposition flap was deemed most appropriate for wound reconstruction. The risks, benefits, and possible outcomes of this procedure were discussed along with the risks, benefits, and possible outcomes of other options for wound repair (including but not limited to: complex closure, other flaps, grafts, and second intention). The patient verbalized understanding and consent to the outlined procedure. The patient verbalized understanding that intraoperative conditions may necessitate a change in the outlined procedure resulting in modification of the original surgical plan. This decision may be made at the discretion of the surgeon, and is due to factors subject to change or that are difficult to predict preoperatively. Using a sterile surgical marker, an appropriate rhombic transposition flap was drawn incorporating the defect and placing the expected incisions within the relaxed skin tension lines where possible. The surgical site and surrounding skin were prepped and draped in sterile fashion.  A single standing cone was removed at the expected pivot point of the transposition flap in the appropriate surgical plane, repositioning as necessary based upon local tension, proximity to free margins/other anatomic structures, and position of the relaxed skin tension lines. The flap (designed approximately 90 degrees from one surgical axis) was incised and raised in the appropriate surgical plane. The resulting defect was undermined widely and bilaterally in the appropriate surgical plane taking care to preserve local arteries, veins, nerves, and other structures of anatomic importance. This created a flap capable of transposing across the defect to close the wound under minimal tension, without distortion of adjacent free margins. Hemostasis was achieved and then the wound was sutured in layered fashion.
Bi-Rhombic Flap Text: The defect edges were debeveled with a #15 scalpel blade.  Given the location of the defect and the proximity to free margins a bi-rhombic flap was deemed most appropriate.  Using a sterile surgical marker, an appropriate rhombic flap was drawn incorporating the defect. The area thus outlined was incised deep to adipose tissue with a #15 scalpel blade.  The skin margins were undermined to an appropriate distance in all directions utilizing iris scissors.
Helical Rim Advancement Flap Text: Given the location of the defect, inherent tension at the surgical site, and the proximity to free margins a helical rim advancement flap was deemed most appropriate for wound reconstruction. The risks, benefits, and possible outcomes of this procedure were discussed along with the risks, benefits, and possible outcomes of other options for wound repair (including but not limited to: complex closure, other flaps, grafts, and second intention). The patient verbalized understanding and consent to the outlined procedure. The patient verbalized understanding that intraoperative conditions may necessitate a change in the outlined procedure resulting in modification of the original surgical plan. This decision may be made at the discretion of the surgeon, and is due to factors subject to change or that are difficult to predict preoperatively. Using a sterile surgical marker, an appropriate helical rim advancement flap was drawn incorporating the defect and placing the expected incisions within the relaxed skin tension lines where possible. The surgical site and surrounding skin were prepped and draped in sterile fashion. Anesthesia was checked and supplemented as necessary. A single standing cone was then excised posterior to the defect. A linear arc-like incision was removed at the anterior aspect of the defect and carried to a point of relative auricular skin laxity. A second dog ear was then removed at the end of this incision. The flap was undermined widely and bilaterally in the appropriate surgical plane. Hemostasis was obtained and then the flap was sutured together in layered fashion.
Bilateral Helical Rim Advancement Flap Text: The defect edges were debeveled with a #15 blade scalpel.  Given the location of the defect and the proximity to free margins (helical rim) a bilateral helical rim advancement flap was deemed most appropriate.  Using a sterile surgical marker, the appropriate advancement flaps were drawn incorporating the defect and placing the expected incisions between the helical rim and antihelix where possible.  The area thus outlined was incised through and through with a #15 scalpel blade.  With a skin hook and iris scissors, the flaps were gently and sharply undermined and freed up.
Ear Star Wedge Flap Text: The defect edges were debeveled with a #15 blade scalpel.  Given the location of the defect and the proximity to free margins (helical rim) an ear star wedge flap was deemed most appropriate.  Using a sterile surgical marker, the appropriate flap was drawn incorporating the defect and placing the expected incisions between the helical rim and antihelix where possible.  The area thus outlined was incised through and through with a #15 scalpel blade.
Banner Transposition Flap Text: The defect edges were debeveled with a #15 scalpel blade.  Given the location of the defect and the proximity to free margins a Banner transposition flap was deemed most appropriate.  Using a sterile surgical marker, an appropriate flap drawn around the defect. The area thus outlined was incised deep to adipose tissue with a #15 scalpel blade.  The skin margins were undermined to an appropriate distance in all directions utilizing iris scissors.
Bilobed Flap Text: Given the location of the defect, inherent tension at the surgical site, and the proximity to free margins a bilobe transposition flap was deemed most appropriate for wound reconstruction. The risks, benefits, and possible outcomes of this procedure were discussed along with the risks, benefits, and possible outcomes of other options for wound repair (including but not limited to: complex closure, other flaps, grafts, and second intention). The patient verbalized understanding and consent to the outlined procedure. The patient verbalized understanding that intraoperative conditions may necessitate a change in the outlined procedure resulting in modification of the original surgical plan. This decision may be made at the discretion of the surgeon, and is due to factors subject to change or that are difficult to predict preoperatively. We also discussed that should the patient heal with a scar they find noticeable/unfavorable additional surgical procedures and/or referral to additional specialists may be required. The patient understands the healing process takes place over many months and that the \"final\" appearance of the scar line often takes a year or longer to achieve.\\n\\nUsing a sterile surgical marker, an appropriate bilobed transposition flap was drawn incorporating the defect and placing the expected incisions within the relaxed skin tension lines where possible. The surgical site and surrounding skin were prepped and draped in sterile fashion.  A single standing cone was removed at the expected pivot point of the transposition flap in the appropriate surgical plane, repositioning as necessary based upon local tension, proximity to free margins/other anatomic structures, and position of the relaxed skin tension lines. The flap (containing two lobes  by 45 degrees, each proportionate in size to the postop defect) was incised and raised in the appropriate surgical plane. The resulting defect was undermined widely and bilaterally in the appropriate surgical plane taking care to preserve local arteries, veins, nerves, and other structures of anatomic importance. This created a flap capable of transposing across the defect to close the wound under minimal tension, without distortion of adjacent free margins. Hemostasis was achieved and then the wound was sutured in layered fashion.
Trilobed Flap Text: Given the location of the defect, inherent tension at the surgical site, and the proximity to free margins a trilobed transposition flap was deemed most appropriate for wound reconstruction. The risks, benefits, and possible outcomes of this procedure were discussed along with the risks, benefits, and possible outcomes of other options for wound repair (including but not limited to: complex closure, other flaps, grafts, and second intention). The patient verbalized understanding and consent to the outlined procedure. The patient verbalized understanding that intraoperative conditions may necessitate a change in the outlined procedure resulting in modification of the original surgical plan. This decision may be made at the discretion of the surgeon, and is due to factors subject to change or that are difficult to predict preoperatively. We also discussed that should the patient heal with a scar they find noticeable/unfavorable additional surgical procedures and/or referral to additional specialists may be required. The patient understands the healing process takes place over many months and that the \"final\" appearance of the scar line often takes a year or longer to achieve.\\nUsing a sterile surgical marker, an appropriate trilobe transposition flap was drawn incorporating the defect and placing the expected incisions within the relaxed skin tension lines where possible. The surgical site and surrounding skin were prepped and draped in sterile fashion.  A single standing cone was removed at the expected pivot point of the transposition flap in the appropriate surgical plane, repositioning as necessary based upon local tension, proximity to free margins/other anatomic structures, and position of the relaxed skin tension lines. The flap (containing three lobes  by 45 degrees, each proportionate in size to the postop defect) was incised and raised in the appropriate surgical plane. The resulting defect was undermined widely and bilaterally in the appropriate surgical plane taking care to preserve local arteries, veins, nerves, and other structures of anatomic importance. This created a flap capable of transposing across the defect to close the wound under minimal tension, without distortion of adjacent free margins. Hemostasis was achieved and then the wound was sutured in layered fashion.
Dorsal Nasal Flap Text: Given the location of the defect, inherent tension at the surgical site, and the proximity to free margins a dorsal nasal flap was deemed most appropriate for wound reconstruction. The risks, benefits, and possible outcomes of this procedure were discussed along with the risks, benefits, and possible outcomes of other options for wound repair (including but not limited to: complex closure, other flaps, grafts, and second intention). The patient verbalized understanding and consent to the outlined procedure. The patient verbalized understanding that intraoperative conditions may necessitate a change in the outlined procedure resulting in modification of the original surgical plan. This decision may be made at the discretion of the surgeon, and is due to factors subject to change or that are difficult to predict preoperatively. We also discussed that should the patient heal with a scar they find noticeable/unfavorable additional surgical procedures and/or referral to additional specialists may be required. The patient understands the healing process takes place over many months and that the \"final\" appearance of the scar line often takes a year or longer to achieve.\\nUsing a sterile surgical marker, an appropriate dorsal nasal flap was drawn incorporating the defect and placing the expected incisions within the relaxed skin tension lines where possible. The surgical site and surrounding skin were prepped and draped in sterile fashion.  The area thus outlined was incised in the appropriate surgical plane with a #15 scalpel blade. The resulting defect was undermined widely and bilaterally in the appropriate surgical plane taking care to preserve local arteries, veins, nerves, and other structures of anatomic importance. This created a flap capable of rotating across the defect to close the wound under minimal tension. Hemostasis was obtained and then the flap was sutured together in layered fashion.
Island Pedicle Flap With Canthal Suspension Text: The defect edges were debeveled with a #15 scalpel blade.  Given the location of the defect, shape of the defect and the proximity to free margins an island pedicle advancement flap was deemed most appropriate.  Using a sterile surgical marker, an appropriate advancement flap was drawn incorporating the defect, outlining the appropriate donor tissue and placing the expected incisions within the relaxed skin tension lines where possible. The area thus outlined was incised deep to adipose tissue with a #15 scalpel blade.  The skin margins were undermined to an appropriate distance in all directions around the primary defect and laterally outward around the island pedicle utilizing iris scissors.  There was minimal undermining beneath the pedicle flap. A suspension suture was placed in the canthal tendon to prevent tension and prevent ectropion.
Alar Island Pedicle Flap Text: The defect edges were debeveled with a #15 scalpel blade.  Given the location of the defect, shape of the defect and the proximity to the alar rim an island pedicle advancement flap was deemed most appropriate.  Using a sterile surgical marker, an appropriate advancement flap was drawn incorporating the defect, outlining the appropriate donor tissue and placing the expected incisions within the nasal ala running parallel to the alar rim. The area thus outlined was incised with a #15 scalpel blade.  The skin margins were undermined minimally to an appropriate distance in all directions around the primary defect and laterally outward around the island pedicle utilizing iris scissors.  There was minimal undermining beneath the pedicle flap.
Double Island Pedicle Flap Text: The defect edges were debeveled with a #15 scalpel blade.  Given the location of the defect, shape of the defect and the proximity to free margins a double island pedicle advancement flap was deemed most appropriate.  Using a sterile surgical marker, an appropriate advancement flap was drawn incorporating the defect, outlining the appropriate donor tissue and placing the expected incisions within the relaxed skin tension lines where possible.    The area thus outlined was incised deep to adipose tissue with a #15 scalpel blade.  The skin margins were undermined to an appropriate distance in all directions around the primary defect and laterally outward around the island pedicle utilizing iris scissors.  There was minimal undermining beneath the pedicle flap.
Island Pedicle Flap-Requiring Vessel Identification Text: The defect edges were debeveled with a #15 scalpel blade.  Given the location of the defect, shape of the defect and the proximity to free margins an island pedicle advancement flap was deemed most appropriate.  Using a sterile surgical marker, an appropriate advancement flap was drawn, based on the axial vessel mentioned above, incorporating the defect, outlining the appropriate donor tissue and placing the expected incisions within the relaxed skin tension lines where possible.    The area thus outlined was incised deep to adipose tissue with a #15 scalpel blade.  The skin margins were undermined to an appropriate distance in all directions around the primary defect and laterally outward around the island pedicle utilizing iris scissors.  There was minimal undermining beneath the pedicle flap.
Keystone Flap Text: The defect edges were debeveled with a #15 scalpel blade.  Given the location of the defect, shape of the defect a keystone flap was deemed most appropriate.  Using a sterile surgical marker, an appropriate keystone flap was drawn incorporating the defect, outlining the appropriate donor tissue and placing the expected incisions within the relaxed skin tension lines where possible. The area thus outlined was incised deep to adipose tissue with a #15 scalpel blade.  The skin margins were undermined to an appropriate distance in all directions around the primary defect and laterally outward around the flap utilizing iris scissors.
O-T Plasty Text: The defect edges were debeveled with a #15 scalpel blade.  Given the location of the defect, shape of the defect and the proximity to free margins an O-T plasty was deemed most appropriate.  Using a sterile surgical marker, an appropriate O-T plasty was drawn incorporating the defect and placing the expected incisions within the relaxed skin tension lines where possible.    The area thus outlined was incised deep to adipose tissue with a #15 scalpel blade.  The skin margins were undermined to an appropriate distance in all directions utilizing iris scissors.
O-Z Plasty Text: The defect edges were debeveled with a #15 scalpel blade.  Given the location of the defect, shape of the defect and the proximity to free margins an O-Z plasty (double transposition flap) was deemed most appropriate.  Using a sterile surgical marker, the appropriate transposition flaps were drawn incorporating the defect and placing the expected incisions within the relaxed skin tension lines where possible.    The area thus outlined was incised deep to adipose tissue with a #15 scalpel blade.  The skin margins were undermined to an appropriate distance in all directions utilizing iris scissors.  Hemostasis was achieved with electrocautery.  The flaps were then transposed into place, one clockwise and the other counterclockwise, and anchored with interrupted buried subcutaneous sutures.
Double O-Z Plasty Text: The defect edges were debeveled with a #15 scalpel blade.  Given the location of the defect, shape of the defect and the proximity to free margins a Double O-Z plasty (double transposition flap) was deemed most appropriate.  Using a sterile surgical marker, the appropriate transposition flaps were drawn incorporating the defect and placing the expected incisions within the relaxed skin tension lines where possible. The area thus outlined was incised deep to adipose tissue with a #15 scalpel blade.  The skin margins were undermined to an appropriate distance in all directions utilizing iris scissors.  Hemostasis was achieved with electrocautery.  The flaps were then transposed into place, one clockwise and the other counterclockwise, and anchored with interrupted buried subcutaneous sutures.
V-Y Plasty Text: The defect edges were debeveled with a #15 scalpel blade.  Given the location of the defect, shape of the defect and the proximity to free margins an V-Y advancement flap was deemed most appropriate.  Using a sterile surgical marker, an appropriate advancement flap was drawn incorporating the defect and placing the expected incisions within the relaxed skin tension lines where possible.    The area thus outlined was incised deep to adipose tissue with a #15 scalpel blade.  The skin margins were undermined to an appropriate distance in all directions utilizing iris scissors.
H Plasty Text: Given the location of the defect, shape of the defect and the proximity to free margins a H-plasty was deemed most appropriate for repair.  Using a sterile surgical marker, the appropriate advancement arms of the H-plasty were drawn incorporating the defect and placing the expected incisions within the relaxed skin tension lines where possible. The area thus outlined was incised deep to adipose tissue with a #15 scalpel blade. The skin margins were undermined to an appropriate distance in all directions utilizing iris scissors.  The opposing advancement arms were then advanced into place in opposite direction and anchored with interrupted buried subcutaneous sutures.
W Plasty Text: The lesion was extirpated to the level of the fat with a #15 scalpel blade.  Given the location of the defect, shape of the defect and the proximity to free margins a W-plasty was deemed most appropriate for repair.  Using a sterile surgical marker, the appropriate transposition arms of the W-plasty were drawn incorporating the defect and placing the expected incisions within the relaxed skin tension lines where possible.    The area thus outlined was incised deep to adipose tissue with a #15 scalpel blade.  The skin margins were undermined to an appropriate distance in all directions utilizing iris scissors.  The opposing transposition arms were then transposed into place in opposite direction and anchored with interrupted buried subcutaneous sutures.
Z Plasty Text: The lesion was extirpated to the level of the fat with a #15 scalpel blade.  Given the location of the defect, shape of the defect and the proximity to free margins a Z-plasty was deemed most appropriate for repair.  Using a sterile surgical marker, the appropriate transposition arms of the Z-plasty were drawn incorporating the defect and placing the expected incisions within the relaxed skin tension lines where possible.    The area thus outlined was incised deep to adipose tissue with a #15 scalpel blade.  The skin margins were undermined to an appropriate distance in all directions utilizing iris scissors.  The opposing transposition arms were then transposed into place in opposite direction and anchored with interrupted buried subcutaneous sutures.
Double Z Plasty Text: The lesion was extirpated to the level of the fat with a #15 scalpel blade. Given the location of the defect, shape of the defect and the proximity to free margins a double Z-plasty was deemed most appropriate for repair. Using a sterile surgical marker, the appropriate transposition arms of the double Z-plasty were drawn incorporating the defect and placing the expected incisions within the relaxed skin tension lines where possible. The area thus outlined was incised deep to adipose tissue with a #15 scalpel blade. The skin margins were undermined to an appropriate distance in all directions utilizing iris scissors. The opposing transposition arms were then transposed and carried over into place in opposite direction and anchored with interrupted buried subcutaneous sutures.
Zygomaticofacial Flap Text: Given the location of the defect, shape of the defect and the proximity to free margins a zygomaticofacial flap was deemed most appropriate for repair.  Using a sterile surgical marker, the appropriate flap was drawn incorporating the defect and placing the expected incisions within the relaxed skin tension lines where possible. The area thus outlined was incised deep to adipose tissue with a #15 scalpel blade with preservation of a vascular pedicle.  The skin margins were undermined to an appropriate distance in all directions utilizing iris scissors.  The flap was then placed into the defect and anchored with interrupted buried subcutaneous sutures.
Cheek Interpolation Flap Text: A decision was made to reconstruct the defect utilizing an interpolation axial flap and a staged reconstruction.  A telfa template was made of the defect.  This telfa template was then used to outline the Cheek Interpolation flap.  The donor area for the pedicle flap was then injected with anesthesia.  The flap was excised through the skin and subcutaneous tissue down to the layer of the underlying musculature.  The interpolation flap was carefully excised within this deep plane to maintain its blood supply.  The edges of the donor site were undermined.   The donor site was closed in a primary fashion.  The pedicle was then rotated into position and sutured.  Once the tube was sutured into place, adequate blood supply was confirmed with blanching and refill.  The pedicle was then wrapped with xeroform gauze and dressed appropriately with a telfa and gauze bandage to ensure continued blood supply and protect the attached pedicle.
Cheek-To-Nose Interpolation Flap Text: A decision was made to reconstruct the defect utilizing an interpolation axial flap and a staged reconstruction.  A telfa template was made of the defect.  This telfa template was then used to outline the Cheek-To-Nose Interpolation flap.  The donor area for the pedicle flap was then injected with anesthesia.  The flap was excised through the skin and subcutaneous tissue down to the layer of the underlying musculature.  The interpolation flap was carefully excised within this deep plane to maintain its blood supply.  The edges of the donor site were undermined.   The donor site was closed in a primary fashion.  The pedicle was then rotated into position and sutured.  Once the tube was sutured into place, adequate blood supply was confirmed with blanching and refill.  The pedicle was then wrapped with xeroform gauze and dressed appropriately with a telfa and gauze bandage to ensure continued blood supply and protect the attached pedicle.
Interpolation Flap Text: A decision was made to reconstruct the defect utilizing an interpolation axial flap and a staged reconstruction.  A telfa template was made of the defect.  This telfa template was then used to outline the interpolation flap.  The donor area for the pedicle flap was then injected with anesthesia.  The flap was excised through the skin and subcutaneous tissue down to the layer of the underlying musculature.  The interpolation flap was carefully excised within this deep plane to maintain its blood supply.  The edges of the donor site were undermined.   The donor site was closed in a primary fashion.  The pedicle was then rotated into position and sutured.  Once the tube was sutured into place, adequate blood supply was confirmed with blanching and refill.  The pedicle was then wrapped with xeroform gauze and dressed appropriately with a telfa and gauze bandage to ensure continued blood supply and protect the attached pedicle.
Melolabial Interpolation Flap Text: A decision was made to reconstruct the defect utilizing an interpolation axial flap and a staged reconstruction.  A telfa template was made of the defect.  This telfa template was then used to outline the melolabial interpolation flap.  The donor area for the pedicle flap was then injected with anesthesia.  The flap was excised through the skin and subcutaneous tissue down to the layer of the underlying musculature.  The pedicle flap was carefully excised within this deep plane to maintain its blood supply.  The edges of the donor site were undermined.   The donor site was closed in a primary fashion.  The pedicle was then rotated into position and sutured.  Once the tube was sutured into place, adequate blood supply was confirmed with blanching and refill.  The pedicle was then wrapped with xeroform gauze and dressed appropriately with a telfa and gauze bandage to ensure continued blood supply and protect the attached pedicle.
Mastoid Interpolation Flap Text: A decision was made to reconstruct the defect utilizing an interpolation axial flap and a staged reconstruction.  A telfa template was made of the defect.  This telfa template was then used to outline the mastoid interpolation flap.  The donor area for the pedicle flap was then injected with anesthesia.  The flap was excised through the skin and subcutaneous tissue down to the layer of the underlying musculature.  The pedicle flap was carefully excised within this deep plane to maintain its blood supply.  The edges of the donor site were undermined.   The donor site was closed in a primary fashion.  The pedicle was then rotated into position and sutured.  Once the tube was sutured into place, adequate blood supply was confirmed with blanching and refill.  The pedicle was then wrapped with xeroform gauze and dressed appropriately with a telfa and gauze bandage to ensure continued blood supply and protect the attached pedicle.
Posterior Auricular Interpolation Flap Text: A decision was made to reconstruct the defect utilizing an interpolation axial flap and a staged reconstruction.  A telfa template was made of the defect.  This telfa template was then used to outline the posterior auricular interpolation flap.  The donor area for the pedicle flap was then injected with anesthesia.  The flap was excised through the skin and subcutaneous tissue down to the layer of the underlying musculature.  The pedicle flap was carefully excised within this deep plane to maintain its blood supply.  The edges of the donor site were undermined.   The donor site was closed in a primary fashion.  The pedicle was then rotated into position and sutured.  Once the tube was sutured into place, adequate blood supply was confirmed with blanching and refill.  The pedicle was then wrapped with xeroform gauze and dressed appropriately with a telfa and gauze bandage to ensure continued blood supply and protect the attached pedicle.
Paramedian Forehead Flap Text: A decision was made to reconstruct the defect utilizing an interpolation axial flap and a staged reconstruction.  A telfa template was made of the defect.  This telfa template was then used to outline the paramedian forehead pedicle flap.  The donor area for the pedicle flap was then injected with anesthesia.  The flap was excised through the skin and subcutaneous tissue down to the layer of the underlying musculature.  The pedicle flap was carefully excised within this deep plane to maintain its blood supply.  The edges of the donor site were undermined.   The donor site was closed in a primary fashion.  The pedicle was then rotated into position and sutured.  Once the tube was sutured into place, adequate blood supply was confirmed with blanching and refill.  The pedicle was then wrapped with xeroform gauze and dressed appropriately with a telfa and gauze bandage to ensure continued blood supply and protect the attached pedicle.
Abbe Flap (Upper To Lower Lip) Text: The defect of the lower lip was assessed and measured.  Given the location and size of the defect, an Abbe flap was deemed most appropriate.  Using a sterile surgical marker, an appropriate Abbe flap was measured and drawn on the upper lip. Local anesthesia was then infiltrated.  A scalpel was then used to incise the upper lip through and through the skin, vermilion, muscle and mucosa, leaving the flap pedicled on the opposite side.  The flap was then rotated and transferred to the lower lip defect.  The flap was then sutured into place with a three layer technique, closing the orbicularis oris muscle layer with subcutaneous buried sutures, followed by a mucosal layer and an epidermal layer.
Abbe Flap (Lower To Upper Lip) Text: The defect of the upper lip was assessed and measured.  Given the location and size of the defect, an Abbe flap was deemed most appropriate.  Using a sterile surgical marker, an appropriate Abbe flap was measured and drawn on the lower lip. Local anesthesia was then infiltrated. A scalpel was then used to incise the upper lip through and through the skin, vermilion, muscle and mucosa, leaving the flap pedicled on the opposite side.  The flap was then rotated and transferred to the lower lip defect.  The flap was then sutured into place with a three layer technique, closing the orbicularis oris muscle layer with subcutaneous buried sutures, followed by a mucosal layer and an epidermal layer.
Estlander Flap (Upper To Lower Lip) Text: The defect of the lower lip was assessed and measured.  Given the location and size of the defect, an Estlander flap was deemed most appropriate.  Using a sterile surgical marker, an appropriate Estlander flap was measured and drawn on the upper lip. Local anesthesia was then infiltrated. A scalpel was then used to incise the lateral aspect of the flap, through skin, muscle and mucosa, leaving the flap pedicled medially.  The flap was then rotated and positioned to fill the lower lip defect.  The flap was then sutured into place with a three layer technique, closing the orbicularis oris muscle layer with subcutaneous buried sutures, followed by a mucosal layer and an epidermal layer.
Cheiloplasty (Less Than 50%) Text: A decision was made to reconstruct the defect with a  cheiloplasty.  The defect was undermined extensively.  Additional orbicularis oris muscle was excised with a 15 blade scalpel.  The defect was converted into a full thickness wedge, of less than 50% of the vertical height of the lip, to facilite a better cosmetic result.  Small vessels were then tied off with 5-0 monocyrl. The orbicularis oris, superficial fascia, adipose and dermis were then reapproximated.  After the deeper layers were approximated the epidermis was reapproximated with particular care given to realign the vermilion border.
Cheiloplasty (Complex) Text: A decision was made to reconstruct the defect with a  cheiloplasty.  The defect was undermined extensively.  Additional orbicularis oris muscle was excised with a 15 blade scalpel.  The defect was converted into a full thickness wedge to facilite a better cosmetic result.  Small vessels were then tied off with 5-0 monocyrl. The orbicularis oris, superficial fascia, adipose and dermis were then reapproximated.  After the deeper layers were approximated the epidermis was reapproximated with particular care given to realign the vermilion border.
Ear Wedge Repair Text: A wedge excision was completed by carrying down an excision through the full thickness of the ear and cartilage with an inward facing Burow's triangle. The wound was then closed in a layered fashion.
Full Thickness Lip Wedge Repair (Flap) Text: Given the location of the defect and the proximity to free margins a full thickness wedge repair was deemed most appropriate.  Using a sterile surgical marker, the appropriate repair was drawn incorporating the defect and placing the expected incisions perpendicular to the vermilion border.  The vermilion border was also meticulously outlined to ensure appropriate reapproximation during the repair.  The area thus outlined was incised through and through with a #15 scalpel blade.  The muscularis and dermis were reaproximated with deep sutures following hemostasis. Care was taken to realign the vermilion border before proceeding with the superficial closure.  Once the vermilion was realigned the superfical and mucosal closure was finished.
Ftsg Text: Given the location of the defect, shape of the defect and the proximity to free margins a full thickness skin graft was deemed most appropriate. The risks, benefits, and possible outcomes of this procedure were discussed along with the risks, benefits, and possible outcomes of other options for wound repair (including but not limited to: variations of complex closure, flaps, other types of grafts, and second intention). The patient verbalized understanding and consent to the outlined procedure. The patient understands should the surgical site heal in a manner they find unsatisfactory further interventions or referral to an additional specialist may be required. The graft donor and recipient site were prepped and draped in sterile fashion. Anesthesia was checked and supplemented as necessary. The primary defect shape was templated with sterile suture packing and transferred to the donor site. The area thus outlined was incised, along with planned standing tissue cones, deep to adipose tissue with a #15 scalpel blade.  The harvested graft was then trimmed of adipose tissue until only dermis and epidermis was left.  The skin margins of the secondary defect were undermined to an appropriate distance in all directions utilizing fine tipped surgical scissors. Hemostasis was achieved and then the secondary defect was sutured with interrupted buried subcutaneous sutures. The skin graft was then placed in the primary defect and oriented appropriately. The graft was sutured into place with simple interrupted and simple running cutaneous sutures.
Split-Thickness Skin Graft Text: The defect edges were debeveled with a #15 scalpel blade.  Given the location of the defect, shape of the defect and the proximity to free margins a split thickness skin graft was deemed most appropriate.  Using a sterile surgical marker, the primary defect shape was transferred to the donor site. The split thickness graft was then harvested.  The skin graft was then placed in the primary defect and oriented appropriately.
Pinch Graft Text: The defect edges were debeveled with a #15 scalpel blade. Given the location of the defect, shape of the defect and the proximity to free margins a pinch graft was deemed most appropriate. Using a sterile surgical marker, the primary defect shape was transferred to the donor site. The area thus outlined was incised deep to adipose tissue with a #15 scalpel blade.  The harvested graft was then trimmed of adipose tissue until only dermis and epidermis was left. The skin margins of the secondary defect were undermined to an appropriate distance in all directions utilizing iris scissors.  The secondary defect was closed with interrupted buried subcutaneous sutures.  The skin edges were then re-apposed with running  sutures.  The skin graft was then placed in the primary defect and oriented appropriately.
Burow's Graft Text: Given the location of the defect, shape of the defect, the proximity to free margins and the presence of a standing cone deformity a Burow's skin graft was deemed most appropriate. The risks, benefits, and possible outcomes of this procedure were discussed along with the risks, benefits, and possible outcomes of other options for wound repair (including but not limited to: variations of complex closure, flaps, other types of grafts, and second intention). The patient verbalized understanding and consent to the outlined procedure. The patient understands should the surgical site heal in a manner they find unsatisfactory further interventions or referral to an additional specialist may be required. The graft donor and recipient site were prepped and draped in sterile fashion. Anesthesia was checked and supplemented as necessary. The standing cone was removed and this tissue was then trimmed to the shape of the primary defect. The adipose tissue was also removed until only dermis and epidermis were left.  The skin margins of the secondary defect were undermined to an appropriate distance in all directions utilizing iris scissors.  The secondary defect was closed with interrupted buried subcutaneous sutures.  The skin graft was then placed in the primary defect and oriented appropriately. The graft was sutured into place with simple interrupted and simple running cutaneous sutures.
Cartilage Graft Text: The defect edges were debeveled with a #15 scalpel blade.  Given the location of the defect, shape of the defect, the fact the defect involved a full thickness cartilage defect a cartilage graft was deemed most appropriate.  An appropriate donor site was identified, cleansed, and anesthetized. The cartilage graft was then harvested and transferred to the recipient site, oriented appropriately and then sutured into place.  The secondary defect was then repaired using a primary closure.
Composite Graft Text: The defect edges were debeveled with a #15 scalpel blade.  Given the location of the defect, shape of the defect, the proximity to free margins and the fact the defect was full thickness a composite graft was deemed most appropriate.  The defect was outline and then transferred to the donor site.  A full thickness graft was then excised from the donor site. The graft was then placed in the primary defect, oriented appropriately and then sutured into place.  The secondary defect was then repaired using a primary closure.
Epidermal Autograft Text: The defect edges were debeveled with a #15 scalpel blade.  Given the location of the defect, shape of the defect and the proximity to free margins an epidermal autograft was deemed most appropriate.  Using a sterile surgical marker, the primary defect shape was transferred to the donor site. The epidermal graft was then harvested.  The skin graft was then placed in the primary defect and oriented appropriately.
Dermal Autograft Text: The defect edges were debeveled with a #15 scalpel blade.  Given the location of the defect, shape of the defect and the proximity to free margins a dermal autograft was deemed most appropriate.  Using a sterile surgical marker, the primary defect shape was transferred to the donor site. The area thus outlined was incised deep to adipose tissue with a #15 scalpel blade.  The harvested graft was then trimmed of adipose and epidermal tissue until only dermis was left.  The skin graft was then placed in the primary defect and oriented appropriately.
Skin Substitute Text: The defect edges were debeveled with a #15 scalpel blade.  Given the location of the defect, shape of the defect and the proximity to free margins a skin substitute graft was deemed most appropriate.  The graft material was trimmed to fit the size of the defect. The graft was then placed in the primary defect and oriented appropriately.
Tissue Cultured Epidermal Autograft Text: The defect edges were debeveled with a #15 scalpel blade.  Given the location of the defect, shape of the defect and the proximity to free margins a tissue cultured epidermal autograft was deemed most appropriate.  The graft was then trimmed to fit the size of the defect.  The graft was then placed in the primary defect and oriented appropriately.
Xenograft Text: The defect edges were debeveled with a #15 scalpel blade.  Given the location of the defect, shape of the defect and the proximity to free margins a xenograft was deemed most appropriate.  The graft was then trimmed to fit the size of the defect.  The graft was then placed in the primary defect and oriented appropriately.
Purse String (Simple) Text: Given the location of the defect and the characteristics of the surrounding skin a purse string closure was deemed most appropriate.  Undermining was performed circumferentially around the surgical defect.  A purse string suture was then placed and tightened.
Purse String (Intermediate) Text: Given the location of the defect and the characteristics of the surrounding skin a purse string intermediate closure was deemed most appropriate.  Undermining was performed circumferentially around the surgical defect.  A purse string suture was then placed and tightened.
Partial Purse String (Simple) Text: Given the location of the defect and the characteristics of the surrounding skin a simple purse string closure was deemed most appropriate.  Undermining was performed circumferentially around the surgical defect.  A purse string suture was then placed and tightened. Wound tension only allowed a partial closure of the circular defect.
Partial Purse String (Intermediate) Text: Given the location of the defect and the characteristics of the surrounding skin an intermediate purse string closure was deemed most appropriate.  Undermining was performed circumferentially around the surgical defect.  A purse string suture was then placed and tightened. Wound tension only allowed a partial closure of the circular defect.
Localized Dermabrasion With Wire Brush Text: The patient was draped in routine manner.  Localized dermabrasion using 3 x 17 mm wire brush was performed in routine manner to papillary dermis. This spot dermabrasion is being performed to complete skin cancer reconstruction. It also will eliminate the other sun damaged precancerous cells that are known to be part of the regional effect of a lifetime's worth of sun exposure. This localized dermabrasion is therapeutic and should not be considered cosmetic in any regard.
Tarsorrhaphy Text: A tarsorrhaphy was performed using Frost sutures.
Intermediate Repair And Flap Additional Text (Will Appearing After The Standard Complex Repair Text): The intermediate repair was not sufficient to completely close the primary defect. The remaining additional defect was repaired with the flap mentioned below.
Intermediate Repair And Graft Additional Text (Will Appearing After The Standard Complex Repair Text): The intermediate repair was not sufficient to completely close the primary defect. The remaining additional defect was repaired with the graft mentioned below.
Complex Repair And Flap Additional Text (Will Appearing After The Standard Complex Repair Text): The complex repair was not sufficient to completely close the primary defect. The remaining additional defect was repaired with the flap mentioned below.
Complex Repair And Graft Additional Text (Will Appearing After The Standard Complex Repair Text): The complex repair was not sufficient to completely close the primary defect. The remaining additional defect was repaired with the graft mentioned below.
Eyelid Full Thickness Repair - 32415: The eyelid defect was full thickness which required a wedge repair of the eyelid. Special care was taken to ensure that the eyelid margin was realligned when placing sutures.
Manual Repair Warning Statement: We plan on removing the manually selected variable below in favor of our much easier automatic structured text blocks found in the previous tab. We decided to do this to help make the flow better and give you the full power of structured data. Manual selection is never going to be ideal in our platform and I would encourage you to avoid using manual selection from this point on, especially since I will be sunsetting this feature. It is important that you do one of two things with the customized text below. First, you can save all of the text in a word file so you can have it for future reference. Second, transfer the text to the appropriate area in the Library tab. Lastly, if there is a flap or graft type which we do not have you need to let us know right away so I can add it in before the variable is hidden. No need to panic, we plan to give you roughly 6 months to make the change.
Same Histology In Subsequent Stages Text: The pattern and morphology of the tumor is as described in the first stage.
No Residual Tumor Seen Histology Text: There were no malignant cells seen in the sections examined.
Inflammation Suggestive Of Cancer Camouflage Histology Text: There was a dense lymphocytic infiltrate which prevented adequate histologic evaluation of adjacent structures.
Incidental Superficial Basal Cell Carcinoma Histology Text: One or more foci of “budding” basaloid tumor with peripheral palisading is attached superficially to the epidermis and/or adjacent superficial hair follicle(s) and is surrounded by a myxoid stroma.
Bcc Histology Text: There were numerous aggregates of basaloid cells.
Bcc Infiltrative Histology Text: Thin cords of basaloid tumor with angulated ends/spiky islands composed of few to many basaloid keratinocytes, embedded in a myxoid to sclerotic stroma. Retraction of the tumor islands away from the surrounding stroma is variable.
Bcc Micronodular Histology Text: Multiple small aggregates of basaloid cells are present within the dermis. These small nodules display variable subtle peripheral palisading and retraction artifact.
Bcc  Morpheaform/Sclerosing Histology Text: Thin cords of basaloid cells surrounded by a sclerotic collagenous stroma, with mostly absent peripheral palisading and stromal cleft formation.
Bcc  Nodular Histology Text: Discrete nests/nodules of malignant basaloid tumor is present within the dermis and/or attached to the epidermis. The tumor demonstrates palisading and is retracted away from a surrounding mucoid stroma.
Bcc  Nodulocystic Histology Text: Discrete nests/nodules of malignant basaloid tumor is present within the dermis and/or attached to the epidermis. The tumor demonstrates palisading and is retracted away from a surrounding mucoid stroma. Some areas of tumor show cystic degeneration.
Bcc Pigmented Histology Text: Discrete nests/nodules of malignant basaloid tumor is present within the dermis and/or attached to the epidermis. The tumor demonstrates palisading and is retracted away from a surrounding mucoid stroma. Some areas of tumor show deposition of melanin.
Bcc Superficial Histology Text: Multiple foci of “budding” basaloid tumor with peripheral palisading is attached superficially to the epidermis and/or adjacent superficial hair follicle(s) and is surrounded by a myxoid stroma.
Mixed Superficial And Nodular Bcc Histology Text: Histologic features of both superficial (Multiple foci of “budding” basaloid tumor with peripheral palisading is attached superficially to the epidermis and/or adjacent superficial hair follicle(s) and is surrounded by a myxoid stroma) AND nodular (Discrete nests/nodules of malignant basaloid tumor is present within the dermis and/or attached to the epidermis. The tumor demonstrates palisading and is retracted away from a surrounding mucoid stroma) basal cell carcinoma are present.
Mixed Nodular And Infiltrative Bcc Histology Text: Histologic features of both nodular (Discrete nests/nodules of malignant basaloid tumor is present within the dermis and/or attached to the epidermis. The tumor demonstrates palisading and is retracted away from a surrounding mucoid stroma) and infiltrative (Thin cords of basaloid tumor with angulated ends/spiky islands composed of few to many basaloid keratinocytes, embedded in a myxoid to sclerotic stroma. Retraction of the tumor islands away from the surrounding stroma is variable) basal cell carcinoma are present.
Mixed Nodular And Micronodular Bcc Histology Text: Histologic features of both nodular (Discrete nests/nodules of malignant basaloid tumor is present within the dermis and/or attached to the epidermis. The tumor demonstrates palisading and is retracted away from a surrounding mucoid stroma) and micronodular (Multiple small aggregates of basaloid cells are present within the dermis. These small nodules display variable subtle peripheral palisading and retraction artifact) basal cell carcinoma are present.
Metatypical Bcc Histology Text: areas of basaloid tumor with variable eosinophilic features and occasional apoptotic cells are visualized in the dermis. Peripheral palisading and stromal retraction is rare to absent.
Scc Histology Text: Full thickness keratinocyte atypia is observed microscopically. Focal dyskeratosis and apoptosis of keratinocytes is observed. The epidermis is acanthotic with growth of atypical keratinocytes beyond the level of the sebaceous glands.
Scc Moderately Differentiated Histology Text: Full thickness keratinocyte atypia is observed microscopically. Focal dyskeratosis and apoptosis of keratinocytes is observed. The epidermis is acanthotic with growth of atypical keratinocytes beyond the level of the sebaceous glands, many groups of keratinocytes display abnormal or absent keratin formation.
Scc Poorly Differentiated Histology Text: Full thickness keratinocyte atypia is observed microscopically. Focal dyskeratosis and apoptosis of keratinocytes is observed. The epidermis is acanthotic with growth of atypical keratinocytes beyond the level of the sebaceous glands, most keratinocytes show little to no keratinization.
Scc In Situ Histology Text: full thickness keratinocyte atypia is present within the epidermis with overlying loss of the granular cell layer and overlying parakeratosis.
Scc In Situ With Follicular Extension Histology Text: full thickness keratinocyte atypia is present within the epidermis with overlying loss of the granular cell layer and overlying parakeratosis. Adjacent follicular units are notably involved.
Mart-1 - Positive Histology Text: MART-1 staining demonstrates areas of higher density and clustering of melanocytes with Pagetoid spread upwards within the epidermis. The surgical margins are positive for tumor cells.
Mart-1 - Negative Histology Text: MART-1 staining demonstrates a normal density and pattern of melanocytes along the dermal-epidermal junction. The surgical margins are negative for tumor cells.
Information: Selecting Yes will display possible errors in your note based on the variables you have selected. This validation is only offered as a suggestion for you. PLEASE NOTE THAT THE VALIDATION TEXT WILL BE REMOVED WHEN YOU FINALIZE YOUR NOTE. IF YOU WANT TO FAX A PRELIMINARY NOTE YOU WILL NEED TO TOGGLE THIS TO 'NO' IF YOU DO NOT WANT IT IN YOUR FAXED NOTE.

## 2024-01-30 NOTE — PROCEDURE: MIPS QUALITY
Quality 226: Preventive Care And Screening: Tobacco Use: Screening And Cessation Intervention: Patient screened for tobacco use and is an ex/non-smoker
Quality 358: Patient-Centered Surgical Risk Assessment And Communication: Documentation of patient-specific risk assessment with a risk calculator based on multi-institutional clinical data, the specific risk calculator used, and communication of risk assessment from risk calculator with the patient or family.
Quality 111:Pneumonia Vaccination Status For Older Adults: Patient did not receive any pneumococcal conjugate or polysaccharide vaccine on or after their 60th birthday and before the end of the measurement period
Detail Level: Detailed
Quality 110: Preventive Care And Screening: Influenza Immunization: Influenza Immunization not Administered because Patient Refused.

## 2024-02-13 ENCOUNTER — APPOINTMENT (OUTPATIENT)
Dept: URBAN - METROPOLITAN AREA SURGERY 18 | Age: 79
Setting detail: DERMATOLOGY
End: 2024-02-14

## 2024-02-13 DIAGNOSIS — Z48.817 ENCOUNTER FOR SURGICAL AFTERCARE FOLLOWING SURGERY ON THE SKIN AND SUBCUTANEOUS TISSUE: ICD-10-CM

## 2024-02-13 PROCEDURE — OTHER MIPS QUALITY: OTHER

## 2024-02-13 PROCEDURE — 99024 POSTOP FOLLOW-UP VISIT: CPT

## 2024-02-13 PROCEDURE — OTHER POST-OP WOUND CHECK: OTHER

## 2024-02-13 ASSESSMENT — LOCATION DETAILED DESCRIPTION DERM
LOCATION DETAILED: RIGHT MEDIAL PROXIMAL PRETIBIAL REGION
LOCATION DETAILED: RIGHT LATERAL TEMPLE

## 2024-02-13 ASSESSMENT — LOCATION SIMPLE DESCRIPTION DERM
LOCATION SIMPLE: RIGHT PRETIBIAL REGION
LOCATION SIMPLE: RIGHT TEMPLE

## 2024-02-13 ASSESSMENT — LOCATION ZONE DERM
LOCATION ZONE: FACE
LOCATION ZONE: LEG

## 2024-02-13 NOTE — PROCEDURE: POST-OP WOUND CHECK
Detail Level: Detailed
Add 76891 Cpt? (Important Note: In 2017 The Use Of 19753 Is Being Tracked By Cms To Determine Future Global Period Reimbursement For Global Periods): yes
Wound Evaluated By: Dr. Edmar Mora
Additional Comments: Patient presents for 2 week wound check and suture removal. Site well approximated. Patient okay to resume activity, beginning at 50% duration and 50% intensity. Patient can resume full activity in 2 more weeks. Patient no longer needs a bandage, but reminded of the importance of sunscreen as well as sun protection to the area. Patient to begin gentle massage to the site beginning at 5 weeks post-op. Follow-up as needed.

## 2024-02-27 ENCOUNTER — APPOINTMENT (OUTPATIENT)
Dept: URBAN - METROPOLITAN AREA SURGERY 18 | Age: 79
Setting detail: DERMATOLOGY
End: 2024-02-28

## 2024-02-27 VITALS — DIASTOLIC BLOOD PRESSURE: 71 MMHG | HEART RATE: 75 BPM | SYSTOLIC BLOOD PRESSURE: 130 MMHG

## 2024-02-27 PROBLEM — C44.622 SQUAMOUS CELL CARCINOMA OF SKIN OF RIGHT UPPER LIMB, INCLUDING SHOULDER: Status: ACTIVE | Noted: 2024-02-27

## 2024-02-27 PROCEDURE — OTHER MIPS QUALITY: OTHER

## 2024-02-27 PROCEDURE — OTHER MOHS SURGERY: OTHER

## 2024-02-27 PROCEDURE — 13121 CMPLX RPR S/A/L 2.6-7.5 CM: CPT | Mod: 79

## 2024-02-27 PROCEDURE — OTHER COUNSELING: OTHER

## 2024-02-27 PROCEDURE — 17313 MOHS 1 STAGE T/A/L: CPT | Mod: 79

## 2024-02-27 NOTE — PROCEDURE: MOHS SURGERY
Mohs Case Number: 
Date Of Previous Biopsy (Optional): 10/04/2023
Previous Accession (Optional): [id number]
Biopsy Photograph Reviewed: Yes
Referring Physician (Optional): Valerie Mora MD
Consent Type: Consent 1 (Standard)
Eye Shield Used: No
Surgeon Performing Repair (Optional): Dr. Edmar Mora
Initial Size Of Lesion: 2.4
X Size Of Lesion In Cm (Optional): 1
Primary Defect Length In Cm (Final Defect Size - Required For Flaps/Grafts): 3
Primary Defect Width In Cm (Final Defect Size - Required For Flaps/Grafts): 1.7
Repair Type: Complex Repair
Which Eyelid Repair Cpt Are You Using?: 34720
Oculoplastic Surgeon Procedure Text (A): After obtaining clear surgical margins the patient was sent to oculoplastics for surgical repair.  The patient understands they will receive post-surgical care and follow-up from the referring physician's office.
Otolaryngologist Procedure Text (A): After obtaining clear surgical margins the patient was sent to otolaryngology for surgical repair.  The patient understands they will receive post-surgical care and follow-up from the referring physician's office.
Plastic Surgeon Procedure Text (A): After obtaining clear surgical margins the patient was sent to plastics for surgical repair.  The patient understands they will receive post-surgical care and follow-up from the referring physician's office.
Mid-Level Procedure Text (A): After obtaining clear surgical margins the patient was sent to a mid-level provider for surgical repair.  The patient understands they will receive post-surgical care and follow-up from the mid-level provider.
Provider Procedure Text (A): After obtaining clear surgical margins the defect was repaired by another provider.
Asc Procedure Text (A): After obtaining clear surgical margins the patient was sent to an ASC for surgical repair.  The patient understands they will receive post-surgical care and follow-up from the ASC physician.
Simple / Intermediate / Complex Repair - Final Wound Length In Cm: 4.1
Suturegard Retention Suture: 2-0 Nylon
Retention Suture Bite Size: 3 mm
Length To Time In Minutes Device Was In Place: 10
Distance Of Undermining In Cm (Required): 1.9
Undermining Type: Entire Wound
Debridement Text: The wound edges were debrided prior to proceeding with the closure to facilitate wound healing.
Helical Rim Text: The closure involved the helical rim.
Vermilion Border Text: The closure involved the vermilion border.
Nostril Rim Text: The closure involved the nostril rim.
Retention Suture Text: Retention sutures were placed to support the closure and prevent dehiscence.
Secondary Defect Length In Cm (Required For Flaps): 0
Location Indication Override (Is Already Calculated Based On Selected Body Location): Area L
Area H Indication Text: Tumors in this location are included in Area H (eyelids, eyebrows, nose, lips, chin, ear, pre-auricular, post-auricular, temple, genitalia, hands, feet, ankles and areola).  Tissue conservation is critical in these anatomic locations.
Area M Indication Text: Tumors in this location are included in Area M (cheek, forehead, scalp, neck, jawline and pretibial skin).  Mohs surgery is indicated for tumors in these anatomic locations.
Area L Indication Text: Tumors in this location are included in Area L (trunk and extremities).  Mohs surgery is indicated for larger tumors, or tumors with aggressive histologic features, in these anatomic locations.
Tumor Debulked?: scalpel
Depth Of Tumor Invasion (For Histology): tumor not visualized
Perineural Invasion (For Histology - Be Specific If Possible): absent
Stage 1: Number Of Blocks?: 2
Special Stains Stage 1 - Results: Base On Clearance Noted Above
Stage 2: Additional Anesthesia Type: 1% lidocaine with 1:100,000 epinephrine and 408mcg clindamycin/ml and a 1:10 solution of 8.4% sodium bicarbonate
Staging Info: By selecting yes to the question above you will include information on AJCC 8 tumor staging in your Mohs note. Information on tumor staging will be automatically added for SCCs on the head and neck. AJCC 8 includes tumor size, tumor depth, perineural involvement and bone invasion.
Tumor Depth: Less than 6mm from granular layer and no invasion beyond the subcutaneous fat
Was The Patient On Physician Recommended Anticoagulation Therapy?: Please Select the Appropriate Response
Medical Necessity Statement: Based on my medical judgement, Mohs surgery is the most appropriate treatment for this cancer compared to other treatments.
Alternatives Discussed Intro (Do Not Add Period): I discussed alternative treatments to Mohs surgery and specifically discussed the risks and benefits of
Consent 1/Introductory Paragraph: The rationale for Mohs was explained to the patient and consent was obtained. The risks, benefits and alternatives to therapy were discussed in detail. Specifically, the risks of infection, scarring, bleeding, prolonged wound healing, incomplete removal, allergy to anesthesia, nerve injury and recurrence were addressed. Prior to the procedure, the treatment site was clearly identified and confirmed by the patient. All components of Universal Protocol/PAUSE Rule completed.
Consent 2/Introductory Paragraph: Mohs surgery was explained to the patient and consent was obtained. The risks, benefits and alternatives to therapy were discussed in detail. Specifically, the risks of infection, scarring, bleeding, prolonged wound healing, incomplete removal, allergy to anesthesia, nerve injury and recurrence were addressed. Prior to the procedure, the treatment site was clearly identified and confirmed by the patient. All components of Universal Protocol/PAUSE Rule completed.
Consent 3/Introductory Paragraph: I gave the patient a chance to ask questions they had about the procedure.  Following this I explained the Mohs procedure and consent was obtained. The risks, benefits and alternatives to therapy were discussed in detail. Specifically, the risks of infection, scarring, bleeding, prolonged wound healing, incomplete removal, allergy to anesthesia, nerve injury and recurrence were addressed. Prior to the procedure, the treatment site was clearly identified and confirmed by the patient. All components of Universal Protocol/PAUSE Rule completed.
Consent (Temporal Branch)/Introductory Paragraph: The rationale for Mohs was explained to the patient and consent was obtained. The risks, benefits and alternatives to therapy were discussed in detail. Specifically, the risks of damage to the temporal branch of the facial nerve, infection, scarring, bleeding, prolonged wound healing, incomplete removal, allergy to anesthesia, and recurrence were addressed. Prior to the procedure, the treatment site was clearly identified and confirmed by the patient. All components of Universal Protocol/PAUSE Rule completed.
Consent (Marginal Mandibular)/Introductory Paragraph: The rationale for Mohs was explained to the patient and consent was obtained. The risks, benefits and alternatives to therapy were discussed in detail. Specifically, the risks of damage to the marginal mandibular branch of the facial nerve, infection, scarring, bleeding, prolonged wound healing, incomplete removal, allergy to anesthesia, and recurrence were addressed. Prior to the procedure, the treatment site was clearly identified and confirmed by the patient. All components of Universal Protocol/PAUSE Rule completed.
Consent (Spinal Accessory)/Introductory Paragraph: The rationale for Mohs was explained to the patient and consent was obtained. The risks, benefits and alternatives to therapy were discussed in detail. Specifically, the risks of damage to the spinal accessory nerve, infection, scarring, bleeding, prolonged wound healing, incomplete removal, allergy to anesthesia, and recurrence were addressed. Prior to the procedure, the treatment site was clearly identified and confirmed by the patient. All components of Universal Protocol/PAUSE Rule completed.
Consent (Near Eyelid Margin)/Introductory Paragraph: The rationale for Mohs was explained to the patient and consent was obtained. The risks, benefits and alternatives to therapy were discussed in detail. Specifically, the risks of ectropion or eyelid deformity, infection, scarring, bleeding, prolonged wound healing, incomplete removal, allergy to anesthesia, nerve injury and recurrence were addressed. Prior to the procedure, the treatment site was clearly identified and confirmed by the patient. All components of Universal Protocol/PAUSE Rule completed.
Consent (Ear)/Introductory Paragraph: The rationale for Mohs was explained to the patient and consent was obtained. The risks, benefits and alternatives to therapy were discussed in detail. Specifically, the risks of ear deformity, infection, scarring, bleeding, prolonged wound healing, incomplete removal, allergy to anesthesia, nerve injury and recurrence were addressed. Prior to the procedure, the treatment site was clearly identified and confirmed by the patient. All components of Universal Protocol/PAUSE Rule completed.
Consent (Nose)/Introductory Paragraph: The rationale for Mohs was explained to the patient and consent was obtained. The risks, benefits and alternatives to therapy were discussed in detail. Specifically, the risks of nasal deformity, changes in the flow of air through the nose, infection, scarring, bleeding, prolonged wound healing, incomplete removal, allergy to anesthesia, nerve injury and recurrence were addressed. Prior to the procedure, the treatment site was clearly identified and confirmed by the patient. All components of Universal Protocol/PAUSE Rule completed.
Consent (Lip)/Introductory Paragraph: The rationale for Mohs was explained to the patient and consent was obtained. The risks, benefits and alternatives to therapy were discussed in detail. Specifically, the risks of lip deformity, changes in the oral aperture, infection, scarring, bleeding, prolonged wound healing, incomplete removal, allergy to anesthesia, nerve injury and recurrence were addressed. Prior to the procedure, the treatment site was clearly identified and confirmed by the patient. All components of Universal Protocol/PAUSE Rule completed.
Consent (Scalp)/Introductory Paragraph: The rationale for Mohs was explained to the patient and consent was obtained. The risks, benefits and alternatives to therapy were discussed in detail. Specifically, the risks of changes in hair growth pattern secondary to repair, infection, scarring, bleeding, prolonged wound healing, incomplete removal, allergy to anesthesia, nerve injury and recurrence were addressed. Prior to the procedure, the treatment site was clearly identified and confirmed by the patient. All components of Universal Protocol/PAUSE Rule completed.
Detail Level: Detailed
Postop Diagnosis: same
Anesthesia Volume In Cc: 6
Hemostasis: Electrodesiccation
Estimated Blood Loss (Cc): minimal
Stage 8: Additional Anesthesia Type: 1% lidocaine with epinephrine
Repair Anesthesia Method: local infiltration
Brow Lift Text: A midfrontal incision was made medially to the defect to allow access to the tissues just superior to the left eyebrow. Following careful dissection inferiorly in a supraperiosteal plane to the level of the left eyebrow, several 3-0 monocryl sutures were used to resuspend the eyebrow orbicularis oculi muscular unit to the superior frontal bone periosteum. This resulted in an appropriate reapproximation of static eyebrow symmetry and correction of the left brow ptosis.
Deep Sutures: 3-0 PGCL
Epidermal Sutures: 5-0 Chromic Gut
Epidermal Closure: running cuticular
Suturegard Intro: Intraoperative tissue expansion was performed, utilizing the SUTUREGARD device, in order to reduce wound tension.
Suturegard Body: The suture ends were repeatedly re-tightened and re-clamped to achieve the desired tissue expansion.
Hemigard Intro: Due to skin fragility and wound tension, it was decided to use HEMIGARD adhesive retention suture devices to permit a linear closure. The skin was cleaned and dried for a 6cm distance away from the wound. Excessive hair, if present, was removed to allow for adhesion.
Hemigard Postcare Instructions: The HEMIGARD strips are to remain completely dry for at least 5-7 days.
Donor Site Anesthesia Type: same as repair anesthesia
Graft Basting Suture (Optional): 5-0 Fast Absorbing Gut
Graft Donor Site Dermal Sutures (Optional): 4-0 Monocryl
Epidermal Closure Graft Donor Site (Optional): simple interrupted
Graft Donor Site Bandage (Optional-Leave Blank If You Don't Want In Note): Vaseline, telfa pad, and hypoallergenic tape were applied to the donor site.
Closure 2 Information: This tab is for additional flaps and grafts, including complex repair and grafts and complex repair and flaps. You can also specify a different location for the additional defect, if the location is the same you do not need to select a new one. We will insert the automated text for the repair you select below just as we do for solitary flaps and grafts. Please note that at this time if you select a location with a different insurance zone you will need to override the ICD10 and CPT if appropriate.
Closure 3 Information: This tab is for additional flaps and grafts above and beyond our usual structured repairs.  Please note if you enter information here it will not currently bill and you will need to add the billing information manually.
Wound Care: Petrolatum
Dressing: waterproof dressing and pressure dressing
Dressing (No Sutures): dry sterile dressing
Unna Boot Text: An Unna boot was placed to help immobilize the limb and facilitate more rapid healing.
Home Suture Removal Text: Patient was provided instructions on removing sutures and will remove their sutures at home.  If they have any questions or difficulties they will call the office.
Post-Care Instructions: I reviewed with the patient in detail post-care instructions. Patient is not to engage in any heavy lifting, exercise, or swimming for the next 14 days. Should the patient develop any fevers, chills, bleeding, severe pain patient will contact the office immediately.
Pain Refusal Text: I offered to prescribe pain medication but the patient refused to take this medication.
Mauc Instructions: By selecting yes to the question below the MAUC number will be added into the note.  This will be calculated automatically based on the diagnosis chosen, the size entered, the body zone selected (H,M,L) and the specific indications you chose. You will also have the option to override the Mohs AUC if you disagree with the automatically calculated number and this option is found in the Case Summary tab.
Where Do You Want The Question To Include Opioid Counseling Located?: Case Summary Tab
Eye Protection Verbiage: Before proceeding with the stage, a plastic scleral shield was inserted. The globe was anesthetized with a few drops of 1% lidocaine with 1:100,000 epinephrine. Then, an appropriate sized scleral shield was chosen and coated with lacrilube ointment. The shield was gently inserted and left in place for the duration of each stage. After the stage was completed, the shield was gently removed.
Mohs Method Verbiage: An incision at a 45 degree angle following the standard Mohs approach was done and the specimen was harvested as a microscopic controlled layer.
Surgeon/Pathologist Verbiage (Will Incorporate Name Of Surgeon From Intro If Not Blank): operated in two distinct and integrated capacities as the surgeon and pathologist.
Mohs Histo Method Verbiage: Each section was then chromacoded and processed in the Mohs lab using the Mohs protocol and submitted for frozen section.
Subsequent Stages Histo Method Verbiage: Using a similar technique to that described above, a thin layer of tissue was removed from all areas where tumor was visible on the previous stage.  The tissue was again oriented, mapped, dyed, and processed as above.
Mohs Rapid Report Verbiage: The area of clinically evident tumor was marked with skin marking ink and appropriately hatched.  The initial incision was made following the Mohs approach through the skin.  The specimen was taken to the lab, divided into the necessary number of pieces, chromacoded and processed according to the Mohs protocol.  This was repeated in successive stages until a tumor free defect was achieved.
Complex Repair Preamble Text (Leave Blank If You Do Not Want): The risks, benefits, and possible outcomes of this procedure were discussed along with the risks, benefits, and possible outcomes of other options for wound repair (including but not limited to: variations of complex closure, flaps, grafts, and second intention). The patient verbalized understanding and consent to the outlined procedure. The patient understands should the surgical site heal in a manner they find unsatisfactory further interventions or referral to an additional specialist may be required. Following sterile preparation and drape, two standing cones were removed at opposite ends of the postoperative defect within, or parallel to, the relaxed skin tension lines. The resulting defect was undermined widely and bilaterally in the appropriate surgical plane taking care to preserve local arteries, veins, nerves, and other structures of anatomic importance. Hemostasis was achieved and then the wound was sutured together in layered fashion.
Intermediate Repair Preamble Text (Leave Blank If You Do Not Want): The risks, benefits, and possible outcomes of this procedure were discussed along with the risks, benefits, and possible outcomes of other options for wound repair (including but not limited to: variations of complex closure, flaps, grafts, and second intention). The patient verbalized understanding and consent to the outlined procedure. The patient understands should the surgical site heal in a manner they find unsatisfactory further interventions or referral to an additional specialist may be required. Following sterile preparation and drape, two standing cones were removed at opposite ends of the postoperative defect (as needed) within, or parallel to, the relaxed skin tension lines. The resulting defect was undermined, as necessary, in the appropriate surgical plane taking care to preserve local arteries, veins, nerves, and other structures of anatomic importance. Hemostasis was achieved and then the wound was sutured together in layered fashion.
Crescentic Complex Repair Preamble Text (Leave Blank If You Do Not Want): Extensive wide undermining was performed.
Crescentic Intermediate Repair Preamble Text (Leave Blank If You Do Not Want): Undermining was performed with blunt dissection.
Non-Graft Cartilage Fenestration Text: The cartilage was fenestrated with a 2mm punch biopsy to help facilitate healing.
Graft Cartilage Fenestration Text: The cartilage was fenestrated with a 2mm punch biopsy to help facilitate graft survival and healing.
Secondary Intention Text (Leave Blank If You Do Not Want): Given the location of the defect, depth of the postoperative wound and inherent tension at the surgical site, healing by secondary intention was deemed most appropriate for wound closure. The risks, benefits, and possible outcomes of this procedure were discussed along with the risks, benefits, and possible outcomes of other options for wound repair (including but not limited to: complex closure, flaps, and grafts). The patient verbalized understanding and voiced a preference for secondary intention over reconstruction. The patient understands that not all healing occurs in predictable fashion or within a predictable time period. If the resulting scar is unsatisfactory, affects/distorts a free margin or other adjacent structures, or is slow to heal the patient understands further surgical intervention or even referral to an additional specialist may be required.
No Repair - Repaired With Adjacent Surgical Defect Text (Leave Blank If You Do Not Want): After obtaining clear surgical margins the defect was repaired concurrently with another surgical defect which was in close approximation.
Adjacent Tissue Transfer Text: The defect edges were debeveled with a #15 scalpel blade.  Given the location of the defect and the proximity to free margins an adjacent tissue transfer was deemed most appropriate.  Using a sterile surgical marker, an appropriate flap was drawn incorporating the defect and placing the expected incisions within the relaxed skin tension lines where possible.    The area thus outlined was incised deep to adipose tissue with a #15 scalpel blade.  The skin margins were undermined to an appropriate distance in all directions utilizing iris scissors.
Advancement Flap (Single) Text: Given the location of the defect, inherent tension at the surgical site, and the proximity to free margins an advancement flap was deemed most appropriate for wound reconstruction. The risks, benefits, and possible outcomes of this procedure were discussed along with the risks, benefits, and possible outcomes of other options for wound repair (including but not limited to: complex closure, other flaps, grafts, and second intention). The patient verbalized understanding and consent to the outlined procedure. The patient verbalized understanding that intraoperative conditions may necessitate a change in the outlined procedure resulting in modification of the original surgical plan. This decision may be made at the discretion of the surgeon, and is due to factors subject to change or that are difficult to predict preoperatively. Using a sterile surgical marker, an appropriate advancement flap was drawn incorporating the defect and placing the expected incisions within the relaxed skin tension lines where possible. The surgical site and surrounding skin were prepped and draped in sterile fashion.  Standing cones were removed from opposite ends of the defect within the appropriate surgical plane, repositioning as necessary based upon local tension, proximity to free margins/other anatomic structures, and position of the relaxed skin tension lines. The resulting defect was undermined widely and bilaterally in the appropriate surgical plane taking care to preserve local arteries, veins, nerves, and other structures of anatomic importance. This created a sliding flap capable of advancing across the defect to close the wound under minimal tension. Hemostasis was achieved and then the wound was sutured in layered fashion.
Advancement Flap (Double) Text: Given the location of the defect, inherent tension at the surgical site, and the proximity to free margins a double (bilateral) advancement flap was deemed most appropriate for wound reconstruction. The risks, benefits, and possible outcomes of this procedure were discussed along with the risks, benefits, and possible outcomes of other options for wound repair (including but not limited to: complex closure, other flaps, grafts, and second intention). The patient verbalized understanding and consent to the outlined procedure. The patient verbalized understanding that intraoperative conditions may necessitate a change in the outlined procedure resulting in modification of the original surgical plan. This decision may be made at the discretion of the surgeon, and is due to factors subject to change or that are difficult to predict preoperatively. Using a sterile surgical marker, an appropriate bilateral advancement flap was drawn incorporating the defect and placing the expected incisions within the relaxed skin tension lines where possible. The surgical site and surrounding skin were prepped and draped in sterile fashion.  Standing cones were removed from opposite ends of the defect within the appropriate surgical plane, repositioning as necessary based upon local tension, proximity to free margins/other anatomic structures, and position of the relaxed skin tension lines. The resulting defect was undermined widely and bilaterally in the appropriate surgical plane taking care to preserve local arteries, veins, nerves, and other structures of anatomic importance. This created a sliding flap capable of advancing bilaterally across the defect to close the wound under minimal tension. Hemostasis was achieved and then the wound was sutured in layered fashion.
Burow's Advancement Flap Text: The defect edges were debeveled with a #15 scalpel blade.  Given the location of the defect and the proximity to free margins a Burow's advancement flap was deemed most appropriate.  Using a sterile surgical marker, the appropriate advancement flap was drawn incorporating the defect and placing the expected incisions within the relaxed skin tension lines where possible.    The area thus outlined was incised deep to adipose tissue with a #15 scalpel blade.  The skin margins were undermined to an appropriate distance in all directions utilizing iris scissors.
Chonodrocutaneous Helical Advancement Flap Text: Given the location of the defect, inherent tension at the surgical site, and the proximity to free margins a Burow’s advancement flap was deemed most appropriate for wound reconstruction. The risks, benefits, and possible outcomes of this procedure were discussed along with the risks, benefits, and possible outcomes of other options for wound repair (including but not limited to: complex closure, other flaps, grafts, and second intention). The patient verbalized understanding and consent to the outlined procedure. The patient verbalized understanding that intraoperative conditions may necessitate a change in the outlined procedure resulting in modification of the original surgical plan. This decision may be made at the discretion of the surgeon, and is due to factors subject to change or that are difficult to predict preoperatively. Using a sterile surgical marker, an appropriate Burow’s advancement flap was drawn incorporating the defect and placing the expected incisions within the relaxed skin tension lines where possible. The surgical site and surrounding skin were prepped and draped in sterile fashion.  Standing cones were removed from opposite ends of the defect within the appropriate surgical plane, repositioning as necessary based upon local tension, proximity to free margins/other anatomic structures, and position of the relaxed skin tension lines. The position of one dog ear was modified, moving the standing cone along a lateral plane to lie in a more favorable location for closure.  The resulting defect was undermined widely and bilaterally in the appropriate surgical plane taking care to preserve local arteries, veins, nerves, and other structures of anatomic importance. This created a sliding flap capable of advancing across the defect to close the wound under minimal tension. Hemostasis was achieved and then the wound was sutured in layered fashion.
Crescentic Advancement Flap Text: Given the location of the defect, inherent tension at the surgical site, and the proximity to free margins a Crescentic advancement flap was deemed most appropriate for wound reconstruction. The risks, benefits, and possible outcomes of this procedure were discussed along with the risks, benefits, and possible outcomes of other options for wound repair (including but not limited to: complex closure, other flaps, grafts, and second intention). The patient verbalized understanding and consent to the outlined procedure. The patient verbalized understanding that intraoperative conditions may necessitate a change in the outlined procedure resulting in modification of the original surgical plan. This decision may be made at the discretion of the surgeon, and is due to factors subject to change or that are difficult to predict preoperatively. Using a sterile surgical marker, an appropriate Crescentic advancement flap was drawn incorporating the defect and placing the expected incisions within the relaxed skin tension lines where possible. The surgical site and surrounding skin were prepped and draped in sterile fashion.  Standing cones were removed from opposite ends of the defect within the appropriate surgical plane, repositioning as necessary based upon local tension, proximity to free margins/other anatomic structures, and position of the relaxed skin tension lines. The shape of one standing cone was modified, designed in a crescentic shape to allow for sliding movement in both the horizontal and vertical plane.  The resulting defect was undermined widely and bilaterally in the appropriate surgical plane taking care to preserve local arteries, veins, nerves, and other structures of anatomic importance. This created a sliding flap capable of advancing across the defect to close the wound under minimal tension. Hemostasis was achieved and then the wound was sutured in layered fashion.
A-T Advancement Flap Text: Given the location of the defect, inherent tension at the surgical site, and the proximity to free margins an A to T advancement flap was deemed most appropriate for wound reconstruction. The risks, benefits, and possible outcomes of this procedure were discussed along with the risks, benefits, and possible outcomes of other options for wound repair (including but not limited to: complex closure, other flaps, grafts, and second intention). The patient verbalized understanding and consent to the outlined procedure. The patient verbalized understanding that intraoperative conditions may necessitate a change in the outlined procedure resulting in modification of the original surgical plan. This decision may be made at the discretion of the surgeon, and is due to factors subject to change or that are difficult to predict preoperatively. Using a sterile surgical marker, an appropriate A to T advancement flap was drawn incorporating the defect and placing the expected incisions within the relaxed skin tension lines where possible. The surgical site and surrounding skin were prepped and draped in sterile fashion.  Standing cones were removed from opposite ends of the defect within the appropriate surgical plane, repositioning as necessary based upon local tension, proximity to free margins/other anatomic structures, and position of the relaxed skin tension lines. The shape of one standing cone was modified, taking two smaller standing cones (each approximately half the size of the traditional standing cone) and repositioning them along opposite ends of a lateral plane (with respect to the defect).  The resulting defect was undermined widely and bilaterally in the appropriate surgical plane taking care to preserve local arteries, veins, nerves, and other structures of anatomic importance. This created a sliding flap capable of advancing across the defect to close the wound under minimal tension. Hemostasis was achieved and then the wound was sutured in layered fashion.
O-T Advancement Flap Text: The defect edges were debeveled with a #15 scalpel blade.  Given the location of the defect, shape of the defect and the proximity to free margins an O-T advancement flap was deemed most appropriate.  Using a sterile surgical marker, an appropriate advancement flap was drawn incorporating the defect and placing the expected incisions within the relaxed skin tension lines where possible.    The area thus outlined was incised deep to adipose tissue with a #15 scalpel blade.  The skin margins were undermined to an appropriate distance in all directions utilizing iris scissors.
O-L Flap Text: The defect edges were debeveled with a #15 scalpel blade.  Given the location of the defect, shape of the defect and the proximity to free margins an O-L flap was deemed most appropriate.  Using a sterile surgical marker, an appropriate advancement flap was drawn incorporating the defect and placing the expected incisions within the relaxed skin tension lines where possible.    The area thus outlined was incised deep to adipose tissue with a #15 scalpel blade.  The skin margins were undermined to an appropriate distance in all directions utilizing iris scissors.
O-Z Flap Text: The defect edges were debeveled with a #15 scalpel blade.  Given the location of the defect, shape of the defect and the proximity to free margins an O-Z flap was deemed most appropriate.  Using a sterile surgical marker, an appropriate transposition flap was drawn incorporating the defect and placing the expected incisions within the relaxed skin tension lines where possible. The area thus outlined was incised deep to adipose tissue with a #15 scalpel blade.  The skin margins were undermined to an appropriate distance in all directions utilizing iris scissors.
Double O-Z Flap Text: The defect edges were debeveled with a #15 scalpel blade.  Given the location of the defect, shape of the defect and the proximity to free margins a Double O-Z flap was deemed most appropriate.  Using a sterile surgical marker, an appropriate transposition flap was drawn incorporating the defect and placing the expected incisions within the relaxed skin tension lines where possible. The area thus outlined was incised deep to adipose tissue with a #15 scalpel blade.  The skin margins were undermined to an appropriate distance in all directions utilizing iris scissors.
V-Y Flap Text: Given the location of the defect, inherent tension at the surgical site, and the proximity to free margins an island pedicle advancement flap was deemed most appropriate for wound reconstruction. The risks, benefits, and possible outcomes of this procedure were discussed along with the risks, benefits, and possible outcomes of other options for wound repair (including but not limited to: complex closure, other flaps, grafts, and second intention). The patient verbalized understanding and consent to the outlined procedure. The patient verbalized understanding that intraoperative conditions may necessitate a change in the outlined procedure resulting in modification of the original surgical plan. This decision may be made at the discretion of the surgeon, and is due to factors subject to change or that are difficult to predict preoperatively. \\n\\nUsing a sterile surgical marker, an appropriate island pedicle advancement flap was drawn incorporating the defect and placing the expected incisions within the relaxed skin tension lines where possible. The surgical site and surrounding skin were prepped and draped in sterile fashion.  The island pedicle advancement flap was incised and undermined within the appropriate surgical plane, creating a mobile flap supported by a central vascular pedicle.  The wound edges surrounding the flap and primary defect were undermined widely and bilaterally in the appropriate surgical plane taking care to preserve local arteries, veins, nerves, and other structures of anatomic importance. This created a sliding flap capable of advancing across the defect to close the wound under minimal tension. Hemostasis was achieved and then the wound was sutured in layered fashion.
Advancement-Rotation Flap Text: The defect edges were debeveled with a #15 scalpel blade.  Given the location of the defect, shape of the defect and the proximity to free margins an advancement-rotation flap was deemed most appropriate.  Using a sterile surgical marker, an appropriate flap was drawn incorporating the defect and placing the expected incisions within the relaxed skin tension lines where possible. The area thus outlined was incised deep to adipose tissue with a #15 scalpel blade.  The skin margins were undermined to an appropriate distance in all directions utilizing iris scissors.
Mercedes Flap Text: Given the location of the defect, inherent tension at the surgical site, and the proximity to free margins a Mercedes (Tripolar) Advancement Flap was deemed most appropriate for wound reconstruction. The risks, benefits, and possible outcomes of this procedure were discussed along with the risks, benefits, and possible outcomes of other options for wound repair (including but not limited to: complex closure, other flaps, grafts, and second intention). The patient verbalized understanding and consent to the outlined procedure. The patient verbalized understanding that intraoperative conditions may necessitate a change in the outlined procedure resulting in modification of the original surgical plan. This decision may be made at the discretion of the surgeon, and is due to factors subject to change or that are difficult to predict preoperatively. Using a sterile surgical marker, an appropriate tripolar advancement flap was drawn incorporating the defect and placing the expected incisions within the relaxed skin tension lines where possible. The surgical site and surrounding skin were prepped and draped in sterile fashion.  Anesthesia was checked and supplemented as necessary. Standing cones were removed from three “poles” of the defect within the appropriate surgical plane, repositioning as necessary based upon local tension, proximity to free margins/other anatomic structures, and position of the relaxed skin tension lines. The resulting defect was undermined widely and bilaterally in the appropriate surgical plane taking care to preserve local arteries, veins, nerves, and other structures of anatomic importance. This created a sliding flap capable of advancing across the defect from three reservoirs to close the wound under minimal tension. Hemostasis was achieved and then the wound was sutured in layered fashion.
Modified Advancement Flap Text: Given the location of the defect, inherent tension at the surgical site, and the proximity to free margins a modified advancement flap was deemed most appropriate for wound reconstruction. The risks, benefits, and possible outcomes of this procedure were discussed along with the risks, benefits, and possible outcomes of other options for wound repair (including but not limited to: complex closure, other flaps, grafts, and second intention). The patient verbalized understanding and consent to the outlined procedure. The patient verbalized understanding that intraoperative conditions may necessitate a change in the outlined procedure resulting in modification of the original surgical plan. This decision may be made at the discretion of the surgeon, and is due to factors subject to change or that are difficult to predict preoperatively. Using a sterile surgical marker, an appropriate advancement flap was drawn incorporating the defect and placing the expected incisions within the relaxed skin tension lines where possible.\\n\\nThe surgical site and surrounding skin were prepped and draped in sterile fashion.  Standing cones were removed from opposite ends of the defect within the appropriate surgical plane, repositioning as necessary based upon local tension, proximity to free margins/other anatomic structures, and position of the relaxed skin tension lines. The resulting defect was undermined widely and bilaterally in the appropriate surgical plane taking care to preserve local arteries, veins, nerves, and other structures of anatomic importance. This created a sliding flap capable of advancing across the defect to close the wound under minimal tension. Hemostasis was achieved and then the wound was sutured in layered fashion.
Mucosal Advancement Flap Text: Given the location of the defect, shape of the defect and the proximity to free margins a mucosal advancement flap was deemed most appropriate. Incisions were made with a 15 blade scalpel in the appropriate fashion along the cutaneous vermilion border and the mucosal lip. The remaining actinically damaged mucosal tissue was excised.  The mucosal advancement flap was then elevated to the gingival sulcus with care taken to preserve the neurovascular structures and advanced into the primary defect. Care was taken to ensure that precise realignment of the vermilion border was achieved.
Peng Advancement Flap Text: The defect edges were debeveled with a #15 scalpel blade.  Given the location of the defect, shape of the defect and the proximity to free margins a Peng advancement flap was deemed most appropriate.  Using a sterile surgical marker, an appropriate advancement flap was drawn incorporating the defect and placing the expected incisions within the relaxed skin tension lines where possible. The area thus outlined was incised deep to adipose tissue with a #15 scalpel blade.  The skin margins were undermined to an appropriate distance in all directions utilizing iris scissors.
Hatchet Flap Text: The defect edges were debeveled with a #15 scalpel blade.  Given the location of the defect, shape of the defect and the proximity to free margins a hatchet flap was deemed most appropriate.  Using a sterile surgical marker, an appropriate hatchet flap was drawn incorporating the defect and placing the expected incisions within the relaxed skin tension lines where possible.    The area thus outlined was incised deep to adipose tissue with a #15 scalpel blade.  The skin margins were undermined to an appropriate distance in all directions utilizing iris scissors.
Rotation Flap Text: Given the location of the defect, inherent tension at the surgical site, and the proximity to free margins a rotation flap was deemed most appropriate for wound reconstruction. The risks, benefits, and possible outcomes of this procedure were discussed along with the risks, benefits, and possible outcomes of other options for wound repair (including but not limited to: complex closure, other flaps, grafts, and second intention). The patient verbalized understanding and consent to the outlined procedure. The patient verbalized understanding that intraoperative conditions may necessitate a change in the outlined procedure resulting in modification of the original surgical plan. This decision may be made at the discretion of the surgeon, and is due to factors subject to change or that are difficult to predict preoperatively. Using a sterile surgical marker, an appropriate rotation flap was drawn incorporating the defect and placing the expected incisions within the relaxed skin tension lines where possible. The area thus outlined was incised deep to adipose tissue with a #15 scalpel blade. The resulting defect was undermined widely and bilaterally in the appropriate surgical plane taking care to preserve local arteries, veins, nerves, and other structures of anatomic importance. This created a flap capable of rotating across the defect to close the wound under minimal tension. Hemostasis was obtained and then the flap was sutured together in layered fashion.
Bilateral Rotation Flap Text: The defect edges were debeveled with a #15 scalpel blade. Given the location of the defect, shape of the defect and the proximity to free margins a bilateral rotation flap was deemed most appropriate. Using a sterile surgical marker, an appropriate rotation flap was drawn incorporating the defect and placing the expected incisions within the relaxed skin tension lines where possible. The area thus outlined was incised deep to adipose tissue with a #15 scalpel blade. The skin margins were undermined to an appropriate distance in all directions utilizing iris scissors. Following this, the designed flap was carried over into the primary defect and sutured into place.
Spiral Flap Text: The defect edges were debeveled with a #15 scalpel blade.  Given the location of the defect, shape of the defect and the proximity to free margins a spiral flap was deemed most appropriate.  Using a sterile surgical marker, an appropriate rotation flap was drawn incorporating the defect and placing the expected incisions within the relaxed skin tension lines where possible. The area thus outlined was incised deep to adipose tissue with a #15 scalpel blade.  The skin margins were undermined to an appropriate distance in all directions utilizing iris scissors.
Staged Advancement Flap Text: The defect edges were debeveled with a #15 scalpel blade.  Given the location of the defect, shape of the defect and the proximity to free margins a staged advancement flap was deemed most appropriate.  Using a sterile surgical marker, an appropriate advancement flap was drawn incorporating the defect and placing the expected incisions within the relaxed skin tension lines where possible. The area thus outlined was incised deep to adipose tissue with a #15 scalpel blade.  The skin margins were undermined to an appropriate distance in all directions utilizing iris scissors.
Star Wedge Flap Text: The defect edges were debeveled with a #15 scalpel blade.  Given the location of the defect, shape of the defect and the proximity to free margins a star wedge flap was deemed most appropriate.  Using a sterile surgical marker, an appropriate rotation flap was drawn incorporating the defect and placing the expected incisions within the relaxed skin tension lines where possible. The area thus outlined was incised deep to adipose tissue with a #15 scalpel blade.  The skin margins were undermined to an appropriate distance in all directions utilizing iris scissors.
Transposition Flap Text: The defect edges were debeveled with a #15 scalpel blade.  Given the location of the defect and the proximity to free margins a transposition flap was deemed most appropriate.  Using a sterile surgical marker, an appropriate transposition flap was drawn incorporating the defect.    The area thus outlined was incised deep to adipose tissue with a #15 scalpel blade.  The skin margins were undermined to an appropriate distance in all directions utilizing iris scissors.
Muscle Hinge Flap Text: The defect edges were debeveled with a #15 scalpel blade.  Given the size, depth and location of the defect and the proximity to free margins a muscle hinge flap was deemed most appropriate.  Using a sterile surgical marker, an appropriate hinge flap was drawn incorporating the defect. The area thus outlined was incised with a #15 scalpel blade.  The skin margins were undermined to an appropriate distance in all directions utilizing iris scissors.
Mustarde Flap Text: The defect edges were debeveled with a #15 scalpel blade.  Given the size, depth and location of the defect and the proximity to free margins a Mustarde flap was deemed most appropriate.  Using a sterile surgical marker, an appropriate flap was drawn incorporating the defect. The area thus outlined was incised with a #15 scalpel blade.  The skin margins were undermined to an appropriate distance in all directions utilizing iris scissors.
Nasal Turnover Hinge Flap Text: The defect edges were debeveled with a #15 scalpel blade.  Given the size, depth, location of the defect and the defect being full thickness a nasal turnover hinge flap was deemed most appropriate.  Using a sterile surgical marker, an appropriate hinge flap was drawn incorporating the defect. The area thus outlined was incised with a #15 scalpel blade. The flap was designed to recreate the nasal mucosal lining and the alar rim. The skin margins were undermined to an appropriate distance in all directions utilizing iris scissors.
Nasalis-Muscle-Based Myocutaneous Island Pedicle Flap Text: Using a #15 blade, an incision was made around the donor flap to the level of the nasalis muscle. Wide lateral undermining was then performed in both the subcutaneous plane above the nasalis muscle, and in a submuscular plane just above periosteum. This allowed the formation of a free nasalis muscle axial pedicle (based on the angular artery) which was still attached to the actual cutaneous flap, increasing its mobility and vascular viability. Hemostasis was obtained with pinpoint electrocoagulation. The flap was mobilized into position and the pivotal anchor points positioned and stabilized with buried interrupted sutures. Subcutaneous and dermal tissues were closed in a multilayered fashion with sutures. Tissue redundancies were excised, and the epidermal edges were apposed without significant tension and sutured with sutures.
Nasalis Myocutaneous Flap Text: Using a #15 blade, an incision was made around the donor flap to the level of the nasalis muscle. Wide lateral undermining was then performed in both the subcutaneous plane above the nasalis muscle, and in a submuscular plane just above periosteum. This allowed the formation of a free nasalis muscle axial pedicle which was still attached to the actual cutaneous flap, increasing its mobility and vascular viability. Hemostasis was obtained with pinpoint electrocoagulation. The flap was mobilized into position and the pivotal anchor points positioned and stabilized with buried interrupted sutures. Subcutaneous and dermal tissues were closed in a multilayered fashion with sutures. Tissue redundancies were excised, and the epidermal edges were apposed without significant tension and sutured with sutures.
Orbicularis Oris Muscle Flap Text: The defect edges were debeveled with a #15 scalpel blade.  Given that the defect affected the competency of the oral sphincter an orbicularis oris muscle flap was deemed most appropriate to restore this competency and normal muscle function.  Using a sterile surgical marker, an appropriate flap was drawn incorporating the defect. The area thus outlined was incised with a #15 scalpel blade.
Melolabial Transposition Flap Text: Given the size/depth/location of the defect, inherent tension at the surgical site, and the proximity to free margins, a one stage nasolabial transposition flap was deemed most appropriate for wound reconstruction. The risks, benefits, and possible outcomes of this procedure were discussed along with the risks, benefits, and possible outcomes of other options for wound repair (including but not limited to: complex closure, other flaps, grafts, second intention, and combinations of these procedures). The patient verbalized understanding and consent to the outlined procedure. The patient verbalized understanding that intraoperative conditions may necessitate a change in the outlined procedure resulting in modification of the original surgical plan. This decision may be made at the discretion of the surgeon, and is due to factors subject to change or that are difficult to predict preoperatively. We also discussed complex defects involving multiple cosmetic subunits often necessitate several smaller planned surgical revisions spread out over the course of many months in order to restore appropriate tissue volume, cosmetic landmarks, and diminish scarring. In many cases anatomic “semblance” is possible, but complete parity is not. The patient understands that in some cases referral to additional specialists for additional procedures may be required. \\nUsing a sterile surgical marker, an appropriate nasolabial transposition flap was drawn incorporating the defect and placing the expected incisions within the relaxed skin tension lines where possible. The surgical site and surrounding skin were prepped and draped in sterile fashion. Anesthesia was checked and supplemented as necessary.  A single standing cone was removed along one border of the surgical defect. The flap and second standing cone were incised and raised from the opposite pole as the standing cone in the appropriate surgical plane. The entire flap and area surrounding the removed standing cones was undermined widely and bilaterally in the appropriate surgical plane taking care to preserve local arteries, veins, nerves, and other structures of anatomic importance. The area along the distal nasal portion of the post Mohs defect was undermined just enough to place deep buried vertical mattress sutures. This created a flap capable of transposing into the defect to drape the wound under minimal tension. Hemostasis was achieved. A single anchoring buried vertical mattress suture was placed to advance/transpose the flap into position without distortion of the surrounding free margins. The remaining flap was sutured in layered fashion.
Rectangular Flap Text: The defect edges were debeveled with a #15 scalpel blade. Given the location of the defect and the proximity to free margins a rectangular flap was deemed most appropriate. Using a sterile surgical marker, an appropriate rectangular flap was drawn incorporating the defect. The area thus outlined was incised deep to adipose tissue with a #15 scalpel blade. The skin margins were undermined to an appropriate distance in all directions utilizing iris scissors. Following this, the designed flap was carried over into the primary defect and sutured into place.
Rhombic Flap Text: Given the location of the defect, inherent tension at the surgical site, and the proximity to free margins a rhombic transposition flap was deemed most appropriate for wound reconstruction. The risks, benefits, and possible outcomes of this procedure were discussed along with the risks, benefits, and possible outcomes of other options for wound repair (including but not limited to: complex closure, other flaps, grafts, and second intention). The patient verbalized understanding and consent to the outlined procedure. The patient verbalized understanding that intraoperative conditions may necessitate a change in the outlined procedure resulting in modification of the original surgical plan. This decision may be made at the discretion of the surgeon, and is due to factors subject to change or that are difficult to predict preoperatively. Using a sterile surgical marker, an appropriate rhombic transposition flap was drawn incorporating the defect and placing the expected incisions within the relaxed skin tension lines where possible. The surgical site and surrounding skin were prepped and draped in sterile fashion.  A single standing cone was removed at the expected pivot point of the transposition flap in the appropriate surgical plane, repositioning as necessary based upon local tension, proximity to free margins/other anatomic structures, and position of the relaxed skin tension lines. The flap (designed approximately 90 degrees from one surgical axis) was incised and raised in the appropriate surgical plane. The resulting defect was undermined widely and bilaterally in the appropriate surgical plane taking care to preserve local arteries, veins, nerves, and other structures of anatomic importance. This created a flap capable of transposing across the defect to close the wound under minimal tension, without distortion of adjacent free margins. Hemostasis was achieved and then the wound was sutured in layered fashion.
Bi-Rhombic Flap Text: The defect edges were debeveled with a #15 scalpel blade.  Given the location of the defect and the proximity to free margins a bi-rhombic flap was deemed most appropriate.  Using a sterile surgical marker, an appropriate rhombic flap was drawn incorporating the defect. The area thus outlined was incised deep to adipose tissue with a #15 scalpel blade.  The skin margins were undermined to an appropriate distance in all directions utilizing iris scissors.
Helical Rim Advancement Flap Text: Given the location of the defect, inherent tension at the surgical site, and the proximity to free margins a helical rim advancement flap was deemed most appropriate for wound reconstruction. The risks, benefits, and possible outcomes of this procedure were discussed along with the risks, benefits, and possible outcomes of other options for wound repair (including but not limited to: complex closure, other flaps, grafts, and second intention). The patient verbalized understanding and consent to the outlined procedure. The patient verbalized understanding that intraoperative conditions may necessitate a change in the outlined procedure resulting in modification of the original surgical plan. This decision may be made at the discretion of the surgeon, and is due to factors subject to change or that are difficult to predict preoperatively. Using a sterile surgical marker, an appropriate helical rim advancement flap was drawn incorporating the defect and placing the expected incisions within the relaxed skin tension lines where possible. The surgical site and surrounding skin were prepped and draped in sterile fashion. Anesthesia was checked and supplemented as necessary. A single standing cone was then excised posterior to the defect. A linear arc-like incision was removed at the anterior aspect of the defect and carried to a point of relative auricular skin laxity. A second dog ear was then removed at the end of this incision. The flap was undermined widely and bilaterally in the appropriate surgical plane. Hemostasis was obtained and then the flap was sutured together in layered fashion.
Bilateral Helical Rim Advancement Flap Text: The defect edges were debeveled with a #15 blade scalpel.  Given the location of the defect and the proximity to free margins (helical rim) a bilateral helical rim advancement flap was deemed most appropriate.  Using a sterile surgical marker, the appropriate advancement flaps were drawn incorporating the defect and placing the expected incisions between the helical rim and antihelix where possible.  The area thus outlined was incised through and through with a #15 scalpel blade.  With a skin hook and iris scissors, the flaps were gently and sharply undermined and freed up.
Ear Star Wedge Flap Text: The defect edges were debeveled with a #15 blade scalpel.  Given the location of the defect and the proximity to free margins (helical rim) an ear star wedge flap was deemed most appropriate.  Using a sterile surgical marker, the appropriate flap was drawn incorporating the defect and placing the expected incisions between the helical rim and antihelix where possible.  The area thus outlined was incised through and through with a #15 scalpel blade.
Banner Transposition Flap Text: The defect edges were debeveled with a #15 scalpel blade.  Given the location of the defect and the proximity to free margins a Banner transposition flap was deemed most appropriate.  Using a sterile surgical marker, an appropriate flap drawn around the defect. The area thus outlined was incised deep to adipose tissue with a #15 scalpel blade.  The skin margins were undermined to an appropriate distance in all directions utilizing iris scissors.
Bilobed Flap Text: Given the location of the defect, inherent tension at the surgical site, and the proximity to free margins a bilobe transposition flap was deemed most appropriate for wound reconstruction. The risks, benefits, and possible outcomes of this procedure were discussed along with the risks, benefits, and possible outcomes of other options for wound repair (including but not limited to: complex closure, other flaps, grafts, and second intention). The patient verbalized understanding and consent to the outlined procedure. The patient verbalized understanding that intraoperative conditions may necessitate a change in the outlined procedure resulting in modification of the original surgical plan. This decision may be made at the discretion of the surgeon, and is due to factors subject to change or that are difficult to predict preoperatively. We also discussed that should the patient heal with a scar they find noticeable/unfavorable additional surgical procedures and/or referral to additional specialists may be required. The patient understands the healing process takes place over many months and that the \"final\" appearance of the scar line often takes a year or longer to achieve.\\n\\nUsing a sterile surgical marker, an appropriate bilobed transposition flap was drawn incorporating the defect and placing the expected incisions within the relaxed skin tension lines where possible. The surgical site and surrounding skin were prepped and draped in sterile fashion.  A single standing cone was removed at the expected pivot point of the transposition flap in the appropriate surgical plane, repositioning as necessary based upon local tension, proximity to free margins/other anatomic structures, and position of the relaxed skin tension lines. The flap (containing two lobes  by 45 degrees, each proportionate in size to the postop defect) was incised and raised in the appropriate surgical plane. The resulting defect was undermined widely and bilaterally in the appropriate surgical plane taking care to preserve local arteries, veins, nerves, and other structures of anatomic importance. This created a flap capable of transposing across the defect to close the wound under minimal tension, without distortion of adjacent free margins. Hemostasis was achieved and then the wound was sutured in layered fashion.
Trilobed Flap Text: Given the location of the defect, inherent tension at the surgical site, and the proximity to free margins a trilobed transposition flap was deemed most appropriate for wound reconstruction. The risks, benefits, and possible outcomes of this procedure were discussed along with the risks, benefits, and possible outcomes of other options for wound repair (including but not limited to: complex closure, other flaps, grafts, and second intention). The patient verbalized understanding and consent to the outlined procedure. The patient verbalized understanding that intraoperative conditions may necessitate a change in the outlined procedure resulting in modification of the original surgical plan. This decision may be made at the discretion of the surgeon, and is due to factors subject to change or that are difficult to predict preoperatively. We also discussed that should the patient heal with a scar they find noticeable/unfavorable additional surgical procedures and/or referral to additional specialists may be required. The patient understands the healing process takes place over many months and that the \"final\" appearance of the scar line often takes a year or longer to achieve.\\nUsing a sterile surgical marker, an appropriate trilobe transposition flap was drawn incorporating the defect and placing the expected incisions within the relaxed skin tension lines where possible. The surgical site and surrounding skin were prepped and draped in sterile fashion.  A single standing cone was removed at the expected pivot point of the transposition flap in the appropriate surgical plane, repositioning as necessary based upon local tension, proximity to free margins/other anatomic structures, and position of the relaxed skin tension lines. The flap (containing three lobes  by 45 degrees, each proportionate in size to the postop defect) was incised and raised in the appropriate surgical plane. The resulting defect was undermined widely and bilaterally in the appropriate surgical plane taking care to preserve local arteries, veins, nerves, and other structures of anatomic importance. This created a flap capable of transposing across the defect to close the wound under minimal tension, without distortion of adjacent free margins. Hemostasis was achieved and then the wound was sutured in layered fashion.
Dorsal Nasal Flap Text: Given the location of the defect, inherent tension at the surgical site, and the proximity to free margins a dorsal nasal flap was deemed most appropriate for wound reconstruction. The risks, benefits, and possible outcomes of this procedure were discussed along with the risks, benefits, and possible outcomes of other options for wound repair (including but not limited to: complex closure, other flaps, grafts, and second intention). The patient verbalized understanding and consent to the outlined procedure. The patient verbalized understanding that intraoperative conditions may necessitate a change in the outlined procedure resulting in modification of the original surgical plan. This decision may be made at the discretion of the surgeon, and is due to factors subject to change or that are difficult to predict preoperatively. We also discussed that should the patient heal with a scar they find noticeable/unfavorable additional surgical procedures and/or referral to additional specialists may be required. The patient understands the healing process takes place over many months and that the \"final\" appearance of the scar line often takes a year or longer to achieve.\\nUsing a sterile surgical marker, an appropriate dorsal nasal flap was drawn incorporating the defect and placing the expected incisions within the relaxed skin tension lines where possible. The surgical site and surrounding skin were prepped and draped in sterile fashion.  The area thus outlined was incised in the appropriate surgical plane with a #15 scalpel blade. The resulting defect was undermined widely and bilaterally in the appropriate surgical plane taking care to preserve local arteries, veins, nerves, and other structures of anatomic importance. This created a flap capable of rotating across the defect to close the wound under minimal tension. Hemostasis was obtained and then the flap was sutured together in layered fashion.
Island Pedicle Flap With Canthal Suspension Text: The defect edges were debeveled with a #15 scalpel blade.  Given the location of the defect, shape of the defect and the proximity to free margins an island pedicle advancement flap was deemed most appropriate.  Using a sterile surgical marker, an appropriate advancement flap was drawn incorporating the defect, outlining the appropriate donor tissue and placing the expected incisions within the relaxed skin tension lines where possible. The area thus outlined was incised deep to adipose tissue with a #15 scalpel blade.  The skin margins were undermined to an appropriate distance in all directions around the primary defect and laterally outward around the island pedicle utilizing iris scissors.  There was minimal undermining beneath the pedicle flap. A suspension suture was placed in the canthal tendon to prevent tension and prevent ectropion.
Alar Island Pedicle Flap Text: The defect edges were debeveled with a #15 scalpel blade.  Given the location of the defect, shape of the defect and the proximity to the alar rim an island pedicle advancement flap was deemed most appropriate.  Using a sterile surgical marker, an appropriate advancement flap was drawn incorporating the defect, outlining the appropriate donor tissue and placing the expected incisions within the nasal ala running parallel to the alar rim. The area thus outlined was incised with a #15 scalpel blade.  The skin margins were undermined minimally to an appropriate distance in all directions around the primary defect and laterally outward around the island pedicle utilizing iris scissors.  There was minimal undermining beneath the pedicle flap.
Double Island Pedicle Flap Text: The defect edges were debeveled with a #15 scalpel blade.  Given the location of the defect, shape of the defect and the proximity to free margins a double island pedicle advancement flap was deemed most appropriate.  Using a sterile surgical marker, an appropriate advancement flap was drawn incorporating the defect, outlining the appropriate donor tissue and placing the expected incisions within the relaxed skin tension lines where possible.    The area thus outlined was incised deep to adipose tissue with a #15 scalpel blade.  The skin margins were undermined to an appropriate distance in all directions around the primary defect and laterally outward around the island pedicle utilizing iris scissors.  There was minimal undermining beneath the pedicle flap.
Island Pedicle Flap-Requiring Vessel Identification Text: The defect edges were debeveled with a #15 scalpel blade.  Given the location of the defect, shape of the defect and the proximity to free margins an island pedicle advancement flap was deemed most appropriate.  Using a sterile surgical marker, an appropriate advancement flap was drawn, based on the axial vessel mentioned above, incorporating the defect, outlining the appropriate donor tissue and placing the expected incisions within the relaxed skin tension lines where possible.    The area thus outlined was incised deep to adipose tissue with a #15 scalpel blade.  The skin margins were undermined to an appropriate distance in all directions around the primary defect and laterally outward around the island pedicle utilizing iris scissors.  There was minimal undermining beneath the pedicle flap.
Keystone Flap Text: The defect edges were debeveled with a #15 scalpel blade.  Given the location of the defect, shape of the defect a keystone flap was deemed most appropriate.  Using a sterile surgical marker, an appropriate keystone flap was drawn incorporating the defect, outlining the appropriate donor tissue and placing the expected incisions within the relaxed skin tension lines where possible. The area thus outlined was incised deep to adipose tissue with a #15 scalpel blade.  The skin margins were undermined to an appropriate distance in all directions around the primary defect and laterally outward around the flap utilizing iris scissors.
O-T Plasty Text: The defect edges were debeveled with a #15 scalpel blade.  Given the location of the defect, shape of the defect and the proximity to free margins an O-T plasty was deemed most appropriate.  Using a sterile surgical marker, an appropriate O-T plasty was drawn incorporating the defect and placing the expected incisions within the relaxed skin tension lines where possible.    The area thus outlined was incised deep to adipose tissue with a #15 scalpel blade.  The skin margins were undermined to an appropriate distance in all directions utilizing iris scissors.
O-Z Plasty Text: The defect edges were debeveled with a #15 scalpel blade.  Given the location of the defect, shape of the defect and the proximity to free margins an O-Z plasty (double transposition flap) was deemed most appropriate.  Using a sterile surgical marker, the appropriate transposition flaps were drawn incorporating the defect and placing the expected incisions within the relaxed skin tension lines where possible.    The area thus outlined was incised deep to adipose tissue with a #15 scalpel blade.  The skin margins were undermined to an appropriate distance in all directions utilizing iris scissors.  Hemostasis was achieved with electrocautery.  The flaps were then transposed into place, one clockwise and the other counterclockwise, and anchored with interrupted buried subcutaneous sutures.
Double O-Z Plasty Text: The defect edges were debeveled with a #15 scalpel blade.  Given the location of the defect, shape of the defect and the proximity to free margins a Double O-Z plasty (double transposition flap) was deemed most appropriate.  Using a sterile surgical marker, the appropriate transposition flaps were drawn incorporating the defect and placing the expected incisions within the relaxed skin tension lines where possible. The area thus outlined was incised deep to adipose tissue with a #15 scalpel blade.  The skin margins were undermined to an appropriate distance in all directions utilizing iris scissors.  Hemostasis was achieved with electrocautery.  The flaps were then transposed into place, one clockwise and the other counterclockwise, and anchored with interrupted buried subcutaneous sutures.
V-Y Plasty Text: The defect edges were debeveled with a #15 scalpel blade.  Given the location of the defect, shape of the defect and the proximity to free margins an V-Y advancement flap was deemed most appropriate.  Using a sterile surgical marker, an appropriate advancement flap was drawn incorporating the defect and placing the expected incisions within the relaxed skin tension lines where possible.    The area thus outlined was incised deep to adipose tissue with a #15 scalpel blade.  The skin margins were undermined to an appropriate distance in all directions utilizing iris scissors.
H Plasty Text: Given the location of the defect, shape of the defect and the proximity to free margins a H-plasty was deemed most appropriate for repair.  Using a sterile surgical marker, the appropriate advancement arms of the H-plasty were drawn incorporating the defect and placing the expected incisions within the relaxed skin tension lines where possible. The area thus outlined was incised deep to adipose tissue with a #15 scalpel blade. The skin margins were undermined to an appropriate distance in all directions utilizing iris scissors.  The opposing advancement arms were then advanced into place in opposite direction and anchored with interrupted buried subcutaneous sutures.
W Plasty Text: The lesion was extirpated to the level of the fat with a #15 scalpel blade.  Given the location of the defect, shape of the defect and the proximity to free margins a W-plasty was deemed most appropriate for repair.  Using a sterile surgical marker, the appropriate transposition arms of the W-plasty were drawn incorporating the defect and placing the expected incisions within the relaxed skin tension lines where possible.    The area thus outlined was incised deep to adipose tissue with a #15 scalpel blade.  The skin margins were undermined to an appropriate distance in all directions utilizing iris scissors.  The opposing transposition arms were then transposed into place in opposite direction and anchored with interrupted buried subcutaneous sutures.
Z Plasty Text: The lesion was extirpated to the level of the fat with a #15 scalpel blade.  Given the location of the defect, shape of the defect and the proximity to free margins a Z-plasty was deemed most appropriate for repair.  Using a sterile surgical marker, the appropriate transposition arms of the Z-plasty were drawn incorporating the defect and placing the expected incisions within the relaxed skin tension lines where possible.    The area thus outlined was incised deep to adipose tissue with a #15 scalpel blade.  The skin margins were undermined to an appropriate distance in all directions utilizing iris scissors.  The opposing transposition arms were then transposed into place in opposite direction and anchored with interrupted buried subcutaneous sutures.
Double Z Plasty Text: The lesion was extirpated to the level of the fat with a #15 scalpel blade. Given the location of the defect, shape of the defect and the proximity to free margins a double Z-plasty was deemed most appropriate for repair. Using a sterile surgical marker, the appropriate transposition arms of the double Z-plasty were drawn incorporating the defect and placing the expected incisions within the relaxed skin tension lines where possible. The area thus outlined was incised deep to adipose tissue with a #15 scalpel blade. The skin margins were undermined to an appropriate distance in all directions utilizing iris scissors. The opposing transposition arms were then transposed and carried over into place in opposite direction and anchored with interrupted buried subcutaneous sutures.
Zygomaticofacial Flap Text: Given the location of the defect, shape of the defect and the proximity to free margins a zygomaticofacial flap was deemed most appropriate for repair.  Using a sterile surgical marker, the appropriate flap was drawn incorporating the defect and placing the expected incisions within the relaxed skin tension lines where possible. The area thus outlined was incised deep to adipose tissue with a #15 scalpel blade with preservation of a vascular pedicle.  The skin margins were undermined to an appropriate distance in all directions utilizing iris scissors.  The flap was then placed into the defect and anchored with interrupted buried subcutaneous sutures.
Cheek Interpolation Flap Text: A decision was made to reconstruct the defect utilizing an interpolation axial flap and a staged reconstruction.  A telfa template was made of the defect.  This telfa template was then used to outline the Cheek Interpolation flap.  The donor area for the pedicle flap was then injected with anesthesia.  The flap was excised through the skin and subcutaneous tissue down to the layer of the underlying musculature.  The interpolation flap was carefully excised within this deep plane to maintain its blood supply.  The edges of the donor site were undermined.   The donor site was closed in a primary fashion.  The pedicle was then rotated into position and sutured.  Once the tube was sutured into place, adequate blood supply was confirmed with blanching and refill.  The pedicle was then wrapped with xeroform gauze and dressed appropriately with a telfa and gauze bandage to ensure continued blood supply and protect the attached pedicle.
Cheek-To-Nose Interpolation Flap Text: A decision was made to reconstruct the defect utilizing an interpolation axial flap and a staged reconstruction.  A telfa template was made of the defect.  This telfa template was then used to outline the Cheek-To-Nose Interpolation flap.  The donor area for the pedicle flap was then injected with anesthesia.  The flap was excised through the skin and subcutaneous tissue down to the layer of the underlying musculature.  The interpolation flap was carefully excised within this deep plane to maintain its blood supply.  The edges of the donor site were undermined.   The donor site was closed in a primary fashion.  The pedicle was then rotated into position and sutured.  Once the tube was sutured into place, adequate blood supply was confirmed with blanching and refill.  The pedicle was then wrapped with xeroform gauze and dressed appropriately with a telfa and gauze bandage to ensure continued blood supply and protect the attached pedicle.
Interpolation Flap Text: A decision was made to reconstruct the defect utilizing an interpolation axial flap and a staged reconstruction.  A telfa template was made of the defect.  This telfa template was then used to outline the interpolation flap.  The donor area for the pedicle flap was then injected with anesthesia.  The flap was excised through the skin and subcutaneous tissue down to the layer of the underlying musculature.  The interpolation flap was carefully excised within this deep plane to maintain its blood supply.  The edges of the donor site were undermined.   The donor site was closed in a primary fashion.  The pedicle was then rotated into position and sutured.  Once the tube was sutured into place, adequate blood supply was confirmed with blanching and refill.  The pedicle was then wrapped with xeroform gauze and dressed appropriately with a telfa and gauze bandage to ensure continued blood supply and protect the attached pedicle.
Melolabial Interpolation Flap Text: A decision was made to reconstruct the defect utilizing an interpolation axial flap and a staged reconstruction.  A telfa template was made of the defect.  This telfa template was then used to outline the melolabial interpolation flap.  The donor area for the pedicle flap was then injected with anesthesia.  The flap was excised through the skin and subcutaneous tissue down to the layer of the underlying musculature.  The pedicle flap was carefully excised within this deep plane to maintain its blood supply.  The edges of the donor site were undermined.   The donor site was closed in a primary fashion.  The pedicle was then rotated into position and sutured.  Once the tube was sutured into place, adequate blood supply was confirmed with blanching and refill.  The pedicle was then wrapped with xeroform gauze and dressed appropriately with a telfa and gauze bandage to ensure continued blood supply and protect the attached pedicle.
Mastoid Interpolation Flap Text: A decision was made to reconstruct the defect utilizing an interpolation axial flap and a staged reconstruction.  A telfa template was made of the defect.  This telfa template was then used to outline the mastoid interpolation flap.  The donor area for the pedicle flap was then injected with anesthesia.  The flap was excised through the skin and subcutaneous tissue down to the layer of the underlying musculature.  The pedicle flap was carefully excised within this deep plane to maintain its blood supply.  The edges of the donor site were undermined.   The donor site was closed in a primary fashion.  The pedicle was then rotated into position and sutured.  Once the tube was sutured into place, adequate blood supply was confirmed with blanching and refill.  The pedicle was then wrapped with xeroform gauze and dressed appropriately with a telfa and gauze bandage to ensure continued blood supply and protect the attached pedicle.
Posterior Auricular Interpolation Flap Text: A decision was made to reconstruct the defect utilizing an interpolation axial flap and a staged reconstruction.  A telfa template was made of the defect.  This telfa template was then used to outline the posterior auricular interpolation flap.  The donor area for the pedicle flap was then injected with anesthesia.  The flap was excised through the skin and subcutaneous tissue down to the layer of the underlying musculature.  The pedicle flap was carefully excised within this deep plane to maintain its blood supply.  The edges of the donor site were undermined.   The donor site was closed in a primary fashion.  The pedicle was then rotated into position and sutured.  Once the tube was sutured into place, adequate blood supply was confirmed with blanching and refill.  The pedicle was then wrapped with xeroform gauze and dressed appropriately with a telfa and gauze bandage to ensure continued blood supply and protect the attached pedicle.
Paramedian Forehead Flap Text: A decision was made to reconstruct the defect utilizing an interpolation axial flap and a staged reconstruction.  A telfa template was made of the defect.  This telfa template was then used to outline the paramedian forehead pedicle flap.  The donor area for the pedicle flap was then injected with anesthesia.  The flap was excised through the skin and subcutaneous tissue down to the layer of the underlying musculature.  The pedicle flap was carefully excised within this deep plane to maintain its blood supply.  The edges of the donor site were undermined.   The donor site was closed in a primary fashion.  The pedicle was then rotated into position and sutured.  Once the tube was sutured into place, adequate blood supply was confirmed with blanching and refill.  The pedicle was then wrapped with xeroform gauze and dressed appropriately with a telfa and gauze bandage to ensure continued blood supply and protect the attached pedicle.
Abbe Flap (Upper To Lower Lip) Text: The defect of the lower lip was assessed and measured.  Given the location and size of the defect, an Abbe flap was deemed most appropriate.  Using a sterile surgical marker, an appropriate Abbe flap was measured and drawn on the upper lip. Local anesthesia was then infiltrated.  A scalpel was then used to incise the upper lip through and through the skin, vermilion, muscle and mucosa, leaving the flap pedicled on the opposite side.  The flap was then rotated and transferred to the lower lip defect.  The flap was then sutured into place with a three layer technique, closing the orbicularis oris muscle layer with subcutaneous buried sutures, followed by a mucosal layer and an epidermal layer.
Abbe Flap (Lower To Upper Lip) Text: The defect of the upper lip was assessed and measured.  Given the location and size of the defect, an Abbe flap was deemed most appropriate.  Using a sterile surgical marker, an appropriate Abbe flap was measured and drawn on the lower lip. Local anesthesia was then infiltrated. A scalpel was then used to incise the upper lip through and through the skin, vermilion, muscle and mucosa, leaving the flap pedicled on the opposite side.  The flap was then rotated and transferred to the lower lip defect.  The flap was then sutured into place with a three layer technique, closing the orbicularis oris muscle layer with subcutaneous buried sutures, followed by a mucosal layer and an epidermal layer.
Estlander Flap (Upper To Lower Lip) Text: The defect of the lower lip was assessed and measured.  Given the location and size of the defect, an Estlander flap was deemed most appropriate.  Using a sterile surgical marker, an appropriate Estlander flap was measured and drawn on the upper lip. Local anesthesia was then infiltrated. A scalpel was then used to incise the lateral aspect of the flap, through skin, muscle and mucosa, leaving the flap pedicled medially.  The flap was then rotated and positioned to fill the lower lip defect.  The flap was then sutured into place with a three layer technique, closing the orbicularis oris muscle layer with subcutaneous buried sutures, followed by a mucosal layer and an epidermal layer.
Cheiloplasty (Less Than 50%) Text: A decision was made to reconstruct the defect with a  cheiloplasty.  The defect was undermined extensively.  Additional orbicularis oris muscle was excised with a 15 blade scalpel.  The defect was converted into a full thickness wedge, of less than 50% of the vertical height of the lip, to facilite a better cosmetic result.  Small vessels were then tied off with 5-0 monocyrl. The orbicularis oris, superficial fascia, adipose and dermis were then reapproximated.  After the deeper layers were approximated the epidermis was reapproximated with particular care given to realign the vermilion border.
Cheiloplasty (Complex) Text: A decision was made to reconstruct the defect with a  cheiloplasty.  The defect was undermined extensively.  Additional orbicularis oris muscle was excised with a 15 blade scalpel.  The defect was converted into a full thickness wedge to facilite a better cosmetic result.  Small vessels were then tied off with 5-0 monocyrl. The orbicularis oris, superficial fascia, adipose and dermis were then reapproximated.  After the deeper layers were approximated the epidermis was reapproximated with particular care given to realign the vermilion border.
Ear Wedge Repair Text: A wedge excision was completed by carrying down an excision through the full thickness of the ear and cartilage with an inward facing Burow's triangle. The wound was then closed in a layered fashion.
Full Thickness Lip Wedge Repair (Flap) Text: Given the location of the defect and the proximity to free margins a full thickness wedge repair was deemed most appropriate.  Using a sterile surgical marker, the appropriate repair was drawn incorporating the defect and placing the expected incisions perpendicular to the vermilion border.  The vermilion border was also meticulously outlined to ensure appropriate reapproximation during the repair.  The area thus outlined was incised through and through with a #15 scalpel blade.  The muscularis and dermis were reaproximated with deep sutures following hemostasis. Care was taken to realign the vermilion border before proceeding with the superficial closure.  Once the vermilion was realigned the superfical and mucosal closure was finished.
Ftsg Text: Given the location of the defect, shape of the defect and the proximity to free margins a full thickness skin graft was deemed most appropriate. The risks, benefits, and possible outcomes of this procedure were discussed along with the risks, benefits, and possible outcomes of other options for wound repair (including but not limited to: variations of complex closure, flaps, other types of grafts, and second intention). The patient verbalized understanding and consent to the outlined procedure. The patient understands should the surgical site heal in a manner they find unsatisfactory further interventions or referral to an additional specialist may be required. The graft donor and recipient site were prepped and draped in sterile fashion. Anesthesia was checked and supplemented as necessary. The primary defect shape was templated with sterile suture packing and transferred to the donor site. The area thus outlined was incised, along with planned standing tissue cones, deep to adipose tissue with a #15 scalpel blade.  The harvested graft was then trimmed of adipose tissue until only dermis and epidermis was left.  The skin margins of the secondary defect were undermined to an appropriate distance in all directions utilizing fine tipped surgical scissors. Hemostasis was achieved and then the secondary defect was sutured with interrupted buried subcutaneous sutures. The skin graft was then placed in the primary defect and oriented appropriately. The graft was sutured into place with simple interrupted and simple running cutaneous sutures.
Split-Thickness Skin Graft Text: The defect edges were debeveled with a #15 scalpel blade.  Given the location of the defect, shape of the defect and the proximity to free margins a split thickness skin graft was deemed most appropriate.  Using a sterile surgical marker, the primary defect shape was transferred to the donor site. The split thickness graft was then harvested.  The skin graft was then placed in the primary defect and oriented appropriately.
Pinch Graft Text: The defect edges were debeveled with a #15 scalpel blade. Given the location of the defect, shape of the defect and the proximity to free margins a pinch graft was deemed most appropriate. Using a sterile surgical marker, the primary defect shape was transferred to the donor site. The area thus outlined was incised deep to adipose tissue with a #15 scalpel blade.  The harvested graft was then trimmed of adipose tissue until only dermis and epidermis was left. The skin margins of the secondary defect were undermined to an appropriate distance in all directions utilizing iris scissors.  The secondary defect was closed with interrupted buried subcutaneous sutures.  The skin edges were then re-apposed with running  sutures.  The skin graft was then placed in the primary defect and oriented appropriately.
Burow's Graft Text: Given the location of the defect, shape of the defect, the proximity to free margins and the presence of a standing cone deformity a Burow's skin graft was deemed most appropriate. The risks, benefits, and possible outcomes of this procedure were discussed along with the risks, benefits, and possible outcomes of other options for wound repair (including but not limited to: variations of complex closure, flaps, other types of grafts, and second intention). The patient verbalized understanding and consent to the outlined procedure. The patient understands should the surgical site heal in a manner they find unsatisfactory further interventions or referral to an additional specialist may be required. The graft donor and recipient site were prepped and draped in sterile fashion. Anesthesia was checked and supplemented as necessary. The standing cone was removed and this tissue was then trimmed to the shape of the primary defect. The adipose tissue was also removed until only dermis and epidermis were left.  The skin margins of the secondary defect were undermined to an appropriate distance in all directions utilizing iris scissors.  The secondary defect was closed with interrupted buried subcutaneous sutures.  The skin graft was then placed in the primary defect and oriented appropriately. The graft was sutured into place with simple interrupted and simple running cutaneous sutures.
Cartilage Graft Text: The defect edges were debeveled with a #15 scalpel blade.  Given the location of the defect, shape of the defect, the fact the defect involved a full thickness cartilage defect a cartilage graft was deemed most appropriate.  An appropriate donor site was identified, cleansed, and anesthetized. The cartilage graft was then harvested and transferred to the recipient site, oriented appropriately and then sutured into place.  The secondary defect was then repaired using a primary closure.
Composite Graft Text: The defect edges were debeveled with a #15 scalpel blade.  Given the location of the defect, shape of the defect, the proximity to free margins and the fact the defect was full thickness a composite graft was deemed most appropriate.  The defect was outline and then transferred to the donor site.  A full thickness graft was then excised from the donor site. The graft was then placed in the primary defect, oriented appropriately and then sutured into place.  The secondary defect was then repaired using a primary closure.
Epidermal Autograft Text: The defect edges were debeveled with a #15 scalpel blade.  Given the location of the defect, shape of the defect and the proximity to free margins an epidermal autograft was deemed most appropriate.  Using a sterile surgical marker, the primary defect shape was transferred to the donor site. The epidermal graft was then harvested.  The skin graft was then placed in the primary defect and oriented appropriately.
Dermal Autograft Text: The defect edges were debeveled with a #15 scalpel blade.  Given the location of the defect, shape of the defect and the proximity to free margins a dermal autograft was deemed most appropriate.  Using a sterile surgical marker, the primary defect shape was transferred to the donor site. The area thus outlined was incised deep to adipose tissue with a #15 scalpel blade.  The harvested graft was then trimmed of adipose and epidermal tissue until only dermis was left.  The skin graft was then placed in the primary defect and oriented appropriately.
Skin Substitute Text: The defect edges were debeveled with a #15 scalpel blade.  Given the location of the defect, shape of the defect and the proximity to free margins a skin substitute graft was deemed most appropriate.  The graft material was trimmed to fit the size of the defect. The graft was then placed in the primary defect and oriented appropriately.
Tissue Cultured Epidermal Autograft Text: The defect edges were debeveled with a #15 scalpel blade.  Given the location of the defect, shape of the defect and the proximity to free margins a tissue cultured epidermal autograft was deemed most appropriate.  The graft was then trimmed to fit the size of the defect.  The graft was then placed in the primary defect and oriented appropriately.
Xenograft Text: The defect edges were debeveled with a #15 scalpel blade.  Given the location of the defect, shape of the defect and the proximity to free margins a xenograft was deemed most appropriate.  The graft was then trimmed to fit the size of the defect.  The graft was then placed in the primary defect and oriented appropriately.
Purse String (Simple) Text: Given the location of the defect and the characteristics of the surrounding skin a purse string closure was deemed most appropriate.  Undermining was performed circumferentially around the surgical defect.  A purse string suture was then placed and tightened.
Purse String (Intermediate) Text: Given the location of the defect and the characteristics of the surrounding skin a purse string intermediate closure was deemed most appropriate.  Undermining was performed circumferentially around the surgical defect.  A purse string suture was then placed and tightened.
Partial Purse String (Simple) Text: Given the location of the defect and the characteristics of the surrounding skin a simple purse string closure was deemed most appropriate.  Undermining was performed circumferentially around the surgical defect.  A purse string suture was then placed and tightened. Wound tension only allowed a partial closure of the circular defect.
Partial Purse String (Intermediate) Text: Given the location of the defect and the characteristics of the surrounding skin an intermediate purse string closure was deemed most appropriate.  Undermining was performed circumferentially around the surgical defect.  A purse string suture was then placed and tightened. Wound tension only allowed a partial closure of the circular defect.
Localized Dermabrasion With Wire Brush Text: The patient was draped in routine manner.  Localized dermabrasion using 3 x 17 mm wire brush was performed in routine manner to papillary dermis. This spot dermabrasion is being performed to complete skin cancer reconstruction. It also will eliminate the other sun damaged precancerous cells that are known to be part of the regional effect of a lifetime's worth of sun exposure. This localized dermabrasion is therapeutic and should not be considered cosmetic in any regard.
Tarsorrhaphy Text: A tarsorrhaphy was performed using Frost sutures.
Intermediate Repair And Flap Additional Text (Will Appearing After The Standard Complex Repair Text): The intermediate repair was not sufficient to completely close the primary defect. The remaining additional defect was repaired with the flap mentioned below.
Intermediate Repair And Graft Additional Text (Will Appearing After The Standard Complex Repair Text): The intermediate repair was not sufficient to completely close the primary defect. The remaining additional defect was repaired with the graft mentioned below.
Complex Repair And Flap Additional Text (Will Appearing After The Standard Complex Repair Text): The complex repair was not sufficient to completely close the primary defect. The remaining additional defect was repaired with the flap mentioned below.
Complex Repair And Graft Additional Text (Will Appearing After The Standard Complex Repair Text): The complex repair was not sufficient to completely close the primary defect. The remaining additional defect was repaired with the graft mentioned below.
Eyelid Full Thickness Repair - 14397: The eyelid defect was full thickness which required a wedge repair of the eyelid. Special care was taken to ensure that the eyelid margin was realligned when placing sutures.
Eyelid Partial Thickness Repair - 12139: The eyelid defect was partial thickness which required a wedge repair of the eyelid. Special care was taken to ensure that the eyelid margin was realligned when placing sutures.
Manual Repair Warning Statement: We plan on removing the manually selected variable below in favor of our much easier automatic structured text blocks found in the previous tab. We decided to do this to help make the flow better and give you the full power of structured data. Manual selection is never going to be ideal in our platform and I would encourage you to avoid using manual selection from this point on, especially since I will be sunsetting this feature. It is important that you do one of two things with the customized text below. First, you can save all of the text in a word file so you can have it for future reference. Second, transfer the text to the appropriate area in the Library tab. Lastly, if there is a flap or graft type which we do not have you need to let us know right away so I can add it in before the variable is hidden. No need to panic, we plan to give you roughly 6 months to make the change.
Same Histology In Subsequent Stages Text: The pattern and morphology of the tumor is as described in the first stage.
No Residual Tumor Seen Histology Text: There were no malignant cells seen in the sections examined.
Inflammation Suggestive Of Cancer Camouflage Histology Text: There was a dense lymphocytic infiltrate which prevented adequate histologic evaluation of adjacent structures.
Incidental Superficial Basal Cell Carcinoma Histology Text: One or more foci of “budding” basaloid tumor with peripheral palisading is attached superficially to the epidermis and/or adjacent superficial hair follicle(s) and is surrounded by a myxoid stroma.
Bcc Histology Text: There were numerous aggregates of basaloid cells.
Bcc Infiltrative Histology Text: Thin cords of basaloid tumor with angulated ends/spiky islands composed of few to many basaloid keratinocytes, embedded in a myxoid to sclerotic stroma. Retraction of the tumor islands away from the surrounding stroma is variable.
Bcc Micronodular Histology Text: Multiple small aggregates of basaloid cells are present within the dermis. These small nodules display variable subtle peripheral palisading and retraction artifact.
Bcc  Morpheaform/Sclerosing Histology Text: Thin cords of basaloid cells surrounded by a sclerotic collagenous stroma, with mostly absent peripheral palisading and stromal cleft formation.
Bcc  Nodular Histology Text: Discrete nests/nodules of malignant basaloid tumor is present within the dermis and/or attached to the epidermis. The tumor demonstrates palisading and is retracted away from a surrounding mucoid stroma.
Bcc  Nodulocystic Histology Text: Discrete nests/nodules of malignant basaloid tumor is present within the dermis and/or attached to the epidermis. The tumor demonstrates palisading and is retracted away from a surrounding mucoid stroma. Some areas of tumor show cystic degeneration.
Bcc Pigmented Histology Text: Discrete nests/nodules of malignant basaloid tumor is present within the dermis and/or attached to the epidermis. The tumor demonstrates palisading and is retracted away from a surrounding mucoid stroma. Some areas of tumor show deposition of melanin.
Bcc Superficial Histology Text: Multiple foci of “budding” basaloid tumor with peripheral palisading is attached superficially to the epidermis and/or adjacent superficial hair follicle(s) and is surrounded by a myxoid stroma.
Mixed Superficial And Nodular Bcc Histology Text: Histologic features of both superficial (Multiple foci of “budding” basaloid tumor with peripheral palisading is attached superficially to the epidermis and/or adjacent superficial hair follicle(s) and is surrounded by a myxoid stroma) AND nodular (Discrete nests/nodules of malignant basaloid tumor is present within the dermis and/or attached to the epidermis. The tumor demonstrates palisading and is retracted away from a surrounding mucoid stroma) basal cell carcinoma are present.
Mixed Nodular And Infiltrative Bcc Histology Text: Histologic features of both nodular (Discrete nests/nodules of malignant basaloid tumor is present within the dermis and/or attached to the epidermis. The tumor demonstrates palisading and is retracted away from a surrounding mucoid stroma) and infiltrative (Thin cords of basaloid tumor with angulated ends/spiky islands composed of few to many basaloid keratinocytes, embedded in a myxoid to sclerotic stroma. Retraction of the tumor islands away from the surrounding stroma is variable) basal cell carcinoma are present.
Mixed Nodular And Micronodular Bcc Histology Text: Histologic features of both nodular (Discrete nests/nodules of malignant basaloid tumor is present within the dermis and/or attached to the epidermis. The tumor demonstrates palisading and is retracted away from a surrounding mucoid stroma) and micronodular (Multiple small aggregates of basaloid cells are present within the dermis. These small nodules display variable subtle peripheral palisading and retraction artifact) basal cell carcinoma are present.
Metatypical Bcc Histology Text: areas of basaloid tumor with variable eosinophilic features and occasional apoptotic cells are visualized in the dermis. Peripheral palisading and stromal retraction is rare to absent.
Scc Histology Text: Full thickness keratinocyte atypia is observed microscopically. Focal dyskeratosis and apoptosis of keratinocytes is observed. The epidermis is acanthotic with growth of atypical keratinocytes beyond the level of the sebaceous glands.
Scc Moderately Differentiated Histology Text: Full thickness keratinocyte atypia is observed microscopically. Focal dyskeratosis and apoptosis of keratinocytes is observed. The epidermis is acanthotic with growth of atypical keratinocytes beyond the level of the sebaceous glands, many groups of keratinocytes display abnormal or absent keratin formation.
Scc Poorly Differentiated Histology Text: Full thickness keratinocyte atypia is observed microscopically. Focal dyskeratosis and apoptosis of keratinocytes is observed. The epidermis is acanthotic with growth of atypical keratinocytes beyond the level of the sebaceous glands, most keratinocytes show little to no keratinization.
Scc In Situ Histology Text: full thickness keratinocyte atypia is present within the epidermis with overlying loss of the granular cell layer and overlying parakeratosis.
Scc In Situ With Follicular Extension Histology Text: full thickness keratinocyte atypia is present within the epidermis with overlying loss of the granular cell layer and overlying parakeratosis. Adjacent follicular units are notably involved.
Mart-1 - Positive Histology Text: MART-1 staining demonstrates areas of higher density and clustering of melanocytes with Pagetoid spread upwards within the epidermis. The surgical margins are positive for tumor cells.
Mart-1 - Negative Histology Text: MART-1 staining demonstrates a normal density and pattern of melanocytes along the dermal-epidermal junction. The surgical margins are negative for tumor cells.
Information: Selecting Yes will display possible errors in your note based on the variables you have selected. This validation is only offered as a suggestion for you. PLEASE NOTE THAT THE VALIDATION TEXT WILL BE REMOVED WHEN YOU FINALIZE YOUR NOTE. IF YOU WANT TO FAX A PRELIMINARY NOTE YOU WILL NEED TO TOGGLE THIS TO 'NO' IF YOU DO NOT WANT IT IN YOUR FAXED NOTE.

## 2024-02-27 NOTE — PROCEDURE: MIPS QUALITY
Quality 47: Advance Care Plan: Advance Care Planning discussed and documented; advance care plan or surrogate decision maker documented in the medical record.
Quality 226: Preventive Care And Screening: Tobacco Use: Screening And Cessation Intervention: Patient screened for tobacco use and is an ex/non-smoker
Quality 358: Patient-Centered Surgical Risk Assessment And Communication: Documentation of patient-specific risk assessment with a risk calculator based on multi-institutional clinical data, the specific risk calculator used, and communication of risk assessment from risk calculator with the patient or family.
Detail Level: Detailed

## 2024-05-08 NOTE — PROCEDURE: MOHS SURGERY
Population Health Chart Review & Patient Outreach Details      Additional [facility] Health Notes:               Updates Requested / Reviewed:      Updated Care Coordination Note, Care Everywhere, , External Sources: [facility] and [facility], and Immunizations Reconciliation Completed or Queried: [facility] Topics Overdue:      VB Score: 3     Colon Cancer Screening  Eye Exam  Foot Exam    Tetanus Vaccine  Shingles/Zoster Vaccine                  Health Maintenance Topic(s) Outreach Outcomes & Actions Taken:    Breast Cancer Screening - Outreach Outcomes & Actions Taken  : patient outreach    Eye Exam - Outreach Outcomes & Actions Taken  : patient outreach    Medication Adherence / Statins - Outreach Outcomes & Actions Taken  :   Note added to next scheduled PCP appointment note in appointment desk of statin review due.    Diabetic Foot Exam - Outreach Outcomes & Actions Taken  : patient outreach   Incidental Superficial Basal Cell Carcinoma Histology Text: One or more foci of “budding” basaloid tumor with peripheral palisading is attached superficially to the epidermis and/or adjacent superficial hair follicle(s) and is surrounded by a myxoid stroma.

## 2024-06-01 NOTE — PROCEDURE: BIOPSY BY SHAVE METHOD
Destruction After The Procedure: No

## 2024-06-03 ENCOUNTER — APPOINTMENT (OUTPATIENT)
Dept: URBAN - METROPOLITAN AREA SURGERY 18 | Age: 79
Setting detail: DERMATOLOGY
End: 2024-06-09

## 2024-06-03 DIAGNOSIS — Z71.89 OTHER SPECIFIED COUNSELING: ICD-10-CM

## 2024-06-03 DIAGNOSIS — Z86.007 PERSONAL HISTORY OF IN-SITU NEOPLASM OF SKIN: ICD-10-CM

## 2024-06-03 DIAGNOSIS — Z85.828 PERSONAL HISTORY OF OTHER MALIGNANT NEOPLASM OF SKIN: ICD-10-CM

## 2024-06-03 DIAGNOSIS — L82.1 OTHER SEBORRHEIC KERATOSIS: ICD-10-CM

## 2024-06-03 DIAGNOSIS — D18.0 HEMANGIOMA: ICD-10-CM

## 2024-06-03 DIAGNOSIS — Z86.006 PERSONAL HISTORY OF MELANOMA IN-SITU: ICD-10-CM

## 2024-06-03 DIAGNOSIS — Z85.820 PERSONAL HISTORY OF MALIGNANT MELANOMA OF SKIN: ICD-10-CM

## 2024-06-03 DIAGNOSIS — L81.4 OTHER MELANIN HYPERPIGMENTATION: ICD-10-CM

## 2024-06-03 DIAGNOSIS — L57.0 ACTINIC KERATOSIS: ICD-10-CM

## 2024-06-03 DIAGNOSIS — D22 MELANOCYTIC NEVI: ICD-10-CM

## 2024-06-03 PROBLEM — D22.62 MELANOCYTIC NEVI OF LEFT UPPER LIMB, INCLUDING SHOULDER: Status: ACTIVE | Noted: 2024-06-03

## 2024-06-03 PROBLEM — D22.5 MELANOCYTIC NEVI OF TRUNK: Status: ACTIVE | Noted: 2024-06-03

## 2024-06-03 PROBLEM — D22.72 MELANOCYTIC NEVI OF LEFT LOWER LIMB, INCLUDING HIP: Status: ACTIVE | Noted: 2024-06-03

## 2024-06-03 PROBLEM — D22.61 MELANOCYTIC NEVI OF RIGHT UPPER LIMB, INCLUDING SHOULDER: Status: ACTIVE | Noted: 2024-06-03

## 2024-06-03 PROBLEM — D18.01 HEMANGIOMA OF SKIN AND SUBCUTANEOUS TISSUE: Status: ACTIVE | Noted: 2024-06-03

## 2024-06-03 PROBLEM — D22.71 MELANOCYTIC NEVI OF RIGHT LOWER LIMB, INCLUDING HIP: Status: ACTIVE | Noted: 2024-06-03

## 2024-06-03 PROCEDURE — OTHER COUNSELING: OTHER

## 2024-06-03 PROCEDURE — OTHER MIPS QUALITY: OTHER

## 2024-06-03 PROCEDURE — OTHER LIQUID NITROGEN: OTHER

## 2024-06-03 PROCEDURE — 99213 OFFICE O/P EST LOW 20 MIN: CPT | Mod: 25

## 2024-06-03 PROCEDURE — OTHER DIAGNOSIS COMMENT: OTHER

## 2024-06-03 PROCEDURE — 17004 DESTROY PREMAL LESIONS 15/>: CPT

## 2024-06-03 ASSESSMENT — LOCATION DETAILED DESCRIPTION DERM
LOCATION DETAILED: UPPER STERNUM
LOCATION DETAILED: RIGHT ANTERIOR PROXIMAL THIGH
LOCATION DETAILED: LEFT MEDIAL ZYGOMA
LOCATION DETAILED: LEFT CENTRAL LATERAL NECK
LOCATION DETAILED: RIGHT PROXIMAL PRETIBIAL REGION
LOCATION DETAILED: LEFT ANTERIOR DISTAL THIGH
LOCATION DETAILED: LEFT ANTERIOR PROXIMAL THIGH
LOCATION DETAILED: RIGHT SUPERIOR FOREHEAD
LOCATION DETAILED: LEFT FOREHEAD
LOCATION DETAILED: SUPERIOR THORACIC SPINE
LOCATION DETAILED: NASAL SUPRATIP
LOCATION DETAILED: LEFT SUPERIOR PARIETAL SCALP
LOCATION DETAILED: RIGHT CLAVICULAR NECK
LOCATION DETAILED: EPIGASTRIC SKIN
LOCATION DETAILED: LEFT POSTERIOR ANKLE
LOCATION DETAILED: NASAL TIP
LOCATION DETAILED: RIGHT INFERIOR FOREHEAD
LOCATION DETAILED: RIGHT RADIAL DORSAL HAND
LOCATION DETAILED: RIGHT INFERIOR MEDIAL UPPER BACK
LOCATION DETAILED: LEFT INFERIOR UPPER BACK
LOCATION DETAILED: LEFT DORSAL WRIST
LOCATION DETAILED: RIGHT SUPERIOR PARIETAL SCALP
LOCATION DETAILED: INFERIOR THORACIC SPINE
LOCATION DETAILED: RIGHT DORSAL INDEX METACARPOPHALANGEAL JOINT
LOCATION DETAILED: LEFT ANKLE
LOCATION DETAILED: LEFT ANTERIOR SHOULDER
LOCATION DETAILED: LEFT DISTAL DORSAL FOREARM
LOCATION DETAILED: PERIUMBILICAL SKIN
LOCATION DETAILED: RIGHT CENTRAL MALAR CHEEK
LOCATION DETAILED: 2ND WEB SPACE RIGHT HAND
LOCATION DETAILED: RIGHT ANTERIOR PROXIMAL UPPER ARM
LOCATION DETAILED: LEFT POSTERIOR SHOULDER
LOCATION DETAILED: LEFT INFERIOR FOREHEAD
LOCATION DETAILED: RIGHT MEDIAL ZYGOMA
LOCATION DETAILED: RIGHT SUPERIOR UPPER BACK
LOCATION DETAILED: LEFT SUPERIOR UPPER BACK
LOCATION DETAILED: RIGHT ANTERIOR DISTAL THIGH
LOCATION DETAILED: INFERIOR MID FOREHEAD
LOCATION DETAILED: LEFT ANTERIOR PROXIMAL UPPER ARM

## 2024-06-03 ASSESSMENT — LOCATION SIMPLE DESCRIPTION DERM
LOCATION SIMPLE: UPPER BACK
LOCATION SIMPLE: LEFT FOREHEAD
LOCATION SIMPLE: RIGHT THIGH
LOCATION SIMPLE: RIGHT HAND
LOCATION SIMPLE: LEFT FOREARM
LOCATION SIMPLE: RIGHT ZYGOMA
LOCATION SIMPLE: LEFT ANKLE
LOCATION SIMPLE: INFERIOR FOREHEAD
LOCATION SIMPLE: RIGHT UPPER BACK
LOCATION SIMPLE: LEFT SHOULDER
LOCATION SIMPLE: ABDOMEN
LOCATION SIMPLE: NOSE
LOCATION SIMPLE: RIGHT UPPER ARM
LOCATION SIMPLE: NECK
LOCATION SIMPLE: RIGHT PRETIBIAL REGION
LOCATION SIMPLE: LEFT ZYGOMA
LOCATION SIMPLE: RIGHT CHEEK
LOCATION SIMPLE: RIGHT ANTERIOR NECK
LOCATION SIMPLE: LEFT UPPER ARM
LOCATION SIMPLE: LEFT WRIST
LOCATION SIMPLE: LEFT THIGH
LOCATION SIMPLE: CHEST
LOCATION SIMPLE: LEFT UPPER BACK
LOCATION SIMPLE: RIGHT FOREHEAD
LOCATION SIMPLE: SCALP

## 2024-06-03 ASSESSMENT — LOCATION ZONE DERM
LOCATION ZONE: FACE
LOCATION ZONE: NECK
LOCATION ZONE: SCALP
LOCATION ZONE: NOSE
LOCATION ZONE: TRUNK
LOCATION ZONE: HAND
LOCATION ZONE: ARM
LOCATION ZONE: LEG

## 2024-06-03 NOTE — PROCEDURE: DIAGNOSIS COMMENT
Detail Level: Simple
Comment: Accession # [id number]. Treated with Efudex BID x6 weeks.  NER.
Render Risk Assessment In Note?: no

## 2024-06-03 NOTE — PROCEDURE: LIQUID NITROGEN
Detail Level: Detailed
Render Post-Care Instructions In Note?: no
Duration Of Freeze Thaw-Cycle (Seconds): 0
Post-Care Instructions: I reviewed with the patient in detail post-care instructions. Patient is to wear sunprotection, and avoid picking at any of the treated lesions. Pt may apply Vaseline to crusted or scabbing areas.
Consent: The patient's consent was obtained and the risks including but not limited to risks of crusting, scabbing, blistering, scarring, darker or lighter pigmentary change, recurrence, incomplete removal and infection were discussed prior to the procedure.
Show Aperture Variable?: Yes

## 2024-08-15 NOTE — PROCEDURE: MEDICATION COUNSELING
Additional Safety/Bands: Cyclosporine Pregnancy And Lactation Text: This medication is Pregnancy Category C and it isn't know if it is safe during pregnancy. This medication is excreted in breast milk.

## 2024-08-19 NOTE — PROCEDURE: MEDICATION COUNSELING
yes 1-2 sessions Xolair Counseling:  Patient informed of potential adverse effects including but not limited to fever, muscle aches, rash and allergic reactions.  The patient verbalized understanding of the proper use and possible adverse effects of Xolair.  All of the patient's questions and concerns were addressed.

## 2024-11-20 NOTE — PROCEDURE: MEDICATION COUNSELING
pre-op instruction and orientation to procedure provided Humira Counseling:  I discussed with the patient the risks of adalimumab including but not limited to myelosuppression, immunosuppression, autoimmune hepatitis, demyelinating diseases, lymphoma, and serious infections.  The patient understands that monitoring is required including a PPD at baseline and must alert us or the primary physician if symptoms of infection or other concerning signs are noted.

## 2024-12-09 ENCOUNTER — APPOINTMENT (OUTPATIENT)
Dept: URBAN - METROPOLITAN AREA SURGERY 18 | Age: 79
Setting detail: DERMATOLOGY
End: 2024-12-15

## 2024-12-09 DIAGNOSIS — D485 NEOPLASM OF UNCERTAIN BEHAVIOR OF SKIN: ICD-10-CM

## 2024-12-09 DIAGNOSIS — D18.0 HEMANGIOMA: ICD-10-CM

## 2024-12-09 DIAGNOSIS — L57.0 ACTINIC KERATOSIS: ICD-10-CM

## 2024-12-09 DIAGNOSIS — Z86.006 PERSONAL HISTORY OF MELANOMA IN-SITU: ICD-10-CM

## 2024-12-09 DIAGNOSIS — Z85.828 PERSONAL HISTORY OF OTHER MALIGNANT NEOPLASM OF SKIN: ICD-10-CM

## 2024-12-09 DIAGNOSIS — Z71.89 OTHER SPECIFIED COUNSELING: ICD-10-CM

## 2024-12-09 DIAGNOSIS — L82.1 OTHER SEBORRHEIC KERATOSIS: ICD-10-CM

## 2024-12-09 DIAGNOSIS — L82.0 INFLAMED SEBORRHEIC KERATOSIS: ICD-10-CM

## 2024-12-09 DIAGNOSIS — Z85.820 PERSONAL HISTORY OF MALIGNANT MELANOMA OF SKIN: ICD-10-CM

## 2024-12-09 DIAGNOSIS — D22 MELANOCYTIC NEVI: ICD-10-CM

## 2024-12-09 DIAGNOSIS — L81.4 OTHER MELANIN HYPERPIGMENTATION: ICD-10-CM

## 2024-12-09 PROBLEM — D22.61 MELANOCYTIC NEVI OF RIGHT UPPER LIMB, INCLUDING SHOULDER: Status: ACTIVE | Noted: 2024-12-09

## 2024-12-09 PROBLEM — D22.62 MELANOCYTIC NEVI OF LEFT UPPER LIMB, INCLUDING SHOULDER: Status: ACTIVE | Noted: 2024-12-09

## 2024-12-09 PROBLEM — D18.01 HEMANGIOMA OF SKIN AND SUBCUTANEOUS TISSUE: Status: ACTIVE | Noted: 2024-12-09

## 2024-12-09 PROBLEM — D22.5 MELANOCYTIC NEVI OF TRUNK: Status: ACTIVE | Noted: 2024-12-09

## 2024-12-09 PROBLEM — D22.71 MELANOCYTIC NEVI OF RIGHT LOWER LIMB, INCLUDING HIP: Status: ACTIVE | Noted: 2024-12-09

## 2024-12-09 PROBLEM — D22.72 MELANOCYTIC NEVI OF LEFT LOWER LIMB, INCLUDING HIP: Status: ACTIVE | Noted: 2024-12-09

## 2024-12-09 PROBLEM — D48.5 NEOPLASM OF UNCERTAIN BEHAVIOR OF SKIN: Status: ACTIVE | Noted: 2024-12-09

## 2024-12-09 PROCEDURE — 17004 DESTROY PREMAL LESIONS 15/>: CPT

## 2024-12-09 PROCEDURE — 17110 DESTRUCT B9 LESION 1-14: CPT | Mod: 59

## 2024-12-09 PROCEDURE — 11102 TANGNTL BX SKIN SINGLE LES: CPT | Mod: 59

## 2024-12-09 PROCEDURE — OTHER COUNSELING: OTHER

## 2024-12-09 PROCEDURE — OTHER BIOPSY BY SHAVE METHOD: OTHER

## 2024-12-09 PROCEDURE — 99213 OFFICE O/P EST LOW 20 MIN: CPT | Mod: 25

## 2024-12-09 PROCEDURE — OTHER LIQUID NITROGEN: OTHER

## 2024-12-09 ASSESSMENT — LOCATION DETAILED DESCRIPTION DERM
LOCATION DETAILED: RIGHT ELBOW
LOCATION DETAILED: LEFT VENTRAL LATERAL DISTAL FOREARM
LOCATION DETAILED: RIGHT RADIAL DORSAL HAND
LOCATION DETAILED: LEFT ANTERIOR PROXIMAL UPPER ARM
LOCATION DETAILED: LEFT MEDIAL ELBOW
LOCATION DETAILED: RIGHT DISTAL DORSAL FOREARM
LOCATION DETAILED: RIGHT LATERAL CANTHUS
LOCATION DETAILED: UPPER STERNUM
LOCATION DETAILED: RIGHT LATERAL FOREHEAD
LOCATION DETAILED: RIGHT PROXIMAL PRETIBIAL REGION
LOCATION DETAILED: RIGHT SUPERIOR LATERAL MALAR CHEEK
LOCATION DETAILED: RIGHT INFERIOR MEDIAL UPPER BACK
LOCATION DETAILED: RIGHT LATERAL SUPERIOR CHEST
LOCATION DETAILED: LEFT INFERIOR UPPER BACK
LOCATION DETAILED: INFERIOR THORACIC SPINE
LOCATION DETAILED: RIGHT SUPERIOR FOREHEAD
LOCATION DETAILED: LEFT ELBOW
LOCATION DETAILED: LEFT VENTRAL DISTAL FOREARM
LOCATION DETAILED: RIGHT INFERIOR FOREHEAD
LOCATION DETAILED: RIGHT LATERAL MALAR CHEEK
LOCATION DETAILED: LEFT FOREHEAD
LOCATION DETAILED: PERIUMBILICAL SKIN
LOCATION DETAILED: RIGHT PROXIMAL DORSAL FOREARM
LOCATION DETAILED: LEFT ANTERIOR DISTAL THIGH
LOCATION DETAILED: LEFT SUPERIOR HELIX
LOCATION DETAILED: GLABELLA
LOCATION DETAILED: RIGHT SUPERIOR LATERAL FOREHEAD
LOCATION DETAILED: NASAL DORSUM
LOCATION DETAILED: RIGHT DISTAL PRETIBIAL REGION
LOCATION DETAILED: LEFT ANTERIOR SHOULDER
LOCATION DETAILED: RIGHT INFERIOR UPPER BACK
LOCATION DETAILED: EPIGASTRIC SKIN
LOCATION DETAILED: LEFT RADIAL DORSAL HAND
LOCATION DETAILED: RIGHT ANTERIOR DISTAL THIGH
LOCATION DETAILED: LEFT PROXIMAL DORSAL FOREARM
LOCATION DETAILED: LEFT ANTERIOR PROXIMAL THIGH
LOCATION DETAILED: RIGHT INFERIOR TEMPLE
LOCATION DETAILED: LEFT INFERIOR LATERAL FOREHEAD
LOCATION DETAILED: RIGHT ANTERIOR PROXIMAL THIGH
LOCATION DETAILED: INFERIOR MID FOREHEAD
LOCATION DETAILED: LEFT ULNAR DORSAL HAND
LOCATION DETAILED: LEFT SUPERIOR LATERAL NECK
LOCATION DETAILED: RIGHT INFERIOR MEDIAL FOREHEAD
LOCATION DETAILED: RIGHT INFERIOR MEDIAL MALAR CHEEK
LOCATION DETAILED: STERNAL NOTCH
LOCATION DETAILED: RIGHT DISTAL RADIAL DORSAL FOREARM
LOCATION DETAILED: NASAL TIP
LOCATION DETAILED: RIGHT MEDIAL FOREHEAD
LOCATION DETAILED: RIGHT ANTERIOR PROXIMAL UPPER ARM

## 2024-12-09 ASSESSMENT — LOCATION SIMPLE DESCRIPTION DERM
LOCATION SIMPLE: LEFT THIGH
LOCATION SIMPLE: LEFT FOREARM
LOCATION SIMPLE: LEFT UPPER ARM
LOCATION SIMPLE: CHEST
LOCATION SIMPLE: LEFT FOREHEAD
LOCATION SIMPLE: NECK
LOCATION SIMPLE: ABDOMEN
LOCATION SIMPLE: GLABELLA
LOCATION SIMPLE: RIGHT CHEEK
LOCATION SIMPLE: RIGHT EYELID
LOCATION SIMPLE: UPPER BACK
LOCATION SIMPLE: RIGHT PRETIBIAL REGION
LOCATION SIMPLE: LEFT ELBOW
LOCATION SIMPLE: RIGHT THIGH
LOCATION SIMPLE: INFERIOR FOREHEAD
LOCATION SIMPLE: NOSE
LOCATION SIMPLE: LEFT HAND
LOCATION SIMPLE: RIGHT TEMPLE
LOCATION SIMPLE: RIGHT FOREARM
LOCATION SIMPLE: RIGHT FOREHEAD
LOCATION SIMPLE: LEFT UPPER BACK
LOCATION SIMPLE: LEFT SHOULDER
LOCATION SIMPLE: LEFT EAR
LOCATION SIMPLE: RIGHT UPPER ARM
LOCATION SIMPLE: RIGHT HAND
LOCATION SIMPLE: RIGHT ELBOW
LOCATION SIMPLE: RIGHT UPPER BACK

## 2024-12-09 ASSESSMENT — LOCATION ZONE DERM
LOCATION ZONE: EAR
LOCATION ZONE: FACE
LOCATION ZONE: NOSE
LOCATION ZONE: NECK
LOCATION ZONE: EYELID
LOCATION ZONE: HAND
LOCATION ZONE: TRUNK
LOCATION ZONE: ARM
LOCATION ZONE: LEG

## 2024-12-09 NOTE — PROCEDURE: COUNSELING
Quality 137: Melanoma: Continuity Of Care - Recall System: Patient information entered into a recall system that includes: target date for the next exam specified AND a process to follow up with patients regarding missed or unscheduled appointments
When Should The Patient Follow-Up For Their Next Full-Body Skin Exam?: 3 Months
Detail Level: Detailed
Detail Level: Generalized
Detail Level: Zone
Detail Level: Simple

## 2024-12-09 NOTE — HPI: MELANOMA F/U (HISTORY OF MALIGNANT MELANOMA)
What Is The Reason For Today's Visit?: History of Melanoma
Year Excised?: 11/2022. Hx of MM on L forearm, excised in 2015, unknown depth.

## 2024-12-09 NOTE — PROCEDURE: LIQUID NITROGEN
Consent: The patient's consent was obtained and the risks including but not limited to risks of crusting, scabbing, blistering, scarring, darker or lighter pigmentary change, recurrence, incomplete removal and infection were discussed prior to the procedure.
Post-Care Instructions: I reviewed with the patient in detail post-care instructions. Patient is to wear sunprotection, and avoid picking at any of the treated lesions. Pt may apply Vaseline to crusted or scabbing areas.
Duration Of Freeze Thaw-Cycle (Seconds): 0
Render Note In Bullet Format When Appropriate: No
Show Aperture Variable?: Yes
Detail Level: Detailed
Medical Necessity Clause: This procedure was medically necessary because the lesions that were treated were:
Medical Necessity Information: It is in your best interest to select a reason for this procedure from the list below. All of these items fulfill various CMS LCD requirements except the new and changing color options.
Spray Paint Text: The liquid nitrogen was applied to the skin utilizing a spray paint frosting technique.

## 2024-12-09 NOTE — PROCEDURE: BIOPSY BY SHAVE METHOD
Detail Level: Detailed
Depth Of Biopsy: dermis
Was A Bandage Applied: Yes
Size Of Lesion In Cm: 0.6
X Size Of Lesion In Cm: 0.5
Biopsy Type: H and E
Biopsy Method: Dermablade
Anesthesia Type: 1% lidocaine with epinephrine and a 1:10 solution of 8.4% sodium bicarbonate
Additional Anesthesia Volume In Cc (Will Not Render If 0): 0
Hemostasis: Aluminum Chloride
Wound Care: Petrolatum
Dressing: bandage
Destruction After The Procedure: No
Type Of Destruction Used: Curettage
Curettage Text: The wound bed was treated with curettage after the biopsy was performed.
Cryotherapy Text: The wound bed was treated with cryotherapy after the biopsy was performed.
Electrodesiccation Text: The wound bed was treated with electrodesiccation after the biopsy was performed.
Electrodesiccation And Curettage Text: The wound bed was treated with electrodesiccation and curettage after the biopsy was performed.
Silver Nitrate Text: The wound bed was treated with silver nitrate after the biopsy was performed.
Lab: -[id number]
Medical Necessity Information: It is in your best interest to select a reason for this procedure from the list below. All of these items fulfill various CMS LCD requirements except the new and changing color options.
Consent: Written consent was obtained and risks were reviewed including but not limited to scarring, infection, bleeding, scabbing, incomplete removal, nerve damage and allergy to anesthesia.
Post-Care Instructions: I reviewed with the patient in detail post-care instructions. Patient is to keep the biopsy site dry overnight, and then apply bacitracin twice daily until healed. Patient may apply hydrogen peroxide soaks to remove any crusting.
Notification Instructions: Patient will be notified of biopsy results. However, patient instructed to call the office if not contacted within 2 weeks.
Billing Type: Third-Party Bill
Information: Selecting Yes will display possible errors in your note based on the variables you have selected. This validation is only offered as a suggestion for you. PLEASE NOTE THAT THE VALIDATION TEXT WILL BE REMOVED WHEN YOU FINALIZE YOUR NOTE. IF YOU WANT TO FAX A PRELIMINARY NOTE YOU WILL NEED TO TOGGLE THIS TO 'NO' IF YOU DO NOT WANT IT IN YOUR FAXED NOTE.

## 2024-12-25 NOTE — PROCEDURE: MOHS SURGERY
Diagnostic/Therapeutic Consent (Marginal Mandibular)/Introductory Paragraph: The rationale for Mohs was explained to the patient and consent was obtained. The risks, benefits and alternatives to therapy were discussed in detail. Specifically, the risks of damage to the marginal mandibular branch of the facial nerve, infection, scarring, bleeding, prolonged wound healing, incomplete removal, allergy to anesthesia, and recurrence were addressed. Prior to the procedure, the treatment site was clearly identified and confirmed by the patient. All components of Universal Protocol/PAUSE Rule completed.

## 2025-01-17 NOTE — PROCEDURE: MOHS SURGERY
14-Jan-2025 12:22 14-Jan-2025 12:23 Scc Moderately Differentiated Histology Text: Full thickness keratinocyte atypia is observed microscopically. Focal dyskeratosis and apoptosis of keratinocytes is observed. The epidermis is acanthotic with growth of atypical keratinocytes beyond the level of the sebaceous glands, many groups of keratinocytes display abnormal or absent keratin formation.

## 2025-03-03 ENCOUNTER — APPOINTMENT (OUTPATIENT)
Dept: URBAN - METROPOLITAN AREA SURGERY 18 | Age: 80
Setting detail: DERMATOLOGY
End: 2025-03-03

## 2025-03-03 DIAGNOSIS — D485 NEOPLASM OF UNCERTAIN BEHAVIOR OF SKIN: ICD-10-CM

## 2025-03-03 DIAGNOSIS — Z85.828 PERSONAL HISTORY OF OTHER MALIGNANT NEOPLASM OF SKIN: ICD-10-CM

## 2025-03-03 DIAGNOSIS — Z71.89 OTHER SPECIFIED COUNSELING: ICD-10-CM

## 2025-03-03 DIAGNOSIS — Z86.006 PERSONAL HISTORY OF MELANOMA IN-SITU: ICD-10-CM

## 2025-03-03 DIAGNOSIS — L82.1 OTHER SEBORRHEIC KERATOSIS: ICD-10-CM

## 2025-03-03 DIAGNOSIS — Z85.820 PERSONAL HISTORY OF MALIGNANT MELANOMA OF SKIN: ICD-10-CM

## 2025-03-03 DIAGNOSIS — D18.0 HEMANGIOMA: ICD-10-CM

## 2025-03-03 DIAGNOSIS — D22 MELANOCYTIC NEVI: ICD-10-CM

## 2025-03-03 DIAGNOSIS — L81.4 OTHER MELANIN HYPERPIGMENTATION: ICD-10-CM

## 2025-03-03 DIAGNOSIS — L57.0 ACTINIC KERATOSIS: ICD-10-CM

## 2025-03-03 PROBLEM — D22.71 MELANOCYTIC NEVI OF RIGHT LOWER LIMB, INCLUDING HIP: Status: ACTIVE | Noted: 2025-03-03

## 2025-03-03 PROBLEM — D22.5 MELANOCYTIC NEVI OF TRUNK: Status: ACTIVE | Noted: 2025-03-03

## 2025-03-03 PROBLEM — D48.5 NEOPLASM OF UNCERTAIN BEHAVIOR OF SKIN: Status: ACTIVE | Noted: 2025-03-03

## 2025-03-03 PROBLEM — D22.61 MELANOCYTIC NEVI OF RIGHT UPPER LIMB, INCLUDING SHOULDER: Status: ACTIVE | Noted: 2025-03-03

## 2025-03-03 PROBLEM — D22.72 MELANOCYTIC NEVI OF LEFT LOWER LIMB, INCLUDING HIP: Status: ACTIVE | Noted: 2025-03-03

## 2025-03-03 PROBLEM — D22.62 MELANOCYTIC NEVI OF LEFT UPPER LIMB, INCLUDING SHOULDER: Status: ACTIVE | Noted: 2025-03-03

## 2025-03-03 PROBLEM — D18.01 HEMANGIOMA OF SKIN AND SUBCUTANEOUS TISSUE: Status: ACTIVE | Noted: 2025-03-03

## 2025-03-03 PROCEDURE — 11103 TANGNTL BX SKIN EA SEP/ADDL: CPT

## 2025-03-03 PROCEDURE — 99213 OFFICE O/P EST LOW 20 MIN: CPT | Mod: 25

## 2025-03-03 PROCEDURE — 17004 DESTROY PREMAL LESIONS 15/>: CPT

## 2025-03-03 PROCEDURE — OTHER BIOPSY BY SHAVE METHOD: OTHER

## 2025-03-03 PROCEDURE — OTHER LIQUID NITROGEN: OTHER

## 2025-03-03 PROCEDURE — 11102 TANGNTL BX SKIN SINGLE LES: CPT | Mod: 59

## 2025-03-03 PROCEDURE — OTHER COUNSELING: OTHER

## 2025-03-03 ASSESSMENT — LOCATION SIMPLE DESCRIPTION DERM
LOCATION SIMPLE: LEFT ANTERIOR NECK
LOCATION SIMPLE: RIGHT TEMPLE
LOCATION SIMPLE: RIGHT THIGH
LOCATION SIMPLE: RIGHT ANTERIOR NECK
LOCATION SIMPLE: RIGHT CHEEK
LOCATION SIMPLE: UPPER BACK
LOCATION SIMPLE: NOSE
LOCATION SIMPLE: LEFT HAND
LOCATION SIMPLE: INFERIOR FOREHEAD
LOCATION SIMPLE: RIGHT HAND
LOCATION SIMPLE: CHEST
LOCATION SIMPLE: RIGHT PRETIBIAL REGION
LOCATION SIMPLE: LEFT CHEEK
LOCATION SIMPLE: LEFT THIGH
LOCATION SIMPLE: LEFT FOREHEAD
LOCATION SIMPLE: RIGHT UPPER ARM
LOCATION SIMPLE: RIGHT UPPER BACK
LOCATION SIMPLE: LEFT FOREARM
LOCATION SIMPLE: LEFT POSTERIOR UPPER ARM
LOCATION SIMPLE: ABDOMEN
LOCATION SIMPLE: NECK
LOCATION SIMPLE: RIGHT FOREHEAD
LOCATION SIMPLE: LEFT UPPER ARM
LOCATION SIMPLE: LEFT SHOULDER
LOCATION SIMPLE: LEFT ZYGOMA
LOCATION SIMPLE: LEFT PRETIBIAL REGION
LOCATION SIMPLE: LEFT UPPER BACK

## 2025-03-03 ASSESSMENT — LOCATION DETAILED DESCRIPTION DERM
LOCATION DETAILED: LEFT ANTERIOR SHOULDER
LOCATION DETAILED: LEFT LATERAL MANDIBULAR CHEEK
LOCATION DETAILED: UPPER STERNUM
LOCATION DETAILED: LEFT FOREHEAD
LOCATION DETAILED: RIGHT INFERIOR CENTRAL MALAR CHEEK
LOCATION DETAILED: RIGHT ANTERIOR PROXIMAL THIGH
LOCATION DETAILED: LEFT PROXIMAL DORSAL FOREARM
LOCATION DETAILED: RIGHT INFERIOR MEDIAL UPPER BACK
LOCATION DETAILED: LEFT SUPERIOR LATERAL NECK
LOCATION DETAILED: LEFT INFERIOR LATERAL NECK
LOCATION DETAILED: LEFT NASAL DORSUM
LOCATION DETAILED: LEFT INFERIOR LATERAL MALAR CHEEK
LOCATION DETAILED: RIGHT PROXIMAL PRETIBIAL REGION
LOCATION DETAILED: LEFT DISTAL POSTERIOR UPPER ARM
LOCATION DETAILED: LEFT ANTERIOR PROXIMAL THIGH
LOCATION DETAILED: LEFT MEDIAL ZYGOMA
LOCATION DETAILED: LEFT DISTAL PRETIBIAL REGION
LOCATION DETAILED: INFERIOR THORACIC SPINE
LOCATION DETAILED: INFERIOR MID FOREHEAD
LOCATION DETAILED: LEFT RADIAL DORSAL HAND
LOCATION DETAILED: RIGHT ANTERIOR DISTAL THIGH
LOCATION DETAILED: PERIUMBILICAL SKIN
LOCATION DETAILED: LEFT ANTERIOR DISTAL THIGH
LOCATION DETAILED: RIGHT INFERIOR LATERAL MALAR CHEEK
LOCATION DETAILED: RIGHT SUPERIOR FOREHEAD
LOCATION DETAILED: RIGHT ANTERIOR PROXIMAL UPPER ARM
LOCATION DETAILED: LEFT INFERIOR FOREHEAD
LOCATION DETAILED: RIGHT INFERIOR LATERAL NECK
LOCATION DETAILED: RIGHT CENTRAL TEMPLE
LOCATION DETAILED: RIGHT RADIAL DORSAL HAND
LOCATION DETAILED: LEFT PROXIMAL PRETIBIAL REGION
LOCATION DETAILED: LEFT ANTERIOR PROXIMAL UPPER ARM
LOCATION DETAILED: LEFT ULNAR DORSAL HAND
LOCATION DETAILED: LEFT INFERIOR CENTRAL MALAR CHEEK
LOCATION DETAILED: EPIGASTRIC SKIN
LOCATION DETAILED: RIGHT NASAL ROOT
LOCATION DETAILED: RIGHT MEDIAL PROXIMAL PRETIBIAL REGION
LOCATION DETAILED: RIGHT MID PREAURICULAR CHEEK
LOCATION DETAILED: LEFT INFERIOR UPPER BACK

## 2025-03-03 ASSESSMENT — LOCATION ZONE DERM
LOCATION ZONE: FACE
LOCATION ZONE: LEG
LOCATION ZONE: ARM
LOCATION ZONE: TRUNK
LOCATION ZONE: NOSE
LOCATION ZONE: NECK
LOCATION ZONE: HAND

## 2025-03-03 NOTE — HPI: MELANOMA F/U (HISTORY OF MALIGNANT MELANOMA)
What Is The Reason For Today's Visit?: History of Melanoma
Year Excised?: 01/2023. No SNL bx. Treated with Mohs. Hx of MM, R forearm. Excised in 2016. Unknown depth. No SNL bx noted. Treated with excision
05-Jan-2022

## 2025-03-03 NOTE — PROCEDURE: LIQUID NITROGEN
Show Applicator Variable?: Yes
Duration Of Freeze Thaw-Cycle (Seconds): 0
Consent: The patient's consent was obtained and the risks including but not limited to risks of crusting, scabbing, blistering, scarring, darker or lighter pigmentary change, recurrence, incomplete removal and infection were discussed prior to the procedure.
Post-Care Instructions: I reviewed with the patient in detail post-care instructions. Patient is to wear sunprotection, and avoid picking at any of the treated lesions. Pt may apply Vaseline to crusted or scabbing areas.
Detail Level: Detailed
Render Post-Care Instructions In Note?: no

## 2025-03-03 NOTE — PROCEDURE: BIOPSY BY SHAVE METHOD
Detail Level: Detailed
Depth Of Biopsy: dermis
Was A Bandage Applied: Yes
Size Of Lesion In Cm: 1.5
X Size Of Lesion In Cm: 1.1
Biopsy Type: H and E
Biopsy Method: Dermablade
Anesthesia Type: 1% lidocaine with epinephrine and a 1:10 solution of 8.4% sodium bicarbonate
Anesthesia Volume In Cc: 0.5
Additional Anesthesia Volume In Cc (Will Not Render If 0): 0
Hemostasis: Aluminum Chloride
Wound Care: Petrolatum
Dressing: bandage
Destruction After The Procedure: No
Type Of Destruction Used: Curettage
Curettage Text: The wound bed was treated with curettage after the biopsy was performed.
Cryotherapy Text: The wound bed was treated with cryotherapy after the biopsy was performed.
Electrodesiccation Text: The wound bed was treated with electrodesiccation after the biopsy was performed.
Electrodesiccation And Curettage Text: The wound bed was treated with electrodesiccation and curettage after the biopsy was performed.
Silver Nitrate Text: The wound bed was treated with silver nitrate after the biopsy was performed.
Lab: -[id number]
Path Notes (To The Dermatopathologist): Nicolasa
Medical Necessity Information: It is in your best interest to select a reason for this procedure from the list below. All of these items fulfill various CMS LCD requirements except the new and changing color options.
Consent: Written consent was obtained and risks were reviewed including but not limited to scarring, infection, bleeding, scabbing, incomplete removal, nerve damage and allergy to anesthesia.
Post-Care Instructions: I reviewed with the patient in detail post-care instructions. Patient is to keep the biopsy site dry overnight, and then apply bacitracin twice daily until healed. Patient may apply hydrogen peroxide soaks to remove any crusting.
Notification Instructions: Patient will be notified of biopsy results. However, patient instructed to call the office if not contacted within 2 weeks.
Billing Type: Third-Party Bill
Information: Selecting Yes will display possible errors in your note based on the variables you have selected. This validation is only offered as a suggestion for you. PLEASE NOTE THAT THE VALIDATION TEXT WILL BE REMOVED WHEN YOU FINALIZE YOUR NOTE. IF YOU WANT TO FAX A PRELIMINARY NOTE YOU WILL NEED TO TOGGLE THIS TO 'NO' IF YOU DO NOT WANT IT IN YOUR FAXED NOTE.
Size Of Lesion In Cm: 0.7
X Size Of Lesion In Cm: 0.6
Size Of Lesion In Cm: 0.8

## 2025-03-13 ENCOUNTER — APPOINTMENT (OUTPATIENT)
Dept: URBAN - METROPOLITAN AREA SURGERY 18 | Age: 80
Setting detail: DERMATOLOGY
End: 2025-03-13

## 2025-03-13 VITALS — DIASTOLIC BLOOD PRESSURE: 62 MMHG | HEART RATE: 78 BPM | SYSTOLIC BLOOD PRESSURE: 111 MMHG

## 2025-03-13 PROBLEM — D04.39 CARCINOMA IN SITU OF SKIN OF OTHER PARTS OF FACE: Status: ACTIVE | Noted: 2025-03-13

## 2025-03-13 PROCEDURE — OTHER MOHS SURGERY: OTHER

## 2025-03-13 PROCEDURE — 17312 MOHS ADDL STAGE: CPT | Mod: 79

## 2025-03-13 PROCEDURE — OTHER COUNSELING: OTHER

## 2025-03-13 PROCEDURE — 13132 CMPLX RPR F/C/C/M/N/AX/G/H/F: CPT | Mod: 79

## 2025-03-13 PROCEDURE — OTHER MIPS QUALITY: OTHER

## 2025-03-13 PROCEDURE — 17311 MOHS 1 STAGE H/N/HF/G: CPT | Mod: 79

## 2025-03-13 NOTE — PROCEDURE: MOHS SURGERY
Mohs Case Number: 
Date Of Previous Biopsy (Optional): 03/03/2025
Previous Accession (Optional): [id number]
Biopsy Photograph Reviewed: Yes
Referring Physician (Optional): Valerie Mora MD
Consent Type: Consent 1 (Standard)
Eye Shield Used: No
Surgeon Performing Repair (Optional): Dr. Edmar Mora
Initial Size Of Lesion: 1.5
X Size Of Lesion In Cm (Optional): 1.2
Number Of Stages: 3
Primary Defect Length In Cm (Final Defect Size - Required For Flaps/Grafts): 2.2
Primary Defect Depth In Cm (Optional But Required For Some Insurers): 0
Repair Type: Complex Repair
Which Instrument Did You Use For Dermabrasion?: Wire Brush
Which Eyelid Repair Cpt Are You Using?: 03300
Oculoplastic Surgeon Procedure Text (A): After obtaining clear surgical margins the patient was sent to oculoplastics for surgical repair.  The patient understands they will receive post-surgical care and follow-up from the referring physician's office.
Otolaryngologist Procedure Text (A): After obtaining clear surgical margins the patient was sent to otolaryngology for surgical repair.  The patient understands they will receive post-surgical care and follow-up from the referring physician's office.
Plastic Surgeon Procedure Text (A): After obtaining clear surgical margins the patient was sent to plastics for surgical repair.  The patient understands they will receive post-surgical care and follow-up from the referring physician's office.
Mid-Level Procedure Text (A): After obtaining clear surgical margins the patient was sent to a mid-level provider for surgical repair.  The patient understands they will receive post-surgical care and follow-up from the mid-level provider.
Provider Procedure Text (A): After obtaining clear surgical margins the defect was repaired by another provider.
Asc Procedure Text (A): After obtaining clear surgical margins the patient was sent to an ASC for surgical repair.  The patient understands they will receive post-surgical care and follow-up from the ASC physician.
Simple / Intermediate / Complex Repair - Final Wound Length In Cm: 5.1
Deep Sutures: 4-0 PGCL
Epidermal Sutures: 5-0 Plain Gut
Suturegard Retention Suture: 2-0 Nylon
Retention Suture Bite Size: 3 mm
Length To Time In Minutes Device Was In Place: 10
Number Of Hemigard Strips Per Side: 1
Distance Of Undermining In Cm (Required): 1.4
Undermining Type: Entire Wound
Debridement Text: The wound edges were debrided prior to proceeding with the closure to facilitate wound healing.
Helical Rim Text: The closure involved the helical rim.
Vermilion Border Text: The closure involved the vermilion border.
Nostril Rim Text: The closure involved the nostril rim.
Retention Suture Text: Retention sutures were placed to support the closure and prevent dehiscence.
Location Indication Override (Is Already Calculated Based On Selected Body Location): Area M
Area H Indication Text: Tumors in this location are included in Area H (eyelids, eyebrows, nose, lips, chin, ear, pre-auricular, post-auricular, temple, genitalia, hands, feet, ankles and areola).  Tissue conservation is critical in these anatomic locations.
Area M Indication Text: Tumors in this location are included in Area M (cheek, forehead, scalp, neck, jawline and pretibial skin).  Mohs surgery is indicated for tumors in these anatomic locations.
Area L Indication Text: Tumors in this location are included in Area L (trunk and extremities).  Mohs surgery is indicated for larger tumors, or tumors with aggressive histologic features, in these anatomic locations.
Tumor Debulked?: scalpel
Depth Of Tumor Invasion (For Histology): epidermis
Perineural Invasion (For Histology - Be Specific If Possible): absent
Special Stains Stage 1 - Results: Base On Clearance Noted Above
Stage 2: Additional Anesthesia Type: 1% lidocaine with 1:100,000 epinephrine and 408mcg clindamycin/ml and a 1:10 solution of 8.4% sodium bicarbonate
Staging Info: By selecting yes to the question above you will include information on AJCC 8 tumor staging in your Mohs note. Information on tumor staging will be automatically added for SCCs on the head and neck. AJCC 8 includes tumor size, tumor depth, perineural involvement and bone invasion.
Tumor Depth: Less than 6mm from granular layer and no invasion beyond the subcutaneous fat
Why Was The Change Made?: Please Select the Appropriate Response
Medical Necessity Statement: Based on my medical judgement, Mohs surgery is the most appropriate treatment for this cancer compared to other treatments.
Alternatives Discussed Intro (Do Not Add Period): I discussed alternative treatments to Mohs surgery and specifically discussed the risks and benefits of
Consent 1/Introductory Paragraph: The rationale for Mohs was explained to the patient and consent was obtained. The risks, benefits and alternatives to therapy were discussed in detail. Specifically, the risks of infection, scarring, bleeding, prolonged wound healing, incomplete removal, allergy to anesthesia, nerve injury and recurrence were addressed. Prior to the procedure, the treatment site was clearly identified and confirmed by the patient. All components of Universal Protocol/PAUSE Rule completed.
Consent 2/Introductory Paragraph: Mohs surgery was explained to the patient and consent was obtained. The risks, benefits and alternatives to therapy were discussed in detail. Specifically, the risks of infection, scarring, bleeding, prolonged wound healing, incomplete removal, allergy to anesthesia, nerve injury and recurrence were addressed. Prior to the procedure, the treatment site was clearly identified and confirmed by the patient. All components of Universal Protocol/PAUSE Rule completed.
Consent 3/Introductory Paragraph: I gave the patient a chance to ask questions they had about the procedure.  Following this I explained the Mohs procedure and consent was obtained. The risks, benefits and alternatives to therapy were discussed in detail. Specifically, the risks of infection, scarring, bleeding, prolonged wound healing, incomplete removal, allergy to anesthesia, nerve injury and recurrence were addressed. Prior to the procedure, the treatment site was clearly identified and confirmed by the patient. All components of Universal Protocol/PAUSE Rule completed.
Consent (Temporal Branch)/Introductory Paragraph: The rationale for Mohs was explained to the patient and consent was obtained. The risks, benefits and alternatives to therapy were discussed in detail. Specifically, the risks of damage to the temporal branch of the facial nerve, infection, scarring, bleeding, prolonged wound healing, incomplete removal, allergy to anesthesia, and recurrence were addressed. Prior to the procedure, the treatment site was clearly identified and confirmed by the patient. All components of Universal Protocol/PAUSE Rule completed.
Consent (Marginal Mandibular)/Introductory Paragraph: The rationale for Mohs was explained to the patient and consent was obtained. The risks, benefits and alternatives to therapy were discussed in detail. Specifically, the risks of damage to the marginal mandibular branch of the facial nerve, infection, scarring, bleeding, prolonged wound healing, incomplete removal, allergy to anesthesia, and recurrence were addressed. Prior to the procedure, the treatment site was clearly identified and confirmed by the patient. All components of Universal Protocol/PAUSE Rule completed.
Consent (Spinal Accessory)/Introductory Paragraph: The rationale for Mohs was explained to the patient and consent was obtained. The risks, benefits and alternatives to therapy were discussed in detail. Specifically, the risks of damage to the spinal accessory nerve, infection, scarring, bleeding, prolonged wound healing, incomplete removal, allergy to anesthesia, and recurrence were addressed. Prior to the procedure, the treatment site was clearly identified and confirmed by the patient. All components of Universal Protocol/PAUSE Rule completed.
Consent (Near Eyelid Margin)/Introductory Paragraph: The rationale for Mohs was explained to the patient and consent was obtained. The risks, benefits and alternatives to therapy were discussed in detail. Specifically, the risks of ectropion or eyelid deformity, infection, scarring, bleeding, prolonged wound healing, incomplete removal, allergy to anesthesia, nerve injury and recurrence were addressed. Prior to the procedure, the treatment site was clearly identified and confirmed by the patient. All components of Universal Protocol/PAUSE Rule completed.
Consent (Ear)/Introductory Paragraph: The rationale for Mohs was explained to the patient and consent was obtained. The risks, benefits and alternatives to therapy were discussed in detail. Specifically, the risks of ear deformity, infection, scarring, bleeding, prolonged wound healing, incomplete removal, allergy to anesthesia, nerve injury and recurrence were addressed. Prior to the procedure, the treatment site was clearly identified and confirmed by the patient. All components of Universal Protocol/PAUSE Rule completed.
Consent (Nose)/Introductory Paragraph: The rationale for Mohs was explained to the patient and consent was obtained. The risks, benefits and alternatives to therapy were discussed in detail. Specifically, the risks of nasal deformity, changes in the flow of air through the nose, infection, scarring, bleeding, prolonged wound healing, incomplete removal, allergy to anesthesia, nerve injury and recurrence were addressed. Prior to the procedure, the treatment site was clearly identified and confirmed by the patient. All components of Universal Protocol/PAUSE Rule completed.
Consent (Lip)/Introductory Paragraph: The rationale for Mohs was explained to the patient and consent was obtained. The risks, benefits and alternatives to therapy were discussed in detail. Specifically, the risks of lip deformity, changes in the oral aperture, infection, scarring, bleeding, prolonged wound healing, incomplete removal, allergy to anesthesia, nerve injury and recurrence were addressed. Prior to the procedure, the treatment site was clearly identified and confirmed by the patient. All components of Universal Protocol/PAUSE Rule completed.
Consent (Scalp)/Introductory Paragraph: The rationale for Mohs was explained to the patient and consent was obtained. The risks, benefits and alternatives to therapy were discussed in detail. Specifically, the risks of changes in hair growth pattern secondary to repair, infection, scarring, bleeding, prolonged wound healing, incomplete removal, allergy to anesthesia, nerve injury and recurrence were addressed. Prior to the procedure, the treatment site was clearly identified and confirmed by the patient. All components of Universal Protocol/PAUSE Rule completed.
Detail Level: Detailed
Postop Diagnosis: same
Anesthesia Volume In Cc: 6
Hemostasis: Electrodesiccation
Estimated Blood Loss (Cc): minimal
Stage 8: Additional Anesthesia Type: 1% lidocaine with epinephrine
Repair Anesthesia Method: local infiltration
Brow Lift Text: A midfrontal incision was made medially to the defect to allow access to the tissues just superior to the left eyebrow. Following careful dissection inferiorly in a supraperiosteal plane to the level of the left eyebrow, several 3-0 monocryl sutures were used to resuspend the eyebrow orbicularis oculi muscular unit to the superior frontal bone periosteum. This resulted in an appropriate reapproximation of static eyebrow symmetry and correction of the left brow ptosis.
Epidermal Closure: running cuticular
Suturegard Intro: Intraoperative tissue expansion was performed, utilizing the SUTUREGARD device, in order to reduce wound tension.
Suturegard Body: The suture ends were repeatedly re-tightened and re-clamped to achieve the desired tissue expansion.
Hemigard Intro: Due to skin fragility and wound tension, it was decided to use HEMIGARD adhesive retention suture devices to permit a linear closure. The skin was cleaned and dried for a 6cm distance away from the wound. Excessive hair, if present, was removed to allow for adhesion.
Hemigard Postcare Instructions: The HEMIGARD strips are to remain completely dry for at least 5-7 days.
Donor Site Anesthesia Type: same as repair anesthesia
Graft Basting Suture (Optional): 5-0 Fast Absorbing Gut
Graft Donor Site Dermal Sutures (Optional): 4-0 Monocryl
Epidermal Closure Graft Donor Site (Optional): simple interrupted
Graft Donor Site Bandage (Optional-Leave Blank If You Don't Want In Note): Vaseline, telfa pad, and hypoallergenic tape were applied to the donor site.
Closure 2 Information: This tab is for additional flaps and grafts, including complex repair and grafts and complex repair and flaps. You can also specify a different location for the additional defect, if the location is the same you do not need to select a new one. We will insert the automated text for the repair you select below just as we do for solitary flaps and grafts. Please note that at this time if you select a location with a different insurance zone you will need to override the ICD10 and CPT if appropriate.
Closure 3 Information: This tab is for additional flaps and grafts above and beyond our usual structured repairs.  Please note if you enter information here it will not currently bill and you will need to add the billing information manually.
Wound Care: Petrolatum
Dressing: gelfoam and pressure dressing with Telfa
Dressing (No Sutures): dry sterile dressing
Wound Check: 28 days
Unna Boot Text: An Unna boot was placed to help immobilize the limb and facilitate more rapid healing.
Home Suture Removal Text: Patient was provided instructions on removing sutures and will remove their sutures at home.  If they have any questions or difficulties they will call the office.
Post-Care Instructions: I reviewed with the patient in detail post-care instructions. Patient is not to engage in any heavy lifting, exercise, or swimming for the next 14 days. Should the patient develop any fevers, chills, bleeding, severe pain patient will contact the office immediately.
Pain Refusal Text: I offered to prescribe pain medication but the patient refused to take this medication.
Mauc Instructions: By selecting yes to the question below the MAUC number will be added into the note.  This will be calculated automatically based on the diagnosis chosen, the size entered, the body zone selected (H,M,L) and the specific indications you chose. You will also have the option to override the Mohs AUC if you disagree with the automatically calculated number and this option is found in the Case Summary tab.
Where Do You Want The Question To Include Opioid Counseling Located?: Case Summary Tab
Eye Protection Verbiage: Before proceeding with the stage, a plastic scleral shield was inserted. The globe was anesthetized with a few drops of 1% lidocaine with 1:100,000 epinephrine. Then, an appropriate sized scleral shield was chosen and coated with lacrilube ointment. The shield was gently inserted and left in place for the duration of each stage. After the stage was completed, the shield was gently removed.
Mohs Method Verbiage: An incision at a 45 degree angle following the standard Mohs approach was done and the specimen was harvested as a microscopic controlled layer.
Surgeon/Pathologist Verbiage (Will Incorporate Name Of Surgeon From Intro If Not Blank): operated in two distinct and integrated capacities as the surgeon and pathologist.
Mohs Histo Method Verbiage: Each section was then chromacoded and processed in the Mohs lab using the Mohs protocol and submitted for frozen section.
Subsequent Stages Histo Method Verbiage: Using a similar technique to that described above, a thin layer of tissue was removed from all areas where tumor was visible on the previous stage.  The tissue was again oriented, mapped, dyed, and processed as above.
Mohs Rapid Report Verbiage: The area of clinically evident tumor was marked with skin marking ink and appropriately hatched.  The initial incision was made following the Mohs approach through the skin.  The specimen was taken to the lab, divided into the necessary number of pieces, chromacoded and processed according to the Mohs protocol.  This was repeated in successive stages until a tumor free defect was achieved.
Complex Repair Preamble Text (Leave Blank If You Do Not Want): The risks, benefits, and possible outcomes of this procedure were discussed along with the risks, benefits, and possible outcomes of other options for wound repair (including but not limited to: variations of complex closure, flaps, grafts, and second intention). The patient verbalized understanding and consent to the outlined procedure. The patient understands should the surgical site heal in a manner they find unsatisfactory further interventions or referral to an additional specialist may be required. Following sterile preparation and drape, two standing cones were removed at opposite ends of the postoperative defect within, or parallel to, the relaxed skin tension lines. The resulting defect was undermined widely and bilaterally in the appropriate surgical plane taking care to preserve local arteries, veins, nerves, and other structures of anatomic importance. Hemostasis was achieved and then the wound was sutured together in layered fashion.
Crescentic Complex Repair Preamble Text (Leave Blank If You Do Not Want): Extensive wide undermining was performed.
Intermediate Repair Preamble Text (Leave Blank If You Do Not Want): The risks, benefits, and possible outcomes of this procedure were discussed along with the risks, benefits, and possible outcomes of other options for wound repair (including but not limited to: variations of complex closure, flaps, grafts, and second intention). The patient verbalized understanding and consent to the outlined procedure. The patient understands should the surgical site heal in a manner they find unsatisfactory further interventions or referral to an additional specialist may be required. Following sterile preparation and drape, two standing cones were removed at opposite ends of the postoperative defect (as needed) within, or parallel to, the relaxed skin tension lines. The resulting defect was undermined, as necessary, in the appropriate surgical plane taking care to preserve local arteries, veins, nerves, and other structures of anatomic importance. Hemostasis was achieved and then the wound was sutured together in layered fashion.
Crescentic Intermediate Repair Preamble Text (Leave Blank If You Do Not Want): Undermining was performed with blunt dissection.
Graft Cartilage Fenestration Text: The cartilage was fenestrated with a 2mm punch biopsy to help facilitate graft survival and healing.
Non-Graft Cartilage Fenestration Text: The cartilage was fenestrated with a 2mm punch biopsy to help facilitate healing.
Secondary Intention Text (Leave Blank If You Do Not Want): Given the location of the defect, depth of the postoperative wound and inherent tension at the surgical site, healing by secondary intention was deemed most appropriate for wound closure. The risks, benefits, and possible outcomes of this procedure were discussed along with the risks, benefits, and possible outcomes of other options for wound repair (including but not limited to: complex closure, flaps, and grafts). The patient verbalized understanding and voiced a preference for secondary intention over reconstruction. The patient understands that not all healing occurs in predictable fashion or within a predictable time period. If the resulting scar is unsatisfactory, affects/distorts a free margin or other adjacent structures, or is slow to heal the patient understands further surgical intervention or even referral to an additional specialist may be required.
No Repair - Repaired With Adjacent Surgical Defect Text (Leave Blank If You Do Not Want): After obtaining clear surgical margins the defect was repaired concurrently with another surgical defect which was in close approximation.
Adjacent Tissue Transfer Text: The defect edges were debeveled with a #15 scalpel blade.  Given the location of the defect and the proximity to free margins an adjacent tissue transfer was deemed most appropriate.  Using a sterile surgical marker, an appropriate flap was drawn incorporating the defect and placing the expected incisions within the relaxed skin tension lines where possible.    The area thus outlined was incised deep to adipose tissue with a #15 scalpel blade.  The skin margins were undermined to an appropriate distance in all directions utilizing iris scissors.
Advancement Flap (Single) Text: Given the location of the defect, inherent tension at the surgical site, and the proximity to free margins an advancement flap was deemed most appropriate for wound reconstruction. The risks, benefits, and possible outcomes of this procedure were discussed along with the risks, benefits, and possible outcomes of other options for wound repair (including but not limited to: complex closure, other flaps, grafts, and second intention). The patient verbalized understanding and consent to the outlined procedure. The patient verbalized understanding that intraoperative conditions may necessitate a change in the outlined procedure resulting in modification of the original surgical plan. This decision may be made at the discretion of the surgeon, and is due to factors subject to change or that are difficult to predict preoperatively. Using a sterile surgical marker, an appropriate advancement flap was drawn incorporating the defect and placing the expected incisions within the relaxed skin tension lines where possible. The surgical site and surrounding skin were prepped and draped in sterile fashion.  Standing cones were removed from opposite ends of the defect within the appropriate surgical plane, repositioning as necessary based upon local tension, proximity to free margins/other anatomic structures, and position of the relaxed skin tension lines. The resulting defect was undermined widely and bilaterally in the appropriate surgical plane taking care to preserve local arteries, veins, nerves, and other structures of anatomic importance. This created a sliding flap capable of advancing across the defect to close the wound under minimal tension. Hemostasis was achieved and then the wound was sutured in layered fashion.
Advancement Flap (Double) Text: Given the location of the defect, inherent tension at the surgical site, and the proximity to free margins a double (bilateral) advancement flap was deemed most appropriate for wound reconstruction. The risks, benefits, and possible outcomes of this procedure were discussed along with the risks, benefits, and possible outcomes of other options for wound repair (including but not limited to: complex closure, other flaps, grafts, and second intention). The patient verbalized understanding and consent to the outlined procedure. The patient verbalized understanding that intraoperative conditions may necessitate a change in the outlined procedure resulting in modification of the original surgical plan. This decision may be made at the discretion of the surgeon, and is due to factors subject to change or that are difficult to predict preoperatively. Using a sterile surgical marker, an appropriate bilateral advancement flap was drawn incorporating the defect and placing the expected incisions within the relaxed skin tension lines where possible. The surgical site and surrounding skin were prepped and draped in sterile fashion.  Standing cones were removed from opposite ends of the defect within the appropriate surgical plane, repositioning as necessary based upon local tension, proximity to free margins/other anatomic structures, and position of the relaxed skin tension lines. The resulting defect was undermined widely and bilaterally in the appropriate surgical plane taking care to preserve local arteries, veins, nerves, and other structures of anatomic importance. This created a sliding flap capable of advancing bilaterally across the defect to close the wound under minimal tension. Hemostasis was achieved and then the wound was sutured in layered fashion.
Advancement-Rotation Flap Text: The defect edges were debeveled with a #15 scalpel blade.  Given the location of the defect, shape of the defect and the proximity to free margins an advancement-rotation flap was deemed most appropriate.  Using a sterile surgical marker, an appropriate flap was drawn incorporating the defect and placing the expected incisions within the relaxed skin tension lines where possible. The area thus outlined was incised deep to adipose tissue with a #15 scalpel blade.  The skin margins were undermined to an appropriate distance in all directions utilizing iris scissors.
Alar Island Pedicle Flap Text: The defect edges were debeveled with a #15 scalpel blade.  Given the location of the defect, shape of the defect and the proximity to the alar rim an island pedicle advancement flap was deemed most appropriate.  Using a sterile surgical marker, an appropriate advancement flap was drawn incorporating the defect, outlining the appropriate donor tissue and placing the expected incisions within the nasal ala running parallel to the alar rim. The area thus outlined was incised with a #15 scalpel blade.  The skin margins were undermined minimally to an appropriate distance in all directions around the primary defect and laterally outward around the island pedicle utilizing iris scissors.  There was minimal undermining beneath the pedicle flap.
A-T Advancement Flap Text: Given the location of the defect, inherent tension at the surgical site, and the proximity to free margins an A to T advancement flap was deemed most appropriate for wound reconstruction. The risks, benefits, and possible outcomes of this procedure were discussed along with the risks, benefits, and possible outcomes of other options for wound repair (including but not limited to: complex closure, other flaps, grafts, and second intention). The patient verbalized understanding and consent to the outlined procedure. The patient verbalized understanding that intraoperative conditions may necessitate a change in the outlined procedure resulting in modification of the original surgical plan. This decision may be made at the discretion of the surgeon, and is due to factors subject to change or that are difficult to predict preoperatively. Using a sterile surgical marker, an appropriate A to T advancement flap was drawn incorporating the defect and placing the expected incisions within the relaxed skin tension lines where possible. The surgical site and surrounding skin were prepped and draped in sterile fashion.  Standing cones were removed from opposite ends of the defect within the appropriate surgical plane, repositioning as necessary based upon local tension, proximity to free margins/other anatomic structures, and position of the relaxed skin tension lines. The shape of one standing cone was modified, taking two smaller standing cones (each approximately half the size of the traditional standing cone) and repositioning them along opposite ends of a lateral plane (with respect to the defect).  The resulting defect was undermined widely and bilaterally in the appropriate surgical plane taking care to preserve local arteries, veins, nerves, and other structures of anatomic importance. This created a sliding flap capable of advancing across the defect to close the wound under minimal tension. Hemostasis was achieved and then the wound was sutured in layered fashion.
Banner Transposition Flap Text: The defect edges were debeveled with a #15 scalpel blade.  Given the location of the defect and the proximity to free margins a Banner transposition flap was deemed most appropriate.  Using a sterile surgical marker, an appropriate flap drawn around the defect. The area thus outlined was incised deep to adipose tissue with a #15 scalpel blade.  The skin margins were undermined to an appropriate distance in all directions utilizing iris scissors.
Bilateral Helical Rim Advancement Flap Text: The defect edges were debeveled with a #15 blade scalpel.  Given the location of the defect and the proximity to free margins (helical rim) a bilateral helical rim advancement flap was deemed most appropriate.  Using a sterile surgical marker, the appropriate advancement flaps were drawn incorporating the defect and placing the expected incisions between the helical rim and antihelix where possible.  The area thus outlined was incised through and through with a #15 scalpel blade.  With a skin hook and iris scissors, the flaps were gently and sharply undermined and freed up.
Bilateral Rotation Flap Text: The defect edges were debeveled with a #15 scalpel blade. Given the location of the defect, shape of the defect and the proximity to free margins a bilateral rotation flap was deemed most appropriate. Using a sterile surgical marker, an appropriate rotation flap was drawn incorporating the defect and placing the expected incisions within the relaxed skin tension lines where possible. The area thus outlined was incised deep to adipose tissue with a #15 scalpel blade. The skin margins were undermined to an appropriate distance in all directions utilizing iris scissors. Following this, the designed flap was carried over into the primary defect and sutured into place.
Bilobed Flap Text: Given the location of the defect, inherent tension at the surgical site, and the proximity to free margins a bilobe transposition flap was deemed most appropriate for wound reconstruction. The risks, benefits, and possible outcomes of this procedure were discussed along with the risks, benefits, and possible outcomes of other options for wound repair (including but not limited to: complex closure, other flaps, grafts, and second intention). The patient verbalized understanding and consent to the outlined procedure. The patient verbalized understanding that intraoperative conditions may necessitate a change in the outlined procedure resulting in modification of the original surgical plan. This decision may be made at the discretion of the surgeon, and is due to factors subject to change or that are difficult to predict preoperatively. We also discussed that should the patient heal with a scar they find noticeable/unfavorable additional surgical procedures and/or referral to additional specialists may be required. The patient understands the healing process takes place over many months and that the \"final\" appearance of the scar line often takes a year or longer to achieve.\\n\\nUsing a sterile surgical marker, an appropriate bilobed transposition flap was drawn incorporating the defect and placing the expected incisions within the relaxed skin tension lines where possible. The surgical site and surrounding skin were prepped and draped in sterile fashion.  A single standing cone was removed at the expected pivot point of the transposition flap in the appropriate surgical plane, repositioning as necessary based upon local tension, proximity to free margins/other anatomic structures, and position of the relaxed skin tension lines. The flap (containing two lobes  by 45 degrees, each proportionate in size to the postop defect) was incised and raised in the appropriate surgical plane. The resulting defect was undermined widely and bilaterally in the appropriate surgical plane taking care to preserve local arteries, veins, nerves, and other structures of anatomic importance. This created a flap capable of transposing across the defect to close the wound under minimal tension, without distortion of adjacent free margins. Hemostasis was achieved and then the wound was sutured in layered fashion.
Bi-Rhombic Flap Text: The defect edges were debeveled with a #15 scalpel blade.  Given the location of the defect and the proximity to free margins a bi-rhombic flap was deemed most appropriate.  Using a sterile surgical marker, an appropriate rhombic flap was drawn incorporating the defect. The area thus outlined was incised deep to adipose tissue with a #15 scalpel blade.  The skin margins were undermined to an appropriate distance in all directions utilizing iris scissors.
Burow's Advancement Flap Text: The defect edges were debeveled with a #15 scalpel blade.  Given the location of the defect and the proximity to free margins a Burow's advancement flap was deemed most appropriate.  Using a sterile surgical marker, the appropriate advancement flap was drawn incorporating the defect and placing the expected incisions within the relaxed skin tension lines where possible.    The area thus outlined was incised deep to adipose tissue with a #15 scalpel blade.  The skin margins were undermined to an appropriate distance in all directions utilizing iris scissors.
Chonodrocutaneous Helical Advancement Flap Text: Given the location of the defect, inherent tension at the surgical site, and the proximity to free margins a Burow’s advancement flap was deemed most appropriate for wound reconstruction. The risks, benefits, and possible outcomes of this procedure were discussed along with the risks, benefits, and possible outcomes of other options for wound repair (including but not limited to: complex closure, other flaps, grafts, and second intention). The patient verbalized understanding and consent to the outlined procedure. The patient verbalized understanding that intraoperative conditions may necessitate a change in the outlined procedure resulting in modification of the original surgical plan. This decision may be made at the discretion of the surgeon, and is due to factors subject to change or that are difficult to predict preoperatively. Using a sterile surgical marker, an appropriate Burow’s advancement flap was drawn incorporating the defect and placing the expected incisions within the relaxed skin tension lines where possible. The surgical site and surrounding skin were prepped and draped in sterile fashion.  Standing cones were removed from opposite ends of the defect within the appropriate surgical plane, repositioning as necessary based upon local tension, proximity to free margins/other anatomic structures, and position of the relaxed skin tension lines. The position of one dog ear was modified, moving the standing cone along a lateral plane to lie in a more favorable location for closure.  The resulting defect was undermined widely and bilaterally in the appropriate surgical plane taking care to preserve local arteries, veins, nerves, and other structures of anatomic importance. This created a sliding flap capable of advancing across the defect to close the wound under minimal tension. Hemostasis was achieved and then the wound was sutured in layered fashion.
Crescentic Advancement Flap Text: Given the location of the defect, inherent tension at the surgical site, and the proximity to free margins a Crescentic advancement flap was deemed most appropriate for wound reconstruction. The risks, benefits, and possible outcomes of this procedure were discussed along with the risks, benefits, and possible outcomes of other options for wound repair (including but not limited to: complex closure, other flaps, grafts, and second intention). The patient verbalized understanding and consent to the outlined procedure. The patient verbalized understanding that intraoperative conditions may necessitate a change in the outlined procedure resulting in modification of the original surgical plan. This decision may be made at the discretion of the surgeon, and is due to factors subject to change or that are difficult to predict preoperatively. Using a sterile surgical marker, an appropriate Crescentic advancement flap was drawn incorporating the defect and placing the expected incisions within the relaxed skin tension lines where possible. The surgical site and surrounding skin were prepped and draped in sterile fashion.  Standing cones were removed from opposite ends of the defect within the appropriate surgical plane, repositioning as necessary based upon local tension, proximity to free margins/other anatomic structures, and position of the relaxed skin tension lines. The shape of one standing cone was modified, designed in a crescentic shape to allow for sliding movement in both the horizontal and vertical plane.  The resulting defect was undermined widely and bilaterally in the appropriate surgical plane taking care to preserve local arteries, veins, nerves, and other structures of anatomic importance. This created a sliding flap capable of advancing across the defect to close the wound under minimal tension. Hemostasis was achieved and then the wound was sutured in layered fashion.
Dorsal Nasal Flap Text: Given the location of the defect, inherent tension at the surgical site, and the proximity to free margins a dorsal nasal flap was deemed most appropriate for wound reconstruction. The risks, benefits, and possible outcomes of this procedure were discussed along with the risks, benefits, and possible outcomes of other options for wound repair (including but not limited to: complex closure, other flaps, grafts, and second intention). The patient verbalized understanding and consent to the outlined procedure. The patient verbalized understanding that intraoperative conditions may necessitate a change in the outlined procedure resulting in modification of the original surgical plan. This decision may be made at the discretion of the surgeon, and is due to factors subject to change or that are difficult to predict preoperatively. We also discussed that should the patient heal with a scar they find noticeable/unfavorable additional surgical procedures and/or referral to additional specialists may be required. The patient understands the healing process takes place over many months and that the \"final\" appearance of the scar line often takes a year or longer to achieve.\\nUsing a sterile surgical marker, an appropriate dorsal nasal flap was drawn incorporating the defect and placing the expected incisions within the relaxed skin tension lines where possible. The surgical site and surrounding skin were prepped and draped in sterile fashion.  The area thus outlined was incised in the appropriate surgical plane with a #15 scalpel blade. The resulting defect was undermined widely and bilaterally in the appropriate surgical plane taking care to preserve local arteries, veins, nerves, and other structures of anatomic importance. This created a flap capable of rotating across the defect to close the wound under minimal tension. Hemostasis was obtained and then the flap was sutured together in layered fashion.
Double Island Pedicle Flap Text: The defect edges were debeveled with a #15 scalpel blade.  Given the location of the defect, shape of the defect and the proximity to free margins a double island pedicle advancement flap was deemed most appropriate.  Using a sterile surgical marker, an appropriate advancement flap was drawn incorporating the defect, outlining the appropriate donor tissue and placing the expected incisions within the relaxed skin tension lines where possible.    The area thus outlined was incised deep to adipose tissue with a #15 scalpel blade.  The skin margins were undermined to an appropriate distance in all directions around the primary defect and laterally outward around the island pedicle utilizing iris scissors.  There was minimal undermining beneath the pedicle flap.
Double O-Z Flap Text: The defect edges were debeveled with a #15 scalpel blade.  Given the location of the defect, shape of the defect and the proximity to free margins a Double O-Z flap was deemed most appropriate.  Using a sterile surgical marker, an appropriate transposition flap was drawn incorporating the defect and placing the expected incisions within the relaxed skin tension lines where possible. The area thus outlined was incised deep to adipose tissue with a #15 scalpel blade.  The skin margins were undermined to an appropriate distance in all directions utilizing iris scissors.
Double O-Z Plasty Text: The defect edges were debeveled with a #15 scalpel blade.  Given the location of the defect, shape of the defect and the proximity to free margins a Double O-Z plasty (double transposition flap) was deemed most appropriate.  Using a sterile surgical marker, the appropriate transposition flaps were drawn incorporating the defect and placing the expected incisions within the relaxed skin tension lines where possible. The area thus outlined was incised deep to adipose tissue with a #15 scalpel blade.  The skin margins were undermined to an appropriate distance in all directions utilizing iris scissors.  Hemostasis was achieved with electrocautery.  The flaps were then transposed into place, one clockwise and the other counterclockwise, and anchored with interrupted buried subcutaneous sutures.
Double Z Plasty Text: The lesion was extirpated to the level of the fat with a #15 scalpel blade. Given the location of the defect, shape of the defect and the proximity to free margins a double Z-plasty was deemed most appropriate for repair. Using a sterile surgical marker, the appropriate transposition arms of the double Z-plasty were drawn incorporating the defect and placing the expected incisions within the relaxed skin tension lines where possible. The area thus outlined was incised deep to adipose tissue with a #15 scalpel blade. The skin margins were undermined to an appropriate distance in all directions utilizing iris scissors. The opposing transposition arms were then transposed and carried over into place in opposite direction and anchored with interrupted buried subcutaneous sutures.
Ear Star Wedge Flap Text: The defect edges were debeveled with a #15 blade scalpel.  Given the location of the defect and the proximity to free margins (helical rim) an ear star wedge flap was deemed most appropriate.  Using a sterile surgical marker, the appropriate flap was drawn incorporating the defect and placing the expected incisions between the helical rim and antihelix where possible.  The area thus outlined was incised through and through with a #15 scalpel blade.
Flip-Flop Flap Text: The defect edges were debeveled with a #15 blade scalpel.  Given the location of the defect and the proximity to free margins a flip-flop flap was deemed most appropriate. Using a sterile surgical marker, the appropriate flap was drawn incorporating the defect and placing the expected incisions between the helical rim and antihelix where possible.  The area thus outlined was incised through and through with a #15 scalpel blade. Following this, the designed flap was carried over into the primary defect and sutured into place.
Hatchet Flap Text: The defect edges were debeveled with a #15 scalpel blade.  Given the location of the defect, shape of the defect and the proximity to free margins a hatchet flap was deemed most appropriate.  Using a sterile surgical marker, an appropriate hatchet flap was drawn incorporating the defect and placing the expected incisions within the relaxed skin tension lines where possible.    The area thus outlined was incised deep to adipose tissue with a #15 scalpel blade.  The skin margins were undermined to an appropriate distance in all directions utilizing iris scissors.
Helical Rim Advancement Flap Text: Given the location of the defect, inherent tension at the surgical site, and the proximity to free margins a helical rim advancement flap was deemed most appropriate for wound reconstruction. The risks, benefits, and possible outcomes of this procedure were discussed along with the risks, benefits, and possible outcomes of other options for wound repair (including but not limited to: complex closure, other flaps, grafts, and second intention). The patient verbalized understanding and consent to the outlined procedure. The patient verbalized understanding that intraoperative conditions may necessitate a change in the outlined procedure resulting in modification of the original surgical plan. This decision may be made at the discretion of the surgeon, and is due to factors subject to change or that are difficult to predict preoperatively. Using a sterile surgical marker, an appropriate helical rim advancement flap was drawn incorporating the defect and placing the expected incisions within the relaxed skin tension lines where possible. The surgical site and surrounding skin were prepped and draped in sterile fashion. Anesthesia was checked and supplemented as necessary. A single standing cone was then excised posterior to the defect. A linear arc-like incision was removed at the anterior aspect of the defect and carried to a point of relative auricular skin laxity. A second dog ear was then removed at the end of this incision. The flap was undermined widely and bilaterally in the appropriate surgical plane. Hemostasis was obtained and then the flap was sutured together in layered fashion.
H Plasty Text: Given the location of the defect, shape of the defect and the proximity to free margins a H-plasty was deemed most appropriate for repair.  Using a sterile surgical marker, the appropriate advancement arms of the H-plasty were drawn incorporating the defect and placing the expected incisions within the relaxed skin tension lines where possible. The area thus outlined was incised deep to adipose tissue with a #15 scalpel blade. The skin margins were undermined to an appropriate distance in all directions utilizing iris scissors.  The opposing advancement arms were then advanced into place in opposite direction and anchored with interrupted buried subcutaneous sutures.
Island Pedicle Flap Text: Given the location of the defect, inherent tension at the surgical site, and the proximity to free margins an island pedicle advancement flap was deemed most appropriate for wound reconstruction. The risks, benefits, and possible outcomes of this procedure were discussed along with the risks, benefits, and possible outcomes of other options for wound repair (including but not limited to: complex closure, other flaps, grafts, and second intention). The patient verbalized understanding and consent to the outlined procedure. The patient verbalized understanding that intraoperative conditions may necessitate a change in the outlined procedure resulting in modification of the original surgical plan. This decision may be made at the discretion of the surgeon, and is due to factors subject to change or that are difficult to predict preoperatively. \\n\\nUsing a sterile surgical marker, an appropriate island pedicle advancement flap was drawn incorporating the defect and placing the expected incisions within the relaxed skin tension lines where possible. The surgical site and surrounding skin were prepped and draped in sterile fashion.  The island pedicle advancement flap was incised and undermined within the appropriate surgical plane, creating a mobile flap supported by a central vascular pedicle.  The wound edges surrounding the flap and primary defect were undermined widely and bilaterally in the appropriate surgical plane taking care to preserve local arteries, veins, nerves, and other structures of anatomic importance. This created a sliding flap capable of advancing across the defect to close the wound under minimal tension. Hemostasis was achieved and then the wound was sutured in layered fashion.
Island Pedicle Flap With Canthal Suspension Text: The defect edges were debeveled with a #15 scalpel blade.  Given the location of the defect, shape of the defect and the proximity to free margins an island pedicle advancement flap was deemed most appropriate.  Using a sterile surgical marker, an appropriate advancement flap was drawn incorporating the defect, outlining the appropriate donor tissue and placing the expected incisions within the relaxed skin tension lines where possible. The area thus outlined was incised deep to adipose tissue with a #15 scalpel blade.  The skin margins were undermined to an appropriate distance in all directions around the primary defect and laterally outward around the island pedicle utilizing iris scissors.  There was minimal undermining beneath the pedicle flap. A suspension suture was placed in the canthal tendon to prevent tension and prevent ectropion.
Island Pedicle Flap-Requiring Vessel Identification Text: The defect edges were debeveled with a #15 scalpel blade.  Given the location of the defect, shape of the defect and the proximity to free margins an island pedicle advancement flap was deemed most appropriate.  Using a sterile surgical marker, an appropriate advancement flap was drawn, based on the axial vessel mentioned above, incorporating the defect, outlining the appropriate donor tissue and placing the expected incisions within the relaxed skin tension lines where possible.    The area thus outlined was incised deep to adipose tissue with a #15 scalpel blade.  The skin margins were undermined to an appropriate distance in all directions around the primary defect and laterally outward around the island pedicle utilizing iris scissors.  There was minimal undermining beneath the pedicle flap.
Keystone Flap Text: The defect edges were debeveled with a #15 scalpel blade.  Given the location of the defect, shape of the defect a keystone flap was deemed most appropriate.  Using a sterile surgical marker, an appropriate keystone flap was drawn incorporating the defect, outlining the appropriate donor tissue and placing the expected incisions within the relaxed skin tension lines where possible. The area thus outlined was incised deep to adipose tissue with a #15 scalpel blade.  The skin margins were undermined to an appropriate distance in all directions around the primary defect and laterally outward around the flap utilizing iris scissors.
Melolabial Transposition Flap Text: Given the size/depth/location of the defect, inherent tension at the surgical site, and the proximity to free margins, a one stage nasolabial transposition flap was deemed most appropriate for wound reconstruction. The risks, benefits, and possible outcomes of this procedure were discussed along with the risks, benefits, and possible outcomes of other options for wound repair (including but not limited to: complex closure, other flaps, grafts, second intention, and combinations of these procedures). The patient verbalized understanding and consent to the outlined procedure. The patient verbalized understanding that intraoperative conditions may necessitate a change in the outlined procedure resulting in modification of the original surgical plan. This decision may be made at the discretion of the surgeon, and is due to factors subject to change or that are difficult to predict preoperatively. We also discussed complex defects involving multiple cosmetic subunits often necessitate several smaller planned surgical revisions spread out over the course of many months in order to restore appropriate tissue volume, cosmetic landmarks, and diminish scarring. In many cases anatomic “semblance” is possible, but complete parity is not. The patient understands that in some cases referral to additional specialists for additional procedures may be required. \\nUsing a sterile surgical marker, an appropriate nasolabial transposition flap was drawn incorporating the defect and placing the expected incisions within the relaxed skin tension lines where possible. The surgical site and surrounding skin were prepped and draped in sterile fashion. Anesthesia was checked and supplemented as necessary.  A single standing cone was removed along one border of the surgical defect. The flap and second standing cone were incised and raised from the opposite pole as the standing cone in the appropriate surgical plane. The entire flap and area surrounding the removed standing cones was undermined widely and bilaterally in the appropriate surgical plane taking care to preserve local arteries, veins, nerves, and other structures of anatomic importance. The area along the distal nasal portion of the post Mohs defect was undermined just enough to place deep buried vertical mattress sutures. This created a flap capable of transposing into the defect to drape the wound under minimal tension. Hemostasis was achieved. A single anchoring buried vertical mattress suture was placed to advance/transpose the flap into position without distortion of the surrounding free margins. The remaining flap was sutured in layered fashion.
Mercedes Flap Text: Given the location of the defect, inherent tension at the surgical site, and the proximity to free margins a Mercedes (Tripolar) Advancement Flap was deemed most appropriate for wound reconstruction. The risks, benefits, and possible outcomes of this procedure were discussed along with the risks, benefits, and possible outcomes of other options for wound repair (including but not limited to: complex closure, other flaps, grafts, and second intention). The patient verbalized understanding and consent to the outlined procedure. The patient verbalized understanding that intraoperative conditions may necessitate a change in the outlined procedure resulting in modification of the original surgical plan. This decision may be made at the discretion of the surgeon, and is due to factors subject to change or that are difficult to predict preoperatively. Using a sterile surgical marker, an appropriate tripolar advancement flap was drawn incorporating the defect and placing the expected incisions within the relaxed skin tension lines where possible. The surgical site and surrounding skin were prepped and draped in sterile fashion.  Anesthesia was checked and supplemented as necessary. Standing cones were removed from three “poles” of the defect within the appropriate surgical plane, repositioning as necessary based upon local tension, proximity to free margins/other anatomic structures, and position of the relaxed skin tension lines. The resulting defect was undermined widely and bilaterally in the appropriate surgical plane taking care to preserve local arteries, veins, nerves, and other structures of anatomic importance. This created a sliding flap capable of advancing across the defect from three reservoirs to close the wound under minimal tension. Hemostasis was achieved and then the wound was sutured in layered fashion.
Modified Advancement Flap Text: Given the location of the defect, inherent tension at the surgical site, and the proximity to free margins a modified advancement flap was deemed most appropriate for wound reconstruction. The risks, benefits, and possible outcomes of this procedure were discussed along with the risks, benefits, and possible outcomes of other options for wound repair (including but not limited to: complex closure, other flaps, grafts, and second intention). The patient verbalized understanding and consent to the outlined procedure. The patient verbalized understanding that intraoperative conditions may necessitate a change in the outlined procedure resulting in modification of the original surgical plan. This decision may be made at the discretion of the surgeon, and is due to factors subject to change or that are difficult to predict preoperatively. Using a sterile surgical marker, an appropriate advancement flap was drawn incorporating the defect and placing the expected incisions within the relaxed skin tension lines where possible.\\n\\nThe surgical site and surrounding skin were prepped and draped in sterile fashion.  Standing cones were removed from opposite ends of the defect within the appropriate surgical plane, repositioning as necessary based upon local tension, proximity to free margins/other anatomic structures, and position of the relaxed skin tension lines. The resulting defect was undermined widely and bilaterally in the appropriate surgical plane taking care to preserve local arteries, veins, nerves, and other structures of anatomic importance. This created a sliding flap capable of advancing across the defect to close the wound under minimal tension. Hemostasis was achieved and then the wound was sutured in layered fashion.
Mucosal Advancement Flap Text: Given the location of the defect, shape of the defect and the proximity to free margins a mucosal advancement flap was deemed most appropriate. Incisions were made with a 15 blade scalpel in the appropriate fashion along the cutaneous vermilion border and the mucosal lip. The remaining actinically damaged mucosal tissue was excised.  The mucosal advancement flap was then elevated to the gingival sulcus with care taken to preserve the neurovascular structures and advanced into the primary defect. Care was taken to ensure that precise realignment of the vermilion border was achieved.
Muscle Hinge Flap Text: The defect edges were debeveled with a #15 scalpel blade.  Given the size, depth and location of the defect and the proximity to free margins a muscle hinge flap was deemed most appropriate.  Using a sterile surgical marker, an appropriate hinge flap was drawn incorporating the defect. The area thus outlined was incised with a #15 scalpel blade.  The skin margins were undermined to an appropriate distance in all directions utilizing iris scissors.
Mustarde Flap Text: The defect edges were debeveled with a #15 scalpel blade.  Given the size, depth and location of the defect and the proximity to free margins a Mustarde flap was deemed most appropriate.  Using a sterile surgical marker, an appropriate flap was drawn incorporating the defect. The area thus outlined was incised with a #15 scalpel blade.  The skin margins were undermined to an appropriate distance in all directions utilizing iris scissors.
Nasal Turnover Hinge Flap Text: The defect edges were debeveled with a #15 scalpel blade.  Given the size, depth, location of the defect and the defect being full thickness a nasal turnover hinge flap was deemed most appropriate.  Using a sterile surgical marker, an appropriate hinge flap was drawn incorporating the defect. The area thus outlined was incised with a #15 scalpel blade. The flap was designed to recreate the nasal mucosal lining and the alar rim. The skin margins were undermined to an appropriate distance in all directions utilizing iris scissors.
Nasalis-Muscle-Based Myocutaneous Island Pedicle Flap Text: Using a #15 blade, an incision was made around the donor flap to the level of the nasalis muscle. Wide lateral undermining was then performed in both the subcutaneous plane above the nasalis muscle, and in a submuscular plane just above periosteum. This allowed the formation of a free nasalis muscle axial pedicle (based on the angular artery) which was still attached to the actual cutaneous flap, increasing its mobility and vascular viability. Hemostasis was obtained with pinpoint electrocoagulation. The flap was mobilized into position and the pivotal anchor points positioned and stabilized with buried interrupted sutures. Subcutaneous and dermal tissues were closed in a multilayered fashion with sutures. Tissue redundancies were excised, and the epidermal edges were apposed without significant tension and sutured with sutures.
Nasalis Myocutaneous Flap Text: Using a #15 blade, an incision was made around the donor flap to the level of the nasalis muscle. Wide lateral undermining was then performed in both the subcutaneous plane above the nasalis muscle, and in a submuscular plane just above periosteum. This allowed the formation of a free nasalis muscle axial pedicle which was still attached to the actual cutaneous flap, increasing its mobility and vascular viability. Hemostasis was obtained with pinpoint electrocoagulation. The flap was mobilized into position and the pivotal anchor points positioned and stabilized with buried interrupted sutures. Subcutaneous and dermal tissues were closed in a multilayered fashion with sutures. Tissue redundancies were excised, and the epidermal edges were apposed without significant tension and sutured with sutures.
Nasolabial Transposition Flap Text: The defect edges were debeveled with a #15 scalpel blade.  Given the size, depth and location of the defect and the proximity to free margins a nasolabial transposition flap was deemed most appropriate. Using a sterile surgical marker, an appropriate flap was drawn incorporating the defect. The area thus outlined was incised with a #15 scalpel blade. The skin margins were undermined to an appropriate distance in all directions utilizing iris scissors. Following this, the designed flap was carried into the primary defect and sutured into place.
Orbicularis Oris Muscle Flap Text: The defect edges were debeveled with a #15 scalpel blade.  Given that the defect affected the competency of the oral sphincter an orbicularis oris muscle flap was deemed most appropriate to restore this competency and normal muscle function.  Using a sterile surgical marker, an appropriate flap was drawn incorporating the defect. The area thus outlined was incised with a #15 scalpel blade.
O-T Advancement Flap Text: The defect edges were debeveled with a #15 scalpel blade.  Given the location of the defect, shape of the defect and the proximity to free margins an O-T advancement flap was deemed most appropriate.  Using a sterile surgical marker, an appropriate advancement flap was drawn incorporating the defect and placing the expected incisions within the relaxed skin tension lines where possible.    The area thus outlined was incised deep to adipose tissue with a #15 scalpel blade.  The skin margins were undermined to an appropriate distance in all directions utilizing iris scissors.
O-T Plasty Text: The defect edges were debeveled with a #15 scalpel blade.  Given the location of the defect, shape of the defect and the proximity to free margins an O-T plasty was deemed most appropriate.  Using a sterile surgical marker, an appropriate O-T plasty was drawn incorporating the defect and placing the expected incisions within the relaxed skin tension lines where possible.    The area thus outlined was incised deep to adipose tissue with a #15 scalpel blade.  The skin margins were undermined to an appropriate distance in all directions utilizing iris scissors.
O-L Flap Text: The defect edges were debeveled with a #15 scalpel blade.  Given the location of the defect, shape of the defect and the proximity to free margins an O-L flap was deemed most appropriate.  Using a sterile surgical marker, an appropriate advancement flap was drawn incorporating the defect and placing the expected incisions within the relaxed skin tension lines where possible.    The area thus outlined was incised deep to adipose tissue with a #15 scalpel blade.  The skin margins were undermined to an appropriate distance in all directions utilizing iris scissors.
O-Z Flap Text: The defect edges were debeveled with a #15 scalpel blade.  Given the location of the defect, shape of the defect and the proximity to free margins an O-Z flap was deemed most appropriate.  Using a sterile surgical marker, an appropriate transposition flap was drawn incorporating the defect and placing the expected incisions within the relaxed skin tension lines where possible. The area thus outlined was incised deep to adipose tissue with a #15 scalpel blade.  The skin margins were undermined to an appropriate distance in all directions utilizing iris scissors.
O-Z Plasty Text: The defect edges were debeveled with a #15 scalpel blade.  Given the location of the defect, shape of the defect and the proximity to free margins an O-Z plasty (double transposition flap) was deemed most appropriate.  Using a sterile surgical marker, the appropriate transposition flaps were drawn incorporating the defect and placing the expected incisions within the relaxed skin tension lines where possible.    The area thus outlined was incised deep to adipose tissue with a #15 scalpel blade.  The skin margins were undermined to an appropriate distance in all directions utilizing iris scissors.  Hemostasis was achieved with electrocautery.  The flaps were then transposed into place, one clockwise and the other counterclockwise, and anchored with interrupted buried subcutaneous sutures.
Peng Advancement Flap Text: The defect edges were debeveled with a #15 scalpel blade.  Given the location of the defect, shape of the defect and the proximity to free margins a Peng advancement flap was deemed most appropriate.  Using a sterile surgical marker, an appropriate advancement flap was drawn incorporating the defect and placing the expected incisions within the relaxed skin tension lines where possible. The area thus outlined was incised deep to adipose tissue with a #15 scalpel blade.  The skin margins were undermined to an appropriate distance in all directions utilizing iris scissors.
Rectangular Flap Text: The defect edges were debeveled with a #15 scalpel blade. Given the location of the defect and the proximity to free margins a rectangular flap was deemed most appropriate. Using a sterile surgical marker, an appropriate rectangular flap was drawn incorporating the defect. The area thus outlined was incised deep to adipose tissue with a #15 scalpel blade. The skin margins were undermined to an appropriate distance in all directions utilizing iris scissors. Following this, the designed flap was carried over into the primary defect and sutured into place.
Rhombic Flap Text: Given the location of the defect, inherent tension at the surgical site, and the proximity to free margins a rhombic transposition flap was deemed most appropriate for wound reconstruction. The risks, benefits, and possible outcomes of this procedure were discussed along with the risks, benefits, and possible outcomes of other options for wound repair (including but not limited to: complex closure, other flaps, grafts, and second intention). The patient verbalized understanding and consent to the outlined procedure. The patient verbalized understanding that intraoperative conditions may necessitate a change in the outlined procedure resulting in modification of the original surgical plan. This decision may be made at the discretion of the surgeon, and is due to factors subject to change or that are difficult to predict preoperatively. Using a sterile surgical marker, an appropriate rhombic transposition flap was drawn incorporating the defect and placing the expected incisions within the relaxed skin tension lines where possible. The surgical site and surrounding skin were prepped and draped in sterile fashion.  A single standing cone was removed at the expected pivot point of the transposition flap in the appropriate surgical plane, repositioning as necessary based upon local tension, proximity to free margins/other anatomic structures, and position of the relaxed skin tension lines. The flap (designed approximately 90 degrees from one surgical axis) was incised and raised in the appropriate surgical plane. The resulting defect was undermined widely and bilaterally in the appropriate surgical plane taking care to preserve local arteries, veins, nerves, and other structures of anatomic importance. This created a flap capable of transposing across the defect to close the wound under minimal tension, without distortion of adjacent free margins. Hemostasis was achieved and then the wound was sutured in layered fashion.
Rotation Flap Text: Given the location of the defect, inherent tension at the surgical site, and the proximity to free margins a rotation flap was deemed most appropriate for wound reconstruction. The risks, benefits, and possible outcomes of this procedure were discussed along with the risks, benefits, and possible outcomes of other options for wound repair (including but not limited to: complex closure, other flaps, grafts, and second intention). The patient verbalized understanding and consent to the outlined procedure. The patient verbalized understanding that intraoperative conditions may necessitate a change in the outlined procedure resulting in modification of the original surgical plan. This decision may be made at the discretion of the surgeon, and is due to factors subject to change or that are difficult to predict preoperatively. Using a sterile surgical marker, an appropriate rotation flap was drawn incorporating the defect and placing the expected incisions within the relaxed skin tension lines where possible. The area thus outlined was incised deep to adipose tissue with a #15 scalpel blade. The resulting defect was undermined widely and bilaterally in the appropriate surgical plane taking care to preserve local arteries, veins, nerves, and other structures of anatomic importance. This created a flap capable of rotating across the defect to close the wound under minimal tension. Hemostasis was obtained and then the flap was sutured together in layered fashion.
Spiral Flap Text: The defect edges were debeveled with a #15 scalpel blade.  Given the location of the defect, shape of the defect and the proximity to free margins a spiral flap was deemed most appropriate.  Using a sterile surgical marker, an appropriate rotation flap was drawn incorporating the defect and placing the expected incisions within the relaxed skin tension lines where possible. The area thus outlined was incised deep to adipose tissue with a #15 scalpel blade.  The skin margins were undermined to an appropriate distance in all directions utilizing iris scissors.
Staged Advancement Flap Text: The defect edges were debeveled with a #15 scalpel blade.  Given the location of the defect, shape of the defect and the proximity to free margins a staged advancement flap was deemed most appropriate.  Using a sterile surgical marker, an appropriate advancement flap was drawn incorporating the defect and placing the expected incisions within the relaxed skin tension lines where possible. The area thus outlined was incised deep to adipose tissue with a #15 scalpel blade.  The skin margins were undermined to an appropriate distance in all directions utilizing iris scissors.
Star Wedge Flap Text: The defect edges were debeveled with a #15 scalpel blade.  Given the location of the defect, shape of the defect and the proximity to free margins a star wedge flap was deemed most appropriate.  Using a sterile surgical marker, an appropriate rotation flap was drawn incorporating the defect and placing the expected incisions within the relaxed skin tension lines where possible. The area thus outlined was incised deep to adipose tissue with a #15 scalpel blade.  The skin margins were undermined to an appropriate distance in all directions utilizing iris scissors.
Transposition Flap Text: The defect edges were debeveled with a #15 scalpel blade.  Given the location of the defect and the proximity to free margins a transposition flap was deemed most appropriate.  Using a sterile surgical marker, an appropriate transposition flap was drawn incorporating the defect.    The area thus outlined was incised deep to adipose tissue with a #15 scalpel blade.  The skin margins were undermined to an appropriate distance in all directions utilizing iris scissors.
Trilobed Flap Text: Given the location of the defect, inherent tension at the surgical site, and the proximity to free margins a trilobed transposition flap was deemed most appropriate for wound reconstruction. The risks, benefits, and possible outcomes of this procedure were discussed along with the risks, benefits, and possible outcomes of other options for wound repair (including but not limited to: complex closure, other flaps, grafts, and second intention). The patient verbalized understanding and consent to the outlined procedure. The patient verbalized understanding that intraoperative conditions may necessitate a change in the outlined procedure resulting in modification of the original surgical plan. This decision may be made at the discretion of the surgeon, and is due to factors subject to change or that are difficult to predict preoperatively. We also discussed that should the patient heal with a scar they find noticeable/unfavorable additional surgical procedures and/or referral to additional specialists may be required. The patient understands the healing process takes place over many months and that the \"final\" appearance of the scar line often takes a year or longer to achieve.\\nUsing a sterile surgical marker, an appropriate trilobe transposition flap was drawn incorporating the defect and placing the expected incisions within the relaxed skin tension lines where possible. The surgical site and surrounding skin were prepped and draped in sterile fashion.  A single standing cone was removed at the expected pivot point of the transposition flap in the appropriate surgical plane, repositioning as necessary based upon local tension, proximity to free margins/other anatomic structures, and position of the relaxed skin tension lines. The flap (containing three lobes  by 45 degrees, each proportionate in size to the postop defect) was incised and raised in the appropriate surgical plane. The resulting defect was undermined widely and bilaterally in the appropriate surgical plane taking care to preserve local arteries, veins, nerves, and other structures of anatomic importance. This created a flap capable of transposing across the defect to close the wound under minimal tension, without distortion of adjacent free margins. Hemostasis was achieved and then the wound was sutured in layered fashion.
V-Y Plasty Text: The defect edges were debeveled with a #15 scalpel blade.  Given the location of the defect, shape of the defect and the proximity to free margins an V-Y advancement flap was deemed most appropriate.  Using a sterile surgical marker, an appropriate advancement flap was drawn incorporating the defect and placing the expected incisions within the relaxed skin tension lines where possible.    The area thus outlined was incised deep to adipose tissue with a #15 scalpel blade.  The skin margins were undermined to an appropriate distance in all directions utilizing iris scissors.
W Plasty Text: The lesion was extirpated to the level of the fat with a #15 scalpel blade.  Given the location of the defect, shape of the defect and the proximity to free margins a W-plasty was deemed most appropriate for repair.  Using a sterile surgical marker, the appropriate transposition arms of the W-plasty were drawn incorporating the defect and placing the expected incisions within the relaxed skin tension lines where possible.    The area thus outlined was incised deep to adipose tissue with a #15 scalpel blade.  The skin margins were undermined to an appropriate distance in all directions utilizing iris scissors.  The opposing transposition arms were then transposed into place in opposite direction and anchored with interrupted buried subcutaneous sutures.
Z Plasty Text: The lesion was extirpated to the level of the fat with a #15 scalpel blade.  Given the location of the defect, shape of the defect and the proximity to free margins a Z-plasty was deemed most appropriate for repair.  Using a sterile surgical marker, the appropriate transposition arms of the Z-plasty were drawn incorporating the defect and placing the expected incisions within the relaxed skin tension lines where possible.    The area thus outlined was incised deep to adipose tissue with a #15 scalpel blade.  The skin margins were undermined to an appropriate distance in all directions utilizing iris scissors.  The opposing transposition arms were then transposed into place in opposite direction and anchored with interrupted buried subcutaneous sutures.
Zygomaticofacial Flap Text: Given the location of the defect, shape of the defect and the proximity to free margins a zygomaticofacial flap was deemed most appropriate for repair.  Using a sterile surgical marker, the appropriate flap was drawn incorporating the defect and placing the expected incisions within the relaxed skin tension lines where possible. The area thus outlined was incised deep to adipose tissue with a #15 scalpel blade with preservation of a vascular pedicle.  The skin margins were undermined to an appropriate distance in all directions utilizing iris scissors.  The flap was then placed into the defect and anchored with interrupted buried subcutaneous sutures.
Abbe Flap (Lower To Upper Lip) Text: The defect of the upper lip was assessed and measured.  Given the location and size of the defect, an Abbe flap was deemed most appropriate.  Using a sterile surgical marker, an appropriate Abbe flap was measured and drawn on the lower lip. Local anesthesia was then infiltrated. A scalpel was then used to incise the upper lip through and through the skin, vermilion, muscle and mucosa, leaving the flap pedicled on the opposite side.  The flap was then rotated and transferred to the lower lip defect.  The flap was then sutured into place with a three layer technique, closing the orbicularis oris muscle layer with subcutaneous buried sutures, followed by a mucosal layer and an epidermal layer.
Abbe Flap (Upper To Lower Lip) Text: The defect of the lower lip was assessed and measured.  Given the location and size of the defect, an Abbe flap was deemed most appropriate.  Using a sterile surgical marker, an appropriate Abbe flap was measured and drawn on the upper lip. Local anesthesia was then infiltrated.  A scalpel was then used to incise the upper lip through and through the skin, vermilion, muscle and mucosa, leaving the flap pedicled on the opposite side.  The flap was then rotated and transferred to the lower lip defect.  The flap was then sutured into place with a three layer technique, closing the orbicularis oris muscle layer with subcutaneous buried sutures, followed by a mucosal layer and an epidermal layer.
Cheek Interpolation Flap Text: A decision was made to reconstruct the defect utilizing an interpolation axial flap and a staged reconstruction.  A telfa template was made of the defect.  This telfa template was then used to outline the Cheek Interpolation flap.  The donor area for the pedicle flap was then injected with anesthesia.  The flap was excised through the skin and subcutaneous tissue down to the layer of the underlying musculature.  The interpolation flap was carefully excised within this deep plane to maintain its blood supply.  The edges of the donor site were undermined.   The donor site was closed in a primary fashion.  The pedicle was then rotated into position and sutured.  Once the tube was sutured into place, adequate blood supply was confirmed with blanching and refill.  The pedicle was then wrapped with xeroform gauze and dressed appropriately with a telfa and gauze bandage to ensure continued blood supply and protect the attached pedicle.
Cheek-To-Nose Interpolation Flap Text: A decision was made to reconstruct the defect utilizing an interpolation axial flap and a staged reconstruction.  A telfa template was made of the defect.  This telfa template was then used to outline the Cheek-To-Nose Interpolation flap.  The donor area for the pedicle flap was then injected with anesthesia.  The flap was excised through the skin and subcutaneous tissue down to the layer of the underlying musculature.  The interpolation flap was carefully excised within this deep plane to maintain its blood supply.  The edges of the donor site were undermined.   The donor site was closed in a primary fashion.  The pedicle was then rotated into position and sutured.  Once the tube was sutured into place, adequate blood supply was confirmed with blanching and refill.  The pedicle was then wrapped with xeroform gauze and dressed appropriately with a telfa and gauze bandage to ensure continued blood supply and protect the attached pedicle.
Estlander Flap (Lower To Upper Lip) Text: The defect of the lower lip was assessed and measured.  Given the location and size of the defect, an Estlander flap was deemed most appropriate.  Using a sterile surgical marker, an appropriate Estlander flap was measured and drawn on the upper lip. Local anesthesia was then infiltrated. A scalpel was then used to incise the lateral aspect of the flap, through skin, muscle and mucosa, leaving the flap pedicled medially.  The flap was then rotated and positioned to fill the lower lip defect.  The flap was then sutured into place with a three layer technique, closing the orbicularis oris muscle layer with subcutaneous buried sutures, followed by a mucosal layer and an epidermal layer.
Interpolation Flap Text: A decision was made to reconstruct the defect utilizing an interpolation axial flap and a staged reconstruction.  A telfa template was made of the defect.  This telfa template was then used to outline the interpolation flap.  The donor area for the pedicle flap was then injected with anesthesia.  The flap was excised through the skin and subcutaneous tissue down to the layer of the underlying musculature.  The interpolation flap was carefully excised within this deep plane to maintain its blood supply.  The edges of the donor site were undermined.   The donor site was closed in a primary fashion.  The pedicle was then rotated into position and sutured.  Once the tube was sutured into place, adequate blood supply was confirmed with blanching and refill.  The pedicle was then wrapped with xeroform gauze and dressed appropriately with a telfa and gauze bandage to ensure continued blood supply and protect the attached pedicle.
Melolabial Interpolation Flap Text: A decision was made to reconstruct the defect utilizing an interpolation axial flap and a staged reconstruction.  A telfa template was made of the defect.  This telfa template was then used to outline the melolabial interpolation flap.  The donor area for the pedicle flap was then injected with anesthesia.  The flap was excised through the skin and subcutaneous tissue down to the layer of the underlying musculature.  The pedicle flap was carefully excised within this deep plane to maintain its blood supply.  The edges of the donor site were undermined.   The donor site was closed in a primary fashion.  The pedicle was then rotated into position and sutured.  Once the tube was sutured into place, adequate blood supply was confirmed with blanching and refill.  The pedicle was then wrapped with xeroform gauze and dressed appropriately with a telfa and gauze bandage to ensure continued blood supply and protect the attached pedicle.
Mastoid Interpolation Flap Text: A decision was made to reconstruct the defect utilizing an interpolation axial flap and a staged reconstruction.  A telfa template was made of the defect.  This telfa template was then used to outline the mastoid interpolation flap.  The donor area for the pedicle flap was then injected with anesthesia.  The flap was excised through the skin and subcutaneous tissue down to the layer of the underlying musculature.  The pedicle flap was carefully excised within this deep plane to maintain its blood supply.  The edges of the donor site were undermined.   The donor site was closed in a primary fashion.  The pedicle was then rotated into position and sutured.  Once the tube was sutured into place, adequate blood supply was confirmed with blanching and refill.  The pedicle was then wrapped with xeroform gauze and dressed appropriately with a telfa and gauze bandage to ensure continued blood supply and protect the attached pedicle.
Paramedian Forehead Flap Text: A decision was made to reconstruct the defect utilizing an interpolation axial flap and a staged reconstruction.  A telfa template was made of the defect.  This telfa template was then used to outline the paramedian forehead pedicle flap.  The donor area for the pedicle flap was then injected with anesthesia.  The flap was excised through the skin and subcutaneous tissue down to the layer of the underlying musculature.  The pedicle flap was carefully excised within this deep plane to maintain its blood supply.  The edges of the donor site were undermined.   The donor site was closed in a primary fashion.  The pedicle was then rotated into position and sutured.  Once the tube was sutured into place, adequate blood supply was confirmed with blanching and refill.  The pedicle was then wrapped with xeroform gauze and dressed appropriately with a telfa and gauze bandage to ensure continued blood supply and protect the attached pedicle.
Posterior Auricular Interpolation Flap Text: A decision was made to reconstruct the defect utilizing an interpolation axial flap and a staged reconstruction.  A telfa template was made of the defect.  This telfa template was then used to outline the posterior auricular interpolation flap.  The donor area for the pedicle flap was then injected with anesthesia.  The flap was excised through the skin and subcutaneous tissue down to the layer of the underlying musculature.  The pedicle flap was carefully excised within this deep plane to maintain its blood supply.  The edges of the donor site were undermined.   The donor site was closed in a primary fashion.  The pedicle was then rotated into position and sutured.  Once the tube was sutured into place, adequate blood supply was confirmed with blanching and refill.  The pedicle was then wrapped with xeroform gauze and dressed appropriately with a telfa and gauze bandage to ensure continued blood supply and protect the attached pedicle.
Cheiloplasty (Complex) Text: A decision was made to reconstruct the defect with a  cheiloplasty.  The defect was undermined extensively.  Additional orbicularis oris muscle was excised with a 15 blade scalpel.  The defect was converted into a full thickness wedge to facilite a better cosmetic result.  Small vessels were then tied off with 5-0 monocyrl. The orbicularis oris, superficial fascia, adipose and dermis were then reapproximated.  After the deeper layers were approximated the epidermis was reapproximated with particular care given to realign the vermilion border.
Cheiloplasty (Less Than 50%) Text: A decision was made to reconstruct the defect with a  cheiloplasty.  The defect was undermined extensively.  Additional orbicularis oris muscle was excised with a 15 blade scalpel.  The defect was converted into a full thickness wedge, of less than 50% of the vertical height of the lip, to facilite a better cosmetic result.  Small vessels were then tied off with 5-0 monocyrl. The orbicularis oris, superficial fascia, adipose and dermis were then reapproximated.  After the deeper layers were approximated the epidermis was reapproximated with particular care given to realign the vermilion border.
Ear Wedge Repair Text: A wedge excision was completed by carrying down an excision through the full thickness of the ear and cartilage with an inward facing Burow's triangle. The wound was then closed in a layered fashion.
Full Thickness Lip Wedge Repair (Flap) Text: Given the location of the defect and the proximity to free margins a full thickness wedge repair was deemed most appropriate.  Using a sterile surgical marker, the appropriate repair was drawn incorporating the defect and placing the expected incisions perpendicular to the vermilion border.  The vermilion border was also meticulously outlined to ensure appropriate reapproximation during the repair.  The area thus outlined was incised through and through with a #15 scalpel blade.  The muscularis and dermis were reaproximated with deep sutures following hemostasis. Care was taken to realign the vermilion border before proceeding with the superficial closure.  Once the vermilion was realigned the superfical and mucosal closure was finished.
Burow's Graft Text: Given the location of the defect, shape of the defect, the proximity to free margins and the presence of a standing cone deformity a Burow's skin graft was deemed most appropriate. The risks, benefits, and possible outcomes of this procedure were discussed along with the risks, benefits, and possible outcomes of other options for wound repair (including but not limited to: variations of complex closure, flaps, other types of grafts, and second intention). The patient verbalized understanding and consent to the outlined procedure. The patient understands should the surgical site heal in a manner they find unsatisfactory further interventions or referral to an additional specialist may be required. The graft donor and recipient site were prepped and draped in sterile fashion. Anesthesia was checked and supplemented as necessary. The standing cone was removed and this tissue was then trimmed to the shape of the primary defect. The adipose tissue was also removed until only dermis and epidermis were left.  The skin margins of the secondary defect were undermined to an appropriate distance in all directions utilizing iris scissors.  The secondary defect was closed with interrupted buried subcutaneous sutures.  The skin graft was then placed in the primary defect and oriented appropriately. The graft was sutured into place with simple interrupted and simple running cutaneous sutures.
Cartilage Graft Text: The defect edges were debeveled with a #15 scalpel blade.  Given the location of the defect, shape of the defect, the fact the defect involved a full thickness cartilage defect a cartilage graft was deemed most appropriate.  An appropriate donor site was identified, cleansed, and anesthetized. The cartilage graft was then harvested and transferred to the recipient site, oriented appropriately and then sutured into place.  The secondary defect was then repaired using a primary closure.
Composite Graft Text: The defect edges were debeveled with a #15 scalpel blade.  Given the location of the defect, shape of the defect, the proximity to free margins and the fact the defect was full thickness a composite graft was deemed most appropriate.  The defect was outline and then transferred to the donor site.  A full thickness graft was then excised from the donor site. The graft was then placed in the primary defect, oriented appropriately and then sutured into place.  The secondary defect was then repaired using a primary closure.
Epidermal Autograft Text: The defect edges were debeveled with a #15 scalpel blade.  Given the location of the defect, shape of the defect and the proximity to free margins an epidermal autograft was deemed most appropriate.  Using a sterile surgical marker, the primary defect shape was transferred to the donor site. The epidermal graft was then harvested.  The skin graft was then placed in the primary defect and oriented appropriately.
Dermal Autograft Text: The defect edges were debeveled with a #15 scalpel blade.  Given the location of the defect, shape of the defect and the proximity to free margins a dermal autograft was deemed most appropriate.  Using a sterile surgical marker, the primary defect shape was transferred to the donor site. The area thus outlined was incised deep to adipose tissue with a #15 scalpel blade.  The harvested graft was then trimmed of adipose and epidermal tissue until only dermis was left.  The skin graft was then placed in the primary defect and oriented appropriately.
Ftsg Text: Given the location of the defect, shape of the defect and the proximity to free margins a full thickness skin graft was deemed most appropriate. The risks, benefits, and possible outcomes of this procedure were discussed along with the risks, benefits, and possible outcomes of other options for wound repair (including but not limited to: variations of complex closure, flaps, other types of grafts, and second intention). The patient verbalized understanding and consent to the outlined procedure. The patient understands should the surgical site heal in a manner they find unsatisfactory further interventions or referral to an additional specialist may be required. The graft donor and recipient site were prepped and draped in sterile fashion. Anesthesia was checked and supplemented as necessary. The primary defect shape was templated with sterile suture packing and transferred to the donor site. The area thus outlined was incised, along with planned standing tissue cones, deep to adipose tissue with a #15 scalpel blade.  The harvested graft was then trimmed of adipose tissue until only dermis and epidermis was left.  The skin margins of the secondary defect were undermined to an appropriate distance in all directions utilizing fine tipped surgical scissors. Hemostasis was achieved and then the secondary defect was sutured with interrupted buried subcutaneous sutures. The skin graft was then placed in the primary defect and oriented appropriately. The graft was sutured into place with simple interrupted and simple running cutaneous sutures.
Pinch Graft Text: The defect edges were debeveled with a #15 scalpel blade. Given the location of the defect, shape of the defect and the proximity to free margins a pinch graft was deemed most appropriate. Using a sterile surgical marker, the primary defect shape was transferred to the donor site. The area thus outlined was incised deep to adipose tissue with a #15 scalpel blade.  The harvested graft was then trimmed of adipose tissue until only dermis and epidermis was left. The skin margins of the secondary defect were undermined to an appropriate distance in all directions utilizing iris scissors.  The secondary defect was closed with interrupted buried subcutaneous sutures.  The skin edges were then re-apposed with running  sutures.  The skin graft was then placed in the primary defect and oriented appropriately.
Skin Substitute Text: The defect edges were debeveled with a #15 scalpel blade.  Given the location of the defect, shape of the defect and the proximity to free margins a skin substitute graft was deemed most appropriate.  The graft material was trimmed to fit the size of the defect. The graft was then placed in the primary defect and oriented appropriately.
Split-Thickness Skin Graft Text: The defect edges were debeveled with a #15 scalpel blade.  Given the location of the defect, shape of the defect and the proximity to free margins a split thickness skin graft was deemed most appropriate.  Using a sterile surgical marker, the primary defect shape was transferred to the donor site. The split thickness graft was then harvested.  The skin graft was then placed in the primary defect and oriented appropriately.
Tissue Cultured Epidermal Autograft Text: The defect edges were debeveled with a #15 scalpel blade.  Given the location of the defect, shape of the defect and the proximity to free margins a tissue cultured epidermal autograft was deemed most appropriate.  The graft was then trimmed to fit the size of the defect.  The graft was then placed in the primary defect and oriented appropriately.
Xenograft Text: The defect edges were debeveled with a #15 scalpel blade.  Given the location of the defect, shape of the defect and the proximity to free margins a xenograft was deemed most appropriate.  The graft was then trimmed to fit the size of the defect.  The graft was then placed in the primary defect and oriented appropriately.
Complex Repair And Flap Additional Text (Will Appearing After The Standard Complex Repair Text): The complex repair was not sufficient to completely close the primary defect. The remaining additional defect was repaired with the flap mentioned below.
Complex Repair And Graft Additional Text (Will Appearing After The Standard Complex Repair Text): The complex repair was not sufficient to completely close the primary defect. The remaining additional defect was repaired with the graft mentioned below.
Eyelid Full Thickness Repair - 02611: The eyelid defect was full thickness which required a wedge repair of the eyelid. Special care was taken to ensure that the eyelid margin was realligned when placing sutures.
Eyelid Partial Thickness Repair - 86660: The eyelid defect was partial thickness which required a wedge repair of the eyelid. Special care was taken to ensure that the eyelid margin was realligned when placing sutures.
Intermediate Repair And Flap Additional Text (Will Appearing After The Standard Complex Repair Text): The intermediate repair was not sufficient to completely close the primary defect. The remaining additional defect was repaired with the flap mentioned below.
Intermediate Repair And Graft Additional Text (Will Appearing After The Standard Complex Repair Text): The intermediate repair was not sufficient to completely close the primary defect. The remaining additional defect was repaired with the graft mentioned below.
Localized Dermabrasion With 15 Blade Text: The patient was draped in routine manner.  Localized dermabrasion using a 15 blade was performed in routine manner to papillary dermis. This spot dermabrasion is being performed to complete skin cancer reconstruction. It also will eliminate the other sun damaged precancerous cells that are known to be part of the regional effect of a lifetime's worth of sun exposure. This localized dermabrasion is therapeutic and should not be considered cosmetic in any regard.
Localized Dermabrasion With Sand Papertext: The patient was draped in routine manner.  Localized dermabrasion using sterile sand paper was performed in routine manner to papillary dermis. This spot dermabrasion is being performed to complete skin cancer reconstruction. It also will eliminate the other sun damaged precancerous cells that are known to be part of the regional effect of a lifetime's worth of sun exposure. This localized dermabrasion is therapeutic and should not be considered cosmetic in any regard.
Localized Dermabrasion With Wire Brush Text: The patient was draped in routine manner.  Localized dermabrasion using 3 x 17 mm wire brush was performed in routine manner to papillary dermis. This spot dermabrasion is being performed to complete skin cancer reconstruction. It also will eliminate the other sun damaged precancerous cells that are known to be part of the regional effect of a lifetime's worth of sun exposure. This localized dermabrasion is therapeutic and should not be considered cosmetic in any regard.
Purse String (Simple) Text: Given the location of the defect and the characteristics of the surrounding skin a purse string closure was deemed most appropriate.  Undermining was performed circumferentially around the surgical defect.  A purse string suture was then placed and tightened.
Purse String (Intermediate) Text: Given the location of the defect and the characteristics of the surrounding skin a purse string intermediate closure was deemed most appropriate.  Undermining was performed circumferentially around the surgical defect.  A purse string suture was then placed and tightened.
Partial Purse String (Simple) Text: Given the location of the defect and the characteristics of the surrounding skin a simple purse string closure was deemed most appropriate.  Undermining was performed circumferentially around the surgical defect.  A purse string suture was then placed and tightened. Wound tension only allowed a partial closure of the circular defect.
Partial Purse String (Intermediate) Text: Given the location of the defect and the characteristics of the surrounding skin an intermediate purse string closure was deemed most appropriate.  Undermining was performed circumferentially around the surgical defect.  A purse string suture was then placed and tightened. Wound tension only allowed a partial closure of the circular defect.
Tarsorrhaphy Text: A tarsorrhaphy was performed using Frost sutures.
Manual Repair Warning Statement: We plan on removing the manually selected variable below in favor of our much easier automatic structured text blocks found in the previous tab. We decided to do this to help make the flow better and give you the full power of structured data. Manual selection is never going to be ideal in our platform and I would encourage you to avoid using manual selection from this point on, especially since I will be sunsetting this feature. It is important that you do one of two things with the customized text below. First, you can save all of the text in a word file so you can have it for future reference. Second, transfer the text to the appropriate area in the Library tab. Lastly, if there is a flap or graft type which we do not have you need to let us know right away so I can add it in before the variable is hidden. No need to panic, we plan to give you roughly 6 months to make the change.
Same Histology In Subsequent Stages Text: The pattern and morphology of the tumor is as described in the first stage.
No Residual Tumor Seen Histology Text: There were no malignant cells seen in the sections examined.
Inflammation Suggestive Of Cancer Camouflage Histology Text: There was a dense lymphocytic infiltrate which prevented adequate histologic evaluation of adjacent structures.
Incidental Superficial Basal Cell Carcinoma Histology Text: One or more foci of “budding” basaloid tumor with peripheral palisading is attached superficially to the epidermis and/or adjacent superficial hair follicle(s) and is surrounded by a myxoid stroma.
Bcc Histology Text: There were numerous aggregates of basaloid cells.
Bcc Infiltrative Histology Text: Thin cords of basaloid tumor with angulated ends/spiky islands composed of few to many basaloid keratinocytes, embedded in a myxoid to sclerotic stroma. Retraction of the tumor islands away from the surrounding stroma is variable.
Bcc Micronodular Histology Text: Multiple small aggregates of basaloid cells are present within the dermis. These small nodules display variable subtle peripheral palisading and retraction artifact.
Bcc  Morpheaform/Sclerosing Histology Text: Thin cords of basaloid cells surrounded by a sclerotic collagenous stroma, with mostly absent peripheral palisading and stromal cleft formation.
Bcc  Nodular Histology Text: Discrete nests/nodules of malignant basaloid tumor is present within the dermis and/or attached to the epidermis. The tumor demonstrates palisading and is retracted away from a surrounding mucoid stroma.
Bcc  Nodulocystic Histology Text: Discrete nests/nodules of malignant basaloid tumor is present within the dermis and/or attached to the epidermis. The tumor demonstrates palisading and is retracted away from a surrounding mucoid stroma. Some areas of tumor show cystic degeneration.
Bcc Pigmented Histology Text: Discrete nests/nodules of malignant basaloid tumor is present within the dermis and/or attached to the epidermis. The tumor demonstrates palisading and is retracted away from a surrounding mucoid stroma. Some areas of tumor show deposition of melanin.
Bcc Superficial Histology Text: Multiple foci of “budding” basaloid tumor with peripheral palisading is attached superficially to the epidermis and/or adjacent superficial hair follicle(s) and is surrounded by a myxoid stroma.
Mixed Superficial And Nodular Bcc Histology Text: Histologic features of both superficial (Multiple foci of “budding” basaloid tumor with peripheral palisading is attached superficially to the epidermis and/or adjacent superficial hair follicle(s) and is surrounded by a myxoid stroma) AND nodular (Discrete nests/nodules of malignant basaloid tumor is present within the dermis and/or attached to the epidermis. The tumor demonstrates palisading and is retracted away from a surrounding mucoid stroma) basal cell carcinoma are present.
Mixed Nodular And Infiltrative Bcc Histology Text: Histologic features of both nodular (Discrete nests/nodules of malignant basaloid tumor is present within the dermis and/or attached to the epidermis. The tumor demonstrates palisading and is retracted away from a surrounding mucoid stroma) and infiltrative (Thin cords of basaloid tumor with angulated ends/spiky islands composed of few to many basaloid keratinocytes, embedded in a myxoid to sclerotic stroma. Retraction of the tumor islands away from the surrounding stroma is variable) basal cell carcinoma are present.
Mixed Nodular And Micronodular Bcc Histology Text: Histologic features of both nodular (Discrete nests/nodules of malignant basaloid tumor is present within the dermis and/or attached to the epidermis. The tumor demonstrates palisading and is retracted away from a surrounding mucoid stroma) and micronodular (Multiple small aggregates of basaloid cells are present within the dermis. These small nodules display variable subtle peripheral palisading and retraction artifact) basal cell carcinoma are present.
Metatypical Bcc Histology Text: areas of basaloid tumor with variable eosinophilic features and occasional apoptotic cells are visualized in the dermis. Peripheral palisading and stromal retraction is rare to absent.
Scc Histology Text: Full thickness keratinocyte atypia is observed microscopically. Focal dyskeratosis and apoptosis of keratinocytes is observed. The epidermis is acanthotic with growth of atypical keratinocytes beyond the level of the sebaceous glands.
Scc Moderately Differentiated Histology Text: Full thickness keratinocyte atypia is observed microscopically. Focal dyskeratosis and apoptosis of keratinocytes is observed. The epidermis is acanthotic with growth of atypical keratinocytes beyond the level of the sebaceous glands, many groups of keratinocytes display abnormal or absent keratin formation.
Scc Poorly Differentiated Histology Text: Full thickness keratinocyte atypia is observed microscopically. Focal dyskeratosis and apoptosis of keratinocytes is observed. The epidermis is acanthotic with growth of atypical keratinocytes beyond the level of the sebaceous glands, most keratinocytes show little to no keratinization.
Scc In Situ Histology Text: full thickness keratinocyte atypia is present within the epidermis with overlying loss of the granular cell layer and overlying parakeratosis.
Scc In Situ With Follicular Extension Histology Text: full thickness keratinocyte atypia is present within the epidermis with overlying loss of the granular cell layer and overlying parakeratosis. Adjacent follicular units are notably involved.
Mart-1 - Positive Histology Text: MART-1 staining demonstrates areas of higher density and clustering of melanocytes with Pagetoid spread upwards within the epidermis. The surgical margins are positive for tumor cells.
Mart-1 - Negative Histology Text: MART-1 staining demonstrates a normal density and pattern of melanocytes along the dermal-epidermal junction. The surgical margins are negative for tumor cells.
Information: Selecting Yes will display possible errors in your note based on the variables you have selected. This validation is only offered as a suggestion for you. PLEASE NOTE THAT THE VALIDATION TEXT WILL BE REMOVED WHEN YOU FINALIZE YOUR NOTE. IF YOU WANT TO FAX A PRELIMINARY NOTE YOU WILL NEED TO TOGGLE THIS TO 'NO' IF YOU DO NOT WANT IT IN YOUR FAXED NOTE.
Bill 59 Modifier?: No - Continue to Bill 79 Modifier

## 2025-03-20 ENCOUNTER — APPOINTMENT (OUTPATIENT)
Dept: URBAN - METROPOLITAN AREA SURGERY 18 | Age: 80
Setting detail: DERMATOLOGY
End: 2025-03-20

## 2025-03-20 VITALS — DIASTOLIC BLOOD PRESSURE: 97 MMHG | SYSTOLIC BLOOD PRESSURE: 145 MMHG | HEART RATE: 95 BPM

## 2025-03-20 DIAGNOSIS — Z48.817 ENCOUNTER FOR SURGICAL AFTERCARE FOLLOWING SURGERY ON THE SKIN AND SUBCUTANEOUS TISSUE: ICD-10-CM

## 2025-03-20 PROBLEM — C44.311 BASAL CELL CARCINOMA OF SKIN OF NOSE: Status: ACTIVE | Noted: 2025-03-20

## 2025-03-20 PROCEDURE — OTHER COUNSELING: OTHER

## 2025-03-20 PROCEDURE — 14060 TIS TRNFR E/N/E/L 10 SQ CM/<: CPT | Mod: 79

## 2025-03-20 PROCEDURE — 17311 MOHS 1 STAGE H/N/HF/G: CPT | Mod: 79

## 2025-03-20 PROCEDURE — OTHER MOHS SURGERY: OTHER

## 2025-03-20 PROCEDURE — OTHER POST-OP WOUND CHECK: OTHER

## 2025-03-20 PROCEDURE — 99024 POSTOP FOLLOW-UP VISIT: CPT

## 2025-03-20 PROCEDURE — OTHER MIPS QUALITY: OTHER

## 2025-03-20 ASSESSMENT — LOCATION ZONE DERM: LOCATION ZONE: FACE

## 2025-03-20 ASSESSMENT — LOCATION SIMPLE DESCRIPTION DERM: LOCATION SIMPLE: RIGHT CHEEK

## 2025-03-20 ASSESSMENT — LOCATION DETAILED DESCRIPTION DERM: LOCATION DETAILED: RIGHT SUPERIOR PREAURICULAR CHEEK

## 2025-03-20 NOTE — PROCEDURE: POST-OP WOUND CHECK
Detail Level: Detailed
Add 01530 Cpt? (Important Note: In 2017 The Use Of 71057 Is Being Tracked By Cms To Determine Future Global Period Reimbursement For Global Periods): yes
Wound Evaluated By: Dr. Edmar Mora
Additional Comments: 1 week wound check Site is healing within normal limits \\nBandage changed today \\nAdvised patient she can go to daily bandage changes today with cleansing Continue to bandage until completely healed

## 2025-03-20 NOTE — PROCEDURE: MOHS SURGERY
Mohs Case Number: 
Date Of Previous Biopsy (Optional): 03/03/2025
Previous Accession (Optional): [id number]
Biopsy Photograph Reviewed: Yes
Referring Physician (Optional): Valerie Mora M.D.
Consent Type: Consent (Nose Lesion)
Eye Shield Used: No
Surgeon Performing Repair (Optional): Dr. Edmar Mora
Initial Size Of Lesion: 0.8
X Size Of Lesion In Cm (Optional): 0.7
Number Of Stages: 1
Primary Defect Width In Cm (Final Defect Size - Required For Flaps/Grafts): 0.9
Primary Defect Depth In Cm (Optional But Required For Some Insurers): 0
Repair Type: Flap
Which Instrument Did You Use For Dermabrasion?: Wire Brush
Which Eyelid Repair Cpt Are You Using?: 17910
Oculoplastic Surgeon Procedure Text (A): After obtaining clear surgical margins the patient was sent to oculoplastics for surgical repair.  The patient understands they will receive post-surgical care and follow-up from the referring physician's office.
Otolaryngologist Procedure Text (A): After obtaining clear surgical margins the patient was sent to otolaryngology for surgical repair.  The patient understands they will receive post-surgical care and follow-up from the referring physician's office.
Plastic Surgeon Procedure Text (A): After obtaining clear surgical margins the patient was sent to plastics for surgical repair.  The patient understands they will receive post-surgical care and follow-up from the referring physician's office.
Mid-Level Procedure Text (A): After obtaining clear surgical margins the patient was sent to a mid-level provider for surgical repair.  The patient understands they will receive post-surgical care and follow-up from the mid-level provider.
Provider Procedure Text (A): After obtaining clear surgical margins the defect was repaired by another provider.
Asc Procedure Text (A): After obtaining clear surgical margins the patient was sent to an ASC for surgical repair.  The patient understands they will receive post-surgical care and follow-up from the ASC physician.
Deep Sutures: 4-0 PGCL
Epidermal Sutures: 5-0 Plain Gut
Suturegard Retention Suture: 2-0 Nylon
Retention Suture Bite Size: 3 mm
Length To Time In Minutes Device Was In Place: 10
Undermining Type: Entire Wound
Debridement Text: The wound edges were debrided prior to proceeding with the closure to facilitate wound healing.
Helical Rim Text: The closure involved the helical rim.
Vermilion Border Text: The closure involved the vermilion border.
Nostril Rim Text: The closure involved the nostril rim.
Retention Suture Text: Retention sutures were placed to support the closure and prevent dehiscence.
Flap Type: Modified Advancement Flap
Secondary Defect Length In Cm (Required For Flaps): 2.1
Secondary Defect Width In Cm (Required For Flaps): 1.8
Location Indication Override (Is Already Calculated Based On Selected Body Location): Area H
Area H Indication Text: Tumors in this location are included in Area H (eyelids, eyebrows, nose, lips, chin, ear, pre-auricular, post-auricular, temple, genitalia, hands, feet, ankles and areola).  Tissue conservation is critical in these anatomic locations.
Area M Indication Text: Tumors in this location are included in Area M (cheek, forehead, scalp, neck, jawline and pretibial skin).  Mohs surgery is indicated for tumors in these anatomic locations.
Area L Indication Text: Tumors in this location are included in Area L (trunk and extremities).  Mohs surgery is indicated for larger tumors, or tumors with aggressive histologic features, in these anatomic locations.
Tumor Debulked?: scalpel
Depth Of Tumor Invasion (For Histology): tumor not visualized (deep and peripheral margins are clear of tumor)
Perineural Invasion (For Histology - Be Specific If Possible): absent
Special Stains Stage 1 - Results: Base On Clearance Noted Above
Stage 2: Additional Anesthesia Type: 1% lidocaine with 1:100,000 epinephrine and 408mcg clindamycin/ml and a 1:10 solution of 8.4% sodium bicarbonate
Staging Info: By selecting yes to the question above you will include information on AJCC 8 tumor staging in your Mohs note. Information on tumor staging will be automatically added for SCCs on the head and neck. AJCC 8 includes tumor size, tumor depth, perineural involvement and bone invasion.
Tumor Depth: Less than 6mm from granular layer and no invasion beyond the subcutaneous fat
Why Was The Change Made?: Please Select the Appropriate Response
Medical Necessity Statement: Based on my medical judgement, Mohs surgery is the most appropriate treatment for this cancer compared to other treatments.
Alternatives Discussed Intro (Do Not Add Period): I discussed alternative treatments to Mohs surgery and specifically discussed the risks and benefits of
Consent 1/Introductory Paragraph: The rationale for Mohs was explained to the patient and consent was obtained. The risks, benefits and alternatives to therapy were discussed in detail. Specifically, the risks of infection, scarring, bleeding, prolonged wound healing, incomplete removal, allergy to anesthesia, nerve injury and recurrence were addressed. Prior to the procedure, the treatment site was clearly identified and confirmed by the patient. All components of Universal Protocol/PAUSE Rule completed.
Consent 2/Introductory Paragraph: Mohs surgery was explained to the patient and consent was obtained. The risks, benefits and alternatives to therapy were discussed in detail. Specifically, the risks of infection, scarring, bleeding, prolonged wound healing, incomplete removal, allergy to anesthesia, nerve injury and recurrence were addressed. Prior to the procedure, the treatment site was clearly identified and confirmed by the patient. All components of Universal Protocol/PAUSE Rule completed.
Consent 3/Introductory Paragraph: I gave the patient a chance to ask questions they had about the procedure.  Following this I explained the Mohs procedure and consent was obtained. The risks, benefits and alternatives to therapy were discussed in detail. Specifically, the risks of infection, scarring, bleeding, prolonged wound healing, incomplete removal, allergy to anesthesia, nerve injury and recurrence were addressed. Prior to the procedure, the treatment site was clearly identified and confirmed by the patient. All components of Universal Protocol/PAUSE Rule completed.
Consent (Temporal Branch)/Introductory Paragraph: The rationale for Mohs was explained to the patient and consent was obtained. The risks, benefits and alternatives to therapy were discussed in detail. Specifically, the risks of damage to the temporal branch of the facial nerve, infection, scarring, bleeding, prolonged wound healing, incomplete removal, allergy to anesthesia, and recurrence were addressed. Prior to the procedure, the treatment site was clearly identified and confirmed by the patient. All components of Universal Protocol/PAUSE Rule completed.
Consent (Marginal Mandibular)/Introductory Paragraph: The rationale for Mohs was explained to the patient and consent was obtained. The risks, benefits and alternatives to therapy were discussed in detail. Specifically, the risks of damage to the marginal mandibular branch of the facial nerve, infection, scarring, bleeding, prolonged wound healing, incomplete removal, allergy to anesthesia, and recurrence were addressed. Prior to the procedure, the treatment site was clearly identified and confirmed by the patient. All components of Universal Protocol/PAUSE Rule completed.
Consent (Spinal Accessory)/Introductory Paragraph: The rationale for Mohs was explained to the patient and consent was obtained. The risks, benefits and alternatives to therapy were discussed in detail. Specifically, the risks of damage to the spinal accessory nerve, infection, scarring, bleeding, prolonged wound healing, incomplete removal, allergy to anesthesia, and recurrence were addressed. Prior to the procedure, the treatment site was clearly identified and confirmed by the patient. All components of Universal Protocol/PAUSE Rule completed.
Consent (Near Eyelid Margin)/Introductory Paragraph: The rationale for Mohs was explained to the patient and consent was obtained. The risks, benefits and alternatives to therapy were discussed in detail. Specifically, the risks of ectropion or eyelid deformity, infection, scarring, bleeding, prolonged wound healing, incomplete removal, allergy to anesthesia, nerve injury and recurrence were addressed. Prior to the procedure, the treatment site was clearly identified and confirmed by the patient. All components of Universal Protocol/PAUSE Rule completed.
Consent (Ear)/Introductory Paragraph: The rationale for Mohs was explained to the patient and consent was obtained. The risks, benefits and alternatives to therapy were discussed in detail. Specifically, the risks of ear deformity, infection, scarring, bleeding, prolonged wound healing, incomplete removal, allergy to anesthesia, nerve injury and recurrence were addressed. Prior to the procedure, the treatment site was clearly identified and confirmed by the patient. All components of Universal Protocol/PAUSE Rule completed.
Consent (Nose)/Introductory Paragraph: The rationale for Mohs was explained to the patient and consent was obtained. The risks, benefits and alternatives to therapy were discussed in detail. Specifically, the risks of nasal deformity, changes in the flow of air through the nose, infection, scarring, bleeding, prolonged wound healing, incomplete removal, allergy to anesthesia, nerve injury and recurrence were addressed. Prior to the procedure, the treatment site was clearly identified and confirmed by the patient. All components of Universal Protocol/PAUSE Rule completed.
Consent (Lip)/Introductory Paragraph: The rationale for Mohs was explained to the patient and consent was obtained. The risks, benefits and alternatives to therapy were discussed in detail. Specifically, the risks of lip deformity, changes in the oral aperture, infection, scarring, bleeding, prolonged wound healing, incomplete removal, allergy to anesthesia, nerve injury and recurrence were addressed. Prior to the procedure, the treatment site was clearly identified and confirmed by the patient. All components of Universal Protocol/PAUSE Rule completed.
Consent (Scalp)/Introductory Paragraph: The rationale for Mohs was explained to the patient and consent was obtained. The risks, benefits and alternatives to therapy were discussed in detail. Specifically, the risks of changes in hair growth pattern secondary to repair, infection, scarring, bleeding, prolonged wound healing, incomplete removal, allergy to anesthesia, nerve injury and recurrence were addressed. Prior to the procedure, the treatment site was clearly identified and confirmed by the patient. All components of Universal Protocol/PAUSE Rule completed.
Detail Level: Detailed
Postop Diagnosis: same
Anesthesia Volume In Cc: 3
Additional Anesthesia Type: 0.25% bupivacaine
Additional Anesthesia Override: 1.5
Hemostasis: Electrodesiccation
Estimated Blood Loss (Cc): minimal
Stage 8: Additional Anesthesia Type: 1% lidocaine with epinephrine
Repair Anesthesia Method: local infiltration
Brow Lift Text: A midfrontal incision was made medially to the defect to allow access to the tissues just superior to the left eyebrow. Following careful dissection inferiorly in a supraperiosteal plane to the level of the left eyebrow, several 3-0 monocryl sutures were used to resuspend the eyebrow orbicularis oculi muscular unit to the superior frontal bone periosteum. This resulted in an appropriate reapproximation of static eyebrow symmetry and correction of the left brow ptosis.
Epidermal Closure: running cuticular
Suturegard Intro: Intraoperative tissue expansion was performed, utilizing the SUTUREGARD device, in order to reduce wound tension.
Suturegard Body: The suture ends were repeatedly re-tightened and re-clamped to achieve the desired tissue expansion.
Hemigard Intro: Due to skin fragility and wound tension, it was decided to use HEMIGARD adhesive retention suture devices to permit a linear closure. The skin was cleaned and dried for a 6cm distance away from the wound. Excessive hair, if present, was removed to allow for adhesion.
Hemigard Postcare Instructions: The HEMIGARD strips are to remain completely dry for at least 5-7 days.
Donor Site Anesthesia Type: same as repair anesthesia
Graft Basting Suture (Optional): 5-0 Fast Absorbing Gut
Graft Donor Site Dermal Sutures (Optional): 4-0 Monocryl
Epidermal Closure Graft Donor Site (Optional): simple interrupted
Graft Donor Site Bandage (Optional-Leave Blank If You Don't Want In Note): Vaseline, telfa pad, and hypoallergenic tape were applied to the donor site.
Closure 2 Information: This tab is for additional flaps and grafts, including complex repair and grafts and complex repair and flaps. You can also specify a different location for the additional defect, if the location is the same you do not need to select a new one. We will insert the automated text for the repair you select below just as we do for solitary flaps and grafts. Please note that at this time if you select a location with a different insurance zone you will need to override the ICD10 and CPT if appropriate.
Closure 3 Information: This tab is for additional flaps and grafts above and beyond our usual structured repairs.  Please note if you enter information here it will not currently bill and you will need to add the billing information manually.
Wound Care: Petrolatum
Dressing: pressure dressing with telfa
Dressing (No Sutures): dry sterile dressing
Unna Boot Text: An Unna boot was placed to help immobilize the limb and facilitate more rapid healing.
Home Suture Removal Text: Patient was provided instructions on removing sutures and will remove their sutures at home.  If they have any questions or difficulties they will call the office.
Post-Care Instructions: I reviewed with the patient in detail post-care instructions. Patient is not to engage in any heavy lifting, exercise, or swimming for the next 14 days. Should the patient develop any fevers, chills, bleeding, severe pain patient will contact the office immediately.
Pain Refusal Text: I offered to prescribe pain medication but the patient refused to take this medication.
Mauc Instructions: By selecting yes to the question below the MAUC number will be added into the note.  This will be calculated automatically based on the diagnosis chosen, the size entered, the body zone selected (H,M,L) and the specific indications you chose. You will also have the option to override the Mohs AUC if you disagree with the automatically calculated number and this option is found in the Case Summary tab.
Where Do You Want The Question To Include Opioid Counseling Located?: Case Summary Tab
Eye Protection Verbiage: Before proceeding with the stage, a plastic scleral shield was inserted. The globe was anesthetized with a few drops of 1% lidocaine with 1:100,000 epinephrine. Then, an appropriate sized scleral shield was chosen and coated with lacrilube ointment. The shield was gently inserted and left in place for the duration of each stage. After the stage was completed, the shield was gently removed.
Mohs Method Verbiage: An incision at a 45 degree angle following the standard Mohs approach was done and the specimen was harvested as a microscopic controlled layer.
Surgeon/Pathologist Verbiage (Will Incorporate Name Of Surgeon From Intro If Not Blank): operated in two distinct and integrated capacities as the surgeon and pathologist.
Mohs Histo Method Verbiage: Each section was then chromacoded and processed in the Mohs lab using the Mohs protocol and submitted for frozen section.
Subsequent Stages Histo Method Verbiage: Using a similar technique to that described above, a thin layer of tissue was removed from all areas where tumor was visible on the previous stage.  The tissue was again oriented, mapped, dyed, and processed as above.
Mohs Rapid Report Verbiage: The area of clinically evident tumor was marked with skin marking ink and appropriately hatched.  The initial incision was made following the Mohs approach through the skin.  The specimen was taken to the lab, divided into the necessary number of pieces, chromacoded and processed according to the Mohs protocol.  This was repeated in successive stages until a tumor free defect was achieved.
Complex Repair Preamble Text (Leave Blank If You Do Not Want): The risks, benefits, and possible outcomes of this procedure were discussed along with the risks, benefits, and possible outcomes of other options for wound repair (including but not limited to: variations of complex closure, flaps, grafts, and second intention). The patient verbalized understanding and consent to the outlined procedure. The patient understands should the surgical site heal in a manner they find unsatisfactory further interventions or referral to an additional specialist may be required. Following sterile preparation and drape, two standing cones were removed at opposite ends of the postoperative defect within, or parallel to, the relaxed skin tension lines. The resulting defect was undermined widely and bilaterally in the appropriate surgical plane taking care to preserve local arteries, veins, nerves, and other structures of anatomic importance. Hemostasis was achieved and then the wound was sutured together in layered fashion.
Crescentic Complex Repair Preamble Text (Leave Blank If You Do Not Want): Extensive wide undermining was performed.
Intermediate Repair Preamble Text (Leave Blank If You Do Not Want): The risks, benefits, and possible outcomes of this procedure were discussed along with the risks, benefits, and possible outcomes of other options for wound repair (including but not limited to: variations of complex closure, flaps, grafts, and second intention). The patient verbalized understanding and consent to the outlined procedure. The patient understands should the surgical site heal in a manner they find unsatisfactory further interventions or referral to an additional specialist may be required. Following sterile preparation and drape, two standing cones were removed at opposite ends of the postoperative defect (as needed) within, or parallel to, the relaxed skin tension lines. The resulting defect was undermined, as necessary, in the appropriate surgical plane taking care to preserve local arteries, veins, nerves, and other structures of anatomic importance. Hemostasis was achieved and then the wound was sutured together in layered fashion.
Crescentic Intermediate Repair Preamble Text (Leave Blank If You Do Not Want): Undermining was performed with blunt dissection.
Graft Cartilage Fenestration Text: The cartilage was fenestrated with a 2mm punch biopsy to help facilitate graft survival and healing.
Non-Graft Cartilage Fenestration Text: The cartilage was fenestrated with a 2mm punch biopsy to help facilitate healing.
Secondary Intention Text (Leave Blank If You Do Not Want): Given the location of the defect, depth of the postoperative wound and inherent tension at the surgical site, healing by secondary intention was deemed most appropriate for wound closure. The risks, benefits, and possible outcomes of this procedure were discussed along with the risks, benefits, and possible outcomes of other options for wound repair (including but not limited to: complex closure, flaps, and grafts). The patient verbalized understanding and voiced a preference for secondary intention over reconstruction. The patient understands that not all healing occurs in predictable fashion or within a predictable time period. If the resulting scar is unsatisfactory, affects/distorts a free margin or other adjacent structures, or is slow to heal the patient understands further surgical intervention or even referral to an additional specialist may be required.
No Repair - Repaired With Adjacent Surgical Defect Text (Leave Blank If You Do Not Want): After obtaining clear surgical margins the defect was repaired concurrently with another surgical defect which was in close approximation.
Adjacent Tissue Transfer Text: The defect edges were debeveled with a #15 scalpel blade.  Given the location of the defect and the proximity to free margins an adjacent tissue transfer was deemed most appropriate.  Using a sterile surgical marker, an appropriate flap was drawn incorporating the defect and placing the expected incisions within the relaxed skin tension lines where possible.    The area thus outlined was incised deep to adipose tissue with a #15 scalpel blade.  The skin margins were undermined to an appropriate distance in all directions utilizing iris scissors.
Advancement Flap (Single) Text: Given the location of the defect, inherent tension at the surgical site, and the proximity to free margins an advancement flap was deemed most appropriate for wound reconstruction. The risks, benefits, and possible outcomes of this procedure were discussed along with the risks, benefits, and possible outcomes of other options for wound repair (including but not limited to: complex closure, other flaps, grafts, and second intention). The patient verbalized understanding and consent to the outlined procedure. The patient verbalized understanding that intraoperative conditions may necessitate a change in the outlined procedure resulting in modification of the original surgical plan. This decision may be made at the discretion of the surgeon, and is due to factors subject to change or that are difficult to predict preoperatively. Using a sterile surgical marker, an appropriate advancement flap was drawn incorporating the defect and placing the expected incisions within the relaxed skin tension lines where possible. The surgical site and surrounding skin were prepped and draped in sterile fashion.  Standing cones were removed from opposite ends of the defect within the appropriate surgical plane, repositioning as necessary based upon local tension, proximity to free margins/other anatomic structures, and position of the relaxed skin tension lines. The resulting defect was undermined widely and bilaterally in the appropriate surgical plane taking care to preserve local arteries, veins, nerves, and other structures of anatomic importance. This created a sliding flap capable of advancing across the defect to close the wound under minimal tension. Hemostasis was achieved and then the wound was sutured in layered fashion.
Advancement Flap (Double) Text: Given the location of the defect, inherent tension at the surgical site, and the proximity to free margins a double (bilateral) advancement flap was deemed most appropriate for wound reconstruction. The risks, benefits, and possible outcomes of this procedure were discussed along with the risks, benefits, and possible outcomes of other options for wound repair (including but not limited to: complex closure, other flaps, grafts, and second intention). The patient verbalized understanding and consent to the outlined procedure. The patient verbalized understanding that intraoperative conditions may necessitate a change in the outlined procedure resulting in modification of the original surgical plan. This decision may be made at the discretion of the surgeon, and is due to factors subject to change or that are difficult to predict preoperatively. Using a sterile surgical marker, an appropriate bilateral advancement flap was drawn incorporating the defect and placing the expected incisions within the relaxed skin tension lines where possible. The surgical site and surrounding skin were prepped and draped in sterile fashion.  Standing cones were removed from opposite ends of the defect within the appropriate surgical plane, repositioning as necessary based upon local tension, proximity to free margins/other anatomic structures, and position of the relaxed skin tension lines. The resulting defect was undermined widely and bilaterally in the appropriate surgical plane taking care to preserve local arteries, veins, nerves, and other structures of anatomic importance. This created a sliding flap capable of advancing bilaterally across the defect to close the wound under minimal tension. Hemostasis was achieved and then the wound was sutured in layered fashion.
Advancement-Rotation Flap Text: The defect edges were debeveled with a #15 scalpel blade.  Given the location of the defect, shape of the defect and the proximity to free margins an advancement-rotation flap was deemed most appropriate.  Using a sterile surgical marker, an appropriate flap was drawn incorporating the defect and placing the expected incisions within the relaxed skin tension lines where possible. The area thus outlined was incised deep to adipose tissue with a #15 scalpel blade.  The skin margins were undermined to an appropriate distance in all directions utilizing iris scissors.
Alar Island Pedicle Flap Text: The defect edges were debeveled with a #15 scalpel blade.  Given the location of the defect, shape of the defect and the proximity to the alar rim an island pedicle advancement flap was deemed most appropriate.  Using a sterile surgical marker, an appropriate advancement flap was drawn incorporating the defect, outlining the appropriate donor tissue and placing the expected incisions within the nasal ala running parallel to the alar rim. The area thus outlined was incised with a #15 scalpel blade.  The skin margins were undermined minimally to an appropriate distance in all directions around the primary defect and laterally outward around the island pedicle utilizing iris scissors.  There was minimal undermining beneath the pedicle flap.
A-T Advancement Flap Text: Given the location of the defect, inherent tension at the surgical site, and the proximity to free margins an A to T advancement flap was deemed most appropriate for wound reconstruction. The risks, benefits, and possible outcomes of this procedure were discussed along with the risks, benefits, and possible outcomes of other options for wound repair (including but not limited to: complex closure, other flaps, grafts, and second intention). The patient verbalized understanding and consent to the outlined procedure. The patient verbalized understanding that intraoperative conditions may necessitate a change in the outlined procedure resulting in modification of the original surgical plan. This decision may be made at the discretion of the surgeon, and is due to factors subject to change or that are difficult to predict preoperatively. Using a sterile surgical marker, an appropriate A to T advancement flap was drawn incorporating the defect and placing the expected incisions within the relaxed skin tension lines where possible. The surgical site and surrounding skin were prepped and draped in sterile fashion.  Standing cones were removed from opposite ends of the defect within the appropriate surgical plane, repositioning as necessary based upon local tension, proximity to free margins/other anatomic structures, and position of the relaxed skin tension lines. The shape of one standing cone was modified, taking two smaller standing cones (each approximately half the size of the traditional standing cone) and repositioning them along opposite ends of a lateral plane (with respect to the defect).  The resulting defect was undermined widely and bilaterally in the appropriate surgical plane taking care to preserve local arteries, veins, nerves, and other structures of anatomic importance. This created a sliding flap capable of advancing across the defect to close the wound under minimal tension. Hemostasis was achieved and then the wound was sutured in layered fashion.
Banner Transposition Flap Text: The defect edges were debeveled with a #15 scalpel blade.  Given the location of the defect and the proximity to free margins a Banner transposition flap was deemed most appropriate.  Using a sterile surgical marker, an appropriate flap drawn around the defect. The area thus outlined was incised deep to adipose tissue with a #15 scalpel blade.  The skin margins were undermined to an appropriate distance in all directions utilizing iris scissors.
Bilateral Helical Rim Advancement Flap Text: The defect edges were debeveled with a #15 blade scalpel.  Given the location of the defect and the proximity to free margins (helical rim) a bilateral helical rim advancement flap was deemed most appropriate.  Using a sterile surgical marker, the appropriate advancement flaps were drawn incorporating the defect and placing the expected incisions between the helical rim and antihelix where possible.  The area thus outlined was incised through and through with a #15 scalpel blade.  With a skin hook and iris scissors, the flaps were gently and sharply undermined and freed up.
Bilateral Rotation Flap Text: The defect edges were debeveled with a #15 scalpel blade. Given the location of the defect, shape of the defect and the proximity to free margins a bilateral rotation flap was deemed most appropriate. Using a sterile surgical marker, an appropriate rotation flap was drawn incorporating the defect and placing the expected incisions within the relaxed skin tension lines where possible. The area thus outlined was incised deep to adipose tissue with a #15 scalpel blade. The skin margins were undermined to an appropriate distance in all directions utilizing iris scissors. Following this, the designed flap was carried over into the primary defect and sutured into place.
Bilobed Flap Text: Given the location of the defect, inherent tension at the surgical site, and the proximity to free margins a bilobe transposition flap was deemed most appropriate for wound reconstruction. The risks, benefits, and possible outcomes of this procedure were discussed along with the risks, benefits, and possible outcomes of other options for wound repair (including but not limited to: complex closure, other flaps, grafts, and second intention). The patient verbalized understanding and consent to the outlined procedure. The patient verbalized understanding that intraoperative conditions may necessitate a change in the outlined procedure resulting in modification of the original surgical plan. This decision may be made at the discretion of the surgeon, and is due to factors subject to change or that are difficult to predict preoperatively. We also discussed that should the patient heal with a scar they find noticeable/unfavorable additional surgical procedures and/or referral to additional specialists may be required. The patient understands the healing process takes place over many months and that the \"final\" appearance of the scar line often takes a year or longer to achieve.\\n\\nUsing a sterile surgical marker, an appropriate bilobed transposition flap was drawn incorporating the defect and placing the expected incisions within the relaxed skin tension lines where possible. The surgical site and surrounding skin were prepped and draped in sterile fashion.  A single standing cone was removed at the expected pivot point of the transposition flap in the appropriate surgical plane, repositioning as necessary based upon local tension, proximity to free margins/other anatomic structures, and position of the relaxed skin tension lines. The flap (containing two lobes  by 45 degrees, each proportionate in size to the postop defect) was incised and raised in the appropriate surgical plane. The resulting defect was undermined widely and bilaterally in the appropriate surgical plane taking care to preserve local arteries, veins, nerves, and other structures of anatomic importance. This created a flap capable of transposing across the defect to close the wound under minimal tension, without distortion of adjacent free margins. Hemostasis was achieved and then the wound was sutured in layered fashion.
Bi-Rhombic Flap Text: The defect edges were debeveled with a #15 scalpel blade.  Given the location of the defect and the proximity to free margins a bi-rhombic flap was deemed most appropriate.  Using a sterile surgical marker, an appropriate rhombic flap was drawn incorporating the defect. The area thus outlined was incised deep to adipose tissue with a #15 scalpel blade.  The skin margins were undermined to an appropriate distance in all directions utilizing iris scissors.
Burow's Advancement Flap Text: The defect edges were debeveled with a #15 scalpel blade.  Given the location of the defect and the proximity to free margins a Burow's advancement flap was deemed most appropriate.  Using a sterile surgical marker, the appropriate advancement flap was drawn incorporating the defect and placing the expected incisions within the relaxed skin tension lines where possible.    The area thus outlined was incised deep to adipose tissue with a #15 scalpel blade.  The skin margins were undermined to an appropriate distance in all directions utilizing iris scissors.
Chonodrocutaneous Helical Advancement Flap Text: Given the location of the defect, inherent tension at the surgical site, and the proximity to free margins a Burow’s advancement flap was deemed most appropriate for wound reconstruction. The risks, benefits, and possible outcomes of this procedure were discussed along with the risks, benefits, and possible outcomes of other options for wound repair (including but not limited to: complex closure, other flaps, grafts, and second intention). The patient verbalized understanding and consent to the outlined procedure. The patient verbalized understanding that intraoperative conditions may necessitate a change in the outlined procedure resulting in modification of the original surgical plan. This decision may be made at the discretion of the surgeon, and is due to factors subject to change or that are difficult to predict preoperatively. Using a sterile surgical marker, an appropriate Burow’s advancement flap was drawn incorporating the defect and placing the expected incisions within the relaxed skin tension lines where possible. The surgical site and surrounding skin were prepped and draped in sterile fashion.  Standing cones were removed from opposite ends of the defect within the appropriate surgical plane, repositioning as necessary based upon local tension, proximity to free margins/other anatomic structures, and position of the relaxed skin tension lines. The position of one dog ear was modified, moving the standing cone along a lateral plane to lie in a more favorable location for closure.  The resulting defect was undermined widely and bilaterally in the appropriate surgical plane taking care to preserve local arteries, veins, nerves, and other structures of anatomic importance. This created a sliding flap capable of advancing across the defect to close the wound under minimal tension. Hemostasis was achieved and then the wound was sutured in layered fashion.
Crescentic Advancement Flap Text: Given the location of the defect, inherent tension at the surgical site, and the proximity to free margins a Crescentic advancement flap was deemed most appropriate for wound reconstruction. The risks, benefits, and possible outcomes of this procedure were discussed along with the risks, benefits, and possible outcomes of other options for wound repair (including but not limited to: complex closure, other flaps, grafts, and second intention). The patient verbalized understanding and consent to the outlined procedure. The patient verbalized understanding that intraoperative conditions may necessitate a change in the outlined procedure resulting in modification of the original surgical plan. This decision may be made at the discretion of the surgeon, and is due to factors subject to change or that are difficult to predict preoperatively. Using a sterile surgical marker, an appropriate Crescentic advancement flap was drawn incorporating the defect and placing the expected incisions within the relaxed skin tension lines where possible. The surgical site and surrounding skin were prepped and draped in sterile fashion.  Standing cones were removed from opposite ends of the defect within the appropriate surgical plane, repositioning as necessary based upon local tension, proximity to free margins/other anatomic structures, and position of the relaxed skin tension lines. The shape of one standing cone was modified, designed in a crescentic shape to allow for sliding movement in both the horizontal and vertical plane.  The resulting defect was undermined widely and bilaterally in the appropriate surgical plane taking care to preserve local arteries, veins, nerves, and other structures of anatomic importance. This created a sliding flap capable of advancing across the defect to close the wound under minimal tension. Hemostasis was achieved and then the wound was sutured in layered fashion.
Dorsal Nasal Flap Text: Given the location of the defect, inherent tension at the surgical site, and the proximity to free margins a dorsal nasal flap was deemed most appropriate for wound reconstruction. The risks, benefits, and possible outcomes of this procedure were discussed along with the risks, benefits, and possible outcomes of other options for wound repair (including but not limited to: complex closure, other flaps, grafts, and second intention). The patient verbalized understanding and consent to the outlined procedure. The patient verbalized understanding that intraoperative conditions may necessitate a change in the outlined procedure resulting in modification of the original surgical plan. This decision may be made at the discretion of the surgeon, and is due to factors subject to change or that are difficult to predict preoperatively. We also discussed that should the patient heal with a scar they find noticeable/unfavorable additional surgical procedures and/or referral to additional specialists may be required. The patient understands the healing process takes place over many months and that the \"final\" appearance of the scar line often takes a year or longer to achieve.\\nUsing a sterile surgical marker, an appropriate dorsal nasal flap was drawn incorporating the defect and placing the expected incisions within the relaxed skin tension lines where possible. The surgical site and surrounding skin were prepped and draped in sterile fashion.  The area thus outlined was incised in the appropriate surgical plane with a #15 scalpel blade. The resulting defect was undermined widely and bilaterally in the appropriate surgical plane taking care to preserve local arteries, veins, nerves, and other structures of anatomic importance. This created a flap capable of rotating across the defect to close the wound under minimal tension. Hemostasis was obtained and then the flap was sutured together in layered fashion.
Double Island Pedicle Flap Text: The defect edges were debeveled with a #15 scalpel blade.  Given the location of the defect, shape of the defect and the proximity to free margins a double island pedicle advancement flap was deemed most appropriate.  Using a sterile surgical marker, an appropriate advancement flap was drawn incorporating the defect, outlining the appropriate donor tissue and placing the expected incisions within the relaxed skin tension lines where possible.    The area thus outlined was incised deep to adipose tissue with a #15 scalpel blade.  The skin margins were undermined to an appropriate distance in all directions around the primary defect and laterally outward around the island pedicle utilizing iris scissors.  There was minimal undermining beneath the pedicle flap.
Double O-Z Flap Text: The defect edges were debeveled with a #15 scalpel blade.  Given the location of the defect, shape of the defect and the proximity to free margins a Double O-Z flap was deemed most appropriate.  Using a sterile surgical marker, an appropriate transposition flap was drawn incorporating the defect and placing the expected incisions within the relaxed skin tension lines where possible. The area thus outlined was incised deep to adipose tissue with a #15 scalpel blade.  The skin margins were undermined to an appropriate distance in all directions utilizing iris scissors.
Double O-Z Plasty Text: The defect edges were debeveled with a #15 scalpel blade.  Given the location of the defect, shape of the defect and the proximity to free margins a Double O-Z plasty (double transposition flap) was deemed most appropriate.  Using a sterile surgical marker, the appropriate transposition flaps were drawn incorporating the defect and placing the expected incisions within the relaxed skin tension lines where possible. The area thus outlined was incised deep to adipose tissue with a #15 scalpel blade.  The skin margins were undermined to an appropriate distance in all directions utilizing iris scissors.  Hemostasis was achieved with electrocautery.  The flaps were then transposed into place, one clockwise and the other counterclockwise, and anchored with interrupted buried subcutaneous sutures.
Double Z Plasty Text: The lesion was extirpated to the level of the fat with a #15 scalpel blade. Given the location of the defect, shape of the defect and the proximity to free margins a double Z-plasty was deemed most appropriate for repair. Using a sterile surgical marker, the appropriate transposition arms of the double Z-plasty were drawn incorporating the defect and placing the expected incisions within the relaxed skin tension lines where possible. The area thus outlined was incised deep to adipose tissue with a #15 scalpel blade. The skin margins were undermined to an appropriate distance in all directions utilizing iris scissors. The opposing transposition arms were then transposed and carried over into place in opposite direction and anchored with interrupted buried subcutaneous sutures.
Ear Star Wedge Flap Text: The defect edges were debeveled with a #15 blade scalpel.  Given the location of the defect and the proximity to free margins (helical rim) an ear star wedge flap was deemed most appropriate.  Using a sterile surgical marker, the appropriate flap was drawn incorporating the defect and placing the expected incisions between the helical rim and antihelix where possible.  The area thus outlined was incised through and through with a #15 scalpel blade.
Flip-Flop Flap Text: The defect edges were debeveled with a #15 blade scalpel.  Given the location of the defect and the proximity to free margins a flip-flop flap was deemed most appropriate. Using a sterile surgical marker, the appropriate flap was drawn incorporating the defect and placing the expected incisions between the helical rim and antihelix where possible.  The area thus outlined was incised through and through with a #15 scalpel blade. Following this, the designed flap was carried over into the primary defect and sutured into place.
Hatchet Flap Text: The defect edges were debeveled with a #15 scalpel blade.  Given the location of the defect, shape of the defect and the proximity to free margins a hatchet flap was deemed most appropriate.  Using a sterile surgical marker, an appropriate hatchet flap was drawn incorporating the defect and placing the expected incisions within the relaxed skin tension lines where possible.    The area thus outlined was incised deep to adipose tissue with a #15 scalpel blade.  The skin margins were undermined to an appropriate distance in all directions utilizing iris scissors.
Helical Rim Advancement Flap Text: Given the location of the defect, inherent tension at the surgical site, and the proximity to free margins a helical rim advancement flap was deemed most appropriate for wound reconstruction. The risks, benefits, and possible outcomes of this procedure were discussed along with the risks, benefits, and possible outcomes of other options for wound repair (including but not limited to: complex closure, other flaps, grafts, and second intention). The patient verbalized understanding and consent to the outlined procedure. The patient verbalized understanding that intraoperative conditions may necessitate a change in the outlined procedure resulting in modification of the original surgical plan. This decision may be made at the discretion of the surgeon, and is due to factors subject to change or that are difficult to predict preoperatively. Using a sterile surgical marker, an appropriate helical rim advancement flap was drawn incorporating the defect and placing the expected incisions within the relaxed skin tension lines where possible. The surgical site and surrounding skin were prepped and draped in sterile fashion. Anesthesia was checked and supplemented as necessary. A single standing cone was then excised posterior to the defect. A linear arc-like incision was removed at the anterior aspect of the defect and carried to a point of relative auricular skin laxity. A second dog ear was then removed at the end of this incision. The flap was undermined widely and bilaterally in the appropriate surgical plane. Hemostasis was obtained and then the flap was sutured together in layered fashion.
H Plasty Text: Given the location of the defect, shape of the defect and the proximity to free margins a H-plasty was deemed most appropriate for repair.  Using a sterile surgical marker, the appropriate advancement arms of the H-plasty were drawn incorporating the defect and placing the expected incisions within the relaxed skin tension lines where possible. The area thus outlined was incised deep to adipose tissue with a #15 scalpel blade. The skin margins were undermined to an appropriate distance in all directions utilizing iris scissors.  The opposing advancement arms were then advanced into place in opposite direction and anchored with interrupted buried subcutaneous sutures.
Island Pedicle Flap Text: Given the location of the defect, inherent tension at the surgical site, and the proximity to free margins an island pedicle advancement flap was deemed most appropriate for wound reconstruction. The risks, benefits, and possible outcomes of this procedure were discussed along with the risks, benefits, and possible outcomes of other options for wound repair (including but not limited to: complex closure, other flaps, grafts, and second intention). The patient verbalized understanding and consent to the outlined procedure. The patient verbalized understanding that intraoperative conditions may necessitate a change in the outlined procedure resulting in modification of the original surgical plan. This decision may be made at the discretion of the surgeon, and is due to factors subject to change or that are difficult to predict preoperatively. \\n\\nUsing a sterile surgical marker, an appropriate island pedicle advancement flap was drawn incorporating the defect and placing the expected incisions within the relaxed skin tension lines where possible. The surgical site and surrounding skin were prepped and draped in sterile fashion.  The island pedicle advancement flap was incised and undermined within the appropriate surgical plane, creating a mobile flap supported by a central vascular pedicle.  The wound edges surrounding the flap and primary defect were undermined widely and bilaterally in the appropriate surgical plane taking care to preserve local arteries, veins, nerves, and other structures of anatomic importance. This created a sliding flap capable of advancing across the defect to close the wound under minimal tension. Hemostasis was achieved and then the wound was sutured in layered fashion.
Island Pedicle Flap With Canthal Suspension Text: The defect edges were debeveled with a #15 scalpel blade.  Given the location of the defect, shape of the defect and the proximity to free margins an island pedicle advancement flap was deemed most appropriate.  Using a sterile surgical marker, an appropriate advancement flap was drawn incorporating the defect, outlining the appropriate donor tissue and placing the expected incisions within the relaxed skin tension lines where possible. The area thus outlined was incised deep to adipose tissue with a #15 scalpel blade.  The skin margins were undermined to an appropriate distance in all directions around the primary defect and laterally outward around the island pedicle utilizing iris scissors.  There was minimal undermining beneath the pedicle flap. A suspension suture was placed in the canthal tendon to prevent tension and prevent ectropion.
Island Pedicle Flap-Requiring Vessel Identification Text: The defect edges were debeveled with a #15 scalpel blade.  Given the location of the defect, shape of the defect and the proximity to free margins an island pedicle advancement flap was deemed most appropriate.  Using a sterile surgical marker, an appropriate advancement flap was drawn, based on the axial vessel mentioned above, incorporating the defect, outlining the appropriate donor tissue and placing the expected incisions within the relaxed skin tension lines where possible.    The area thus outlined was incised deep to adipose tissue with a #15 scalpel blade.  The skin margins were undermined to an appropriate distance in all directions around the primary defect and laterally outward around the island pedicle utilizing iris scissors.  There was minimal undermining beneath the pedicle flap.
Keystone Flap Text: The defect edges were debeveled with a #15 scalpel blade.  Given the location of the defect, shape of the defect a keystone flap was deemed most appropriate.  Using a sterile surgical marker, an appropriate keystone flap was drawn incorporating the defect, outlining the appropriate donor tissue and placing the expected incisions within the relaxed skin tension lines where possible. The area thus outlined was incised deep to adipose tissue with a #15 scalpel blade.  The skin margins were undermined to an appropriate distance in all directions around the primary defect and laterally outward around the flap utilizing iris scissors.
Melolabial Transposition Flap Text: Given the size/depth/location of the defect, inherent tension at the surgical site, and the proximity to free margins, a one stage nasolabial transposition flap was deemed most appropriate for wound reconstruction. The risks, benefits, and possible outcomes of this procedure were discussed along with the risks, benefits, and possible outcomes of other options for wound repair (including but not limited to: complex closure, other flaps, grafts, second intention, and combinations of these procedures). The patient verbalized understanding and consent to the outlined procedure. The patient verbalized understanding that intraoperative conditions may necessitate a change in the outlined procedure resulting in modification of the original surgical plan. This decision may be made at the discretion of the surgeon, and is due to factors subject to change or that are difficult to predict preoperatively. We also discussed complex defects involving multiple cosmetic subunits often necessitate several smaller planned surgical revisions spread out over the course of many months in order to restore appropriate tissue volume, cosmetic landmarks, and diminish scarring. In many cases anatomic “semblance” is possible, but complete parity is not. The patient understands that in some cases referral to additional specialists for additional procedures may be required. \\nUsing a sterile surgical marker, an appropriate nasolabial transposition flap was drawn incorporating the defect and placing the expected incisions within the relaxed skin tension lines where possible. The surgical site and surrounding skin were prepped and draped in sterile fashion. Anesthesia was checked and supplemented as necessary.  A single standing cone was removed along one border of the surgical defect. The flap and second standing cone were incised and raised from the opposite pole as the standing cone in the appropriate surgical plane. The entire flap and area surrounding the removed standing cones was undermined widely and bilaterally in the appropriate surgical plane taking care to preserve local arteries, veins, nerves, and other structures of anatomic importance. The area along the distal nasal portion of the post Mohs defect was undermined just enough to place deep buried vertical mattress sutures. This created a flap capable of transposing into the defect to drape the wound under minimal tension. Hemostasis was achieved. A single anchoring buried vertical mattress suture was placed to advance/transpose the flap into position without distortion of the surrounding free margins. The remaining flap was sutured in layered fashion.
Mercedes Flap Text: Given the location of the defect, inherent tension at the surgical site, and the proximity to free margins a Mercedes (Tripolar) Advancement Flap was deemed most appropriate for wound reconstruction. The risks, benefits, and possible outcomes of this procedure were discussed along with the risks, benefits, and possible outcomes of other options for wound repair (including but not limited to: complex closure, other flaps, grafts, and second intention). The patient verbalized understanding and consent to the outlined procedure. The patient verbalized understanding that intraoperative conditions may necessitate a change in the outlined procedure resulting in modification of the original surgical plan. This decision may be made at the discretion of the surgeon, and is due to factors subject to change or that are difficult to predict preoperatively. Using a sterile surgical marker, an appropriate tripolar advancement flap was drawn incorporating the defect and placing the expected incisions within the relaxed skin tension lines where possible. The surgical site and surrounding skin were prepped and draped in sterile fashion.  Anesthesia was checked and supplemented as necessary. Standing cones were removed from three “poles” of the defect within the appropriate surgical plane, repositioning as necessary based upon local tension, proximity to free margins/other anatomic structures, and position of the relaxed skin tension lines. The resulting defect was undermined widely and bilaterally in the appropriate surgical plane taking care to preserve local arteries, veins, nerves, and other structures of anatomic importance. This created a sliding flap capable of advancing across the defect from three reservoirs to close the wound under minimal tension. Hemostasis was achieved and then the wound was sutured in layered fashion.
Modified Advancement Flap Text: Given the location of the defect, inherent tension at the surgical site, and the proximity to free margins a modified advancement flap was deemed most appropriate for wound reconstruction. The risks, benefits, and possible outcomes of this procedure were discussed along with the risks, benefits, and possible outcomes of other options for wound repair (including but not limited to: complex closure, other flaps, grafts, and second intention). The patient verbalized understanding and consent to the outlined procedure. The patient verbalized understanding that intraoperative conditions may necessitate a change in the outlined procedure resulting in modification of the original surgical plan. This decision may be made at the discretion of the surgeon, and is due to factors subject to change or that are difficult to predict preoperatively. Using a sterile surgical marker, an appropriate advancement flap was drawn incorporating the defect and placing the expected incisions within the relaxed skin tension lines where possible.\\n\\nThe surgical site and surrounding skin were prepped and draped in sterile fashion.  Standing cones were removed from opposite ends of the defect within the appropriate surgical plane, repositioning as necessary based upon local tension, proximity to free margins/other anatomic structures, and position of the relaxed skin tension lines. The resulting defect was undermined widely and bilaterally in the appropriate surgical plane taking care to preserve local arteries, veins, nerves, and other structures of anatomic importance. This created a sliding flap capable of advancing across the defect to close the wound under minimal tension. Hemostasis was achieved and then the wound was sutured in layered fashion.
Mucosal Advancement Flap Text: Given the location of the defect, shape of the defect and the proximity to free margins a mucosal advancement flap was deemed most appropriate. Incisions were made with a 15 blade scalpel in the appropriate fashion along the cutaneous vermilion border and the mucosal lip. The remaining actinically damaged mucosal tissue was excised.  The mucosal advancement flap was then elevated to the gingival sulcus with care taken to preserve the neurovascular structures and advanced into the primary defect. Care was taken to ensure that precise realignment of the vermilion border was achieved.
Muscle Hinge Flap Text: The defect edges were debeveled with a #15 scalpel blade.  Given the size, depth and location of the defect and the proximity to free margins a muscle hinge flap was deemed most appropriate.  Using a sterile surgical marker, an appropriate hinge flap was drawn incorporating the defect. The area thus outlined was incised with a #15 scalpel blade.  The skin margins were undermined to an appropriate distance in all directions utilizing iris scissors.
Mustarde Flap Text: The defect edges were debeveled with a #15 scalpel blade.  Given the size, depth and location of the defect and the proximity to free margins a Mustarde flap was deemed most appropriate.  Using a sterile surgical marker, an appropriate flap was drawn incorporating the defect. The area thus outlined was incised with a #15 scalpel blade.  The skin margins were undermined to an appropriate distance in all directions utilizing iris scissors.
Nasal Turnover Hinge Flap Text: The defect edges were debeveled with a #15 scalpel blade.  Given the size, depth, location of the defect and the defect being full thickness a nasal turnover hinge flap was deemed most appropriate.  Using a sterile surgical marker, an appropriate hinge flap was drawn incorporating the defect. The area thus outlined was incised with a #15 scalpel blade. The flap was designed to recreate the nasal mucosal lining and the alar rim. The skin margins were undermined to an appropriate distance in all directions utilizing iris scissors.
Nasalis-Muscle-Based Myocutaneous Island Pedicle Flap Text: Using a #15 blade, an incision was made around the donor flap to the level of the nasalis muscle. Wide lateral undermining was then performed in both the subcutaneous plane above the nasalis muscle, and in a submuscular plane just above periosteum. This allowed the formation of a free nasalis muscle axial pedicle (based on the angular artery) which was still attached to the actual cutaneous flap, increasing its mobility and vascular viability. Hemostasis was obtained with pinpoint electrocoagulation. The flap was mobilized into position and the pivotal anchor points positioned and stabilized with buried interrupted sutures. Subcutaneous and dermal tissues were closed in a multilayered fashion with sutures. Tissue redundancies were excised, and the epidermal edges were apposed without significant tension and sutured with sutures.
Nasalis Myocutaneous Flap Text: Using a #15 blade, an incision was made around the donor flap to the level of the nasalis muscle. Wide lateral undermining was then performed in both the subcutaneous plane above the nasalis muscle, and in a submuscular plane just above periosteum. This allowed the formation of a free nasalis muscle axial pedicle which was still attached to the actual cutaneous flap, increasing its mobility and vascular viability. Hemostasis was obtained with pinpoint electrocoagulation. The flap was mobilized into position and the pivotal anchor points positioned and stabilized with buried interrupted sutures. Subcutaneous and dermal tissues were closed in a multilayered fashion with sutures. Tissue redundancies were excised, and the epidermal edges were apposed without significant tension and sutured with sutures.
Nasolabial Transposition Flap Text: The defect edges were debeveled with a #15 scalpel blade.  Given the size, depth and location of the defect and the proximity to free margins a nasolabial transposition flap was deemed most appropriate. Using a sterile surgical marker, an appropriate flap was drawn incorporating the defect. The area thus outlined was incised with a #15 scalpel blade. The skin margins were undermined to an appropriate distance in all directions utilizing iris scissors. Following this, the designed flap was carried into the primary defect and sutured into place.
Orbicularis Oris Muscle Flap Text: The defect edges were debeveled with a #15 scalpel blade.  Given that the defect affected the competency of the oral sphincter an orbicularis oris muscle flap was deemed most appropriate to restore this competency and normal muscle function.  Using a sterile surgical marker, an appropriate flap was drawn incorporating the defect. The area thus outlined was incised with a #15 scalpel blade.
O-T Advancement Flap Text: The defect edges were debeveled with a #15 scalpel blade.  Given the location of the defect, shape of the defect and the proximity to free margins an O-T advancement flap was deemed most appropriate.  Using a sterile surgical marker, an appropriate advancement flap was drawn incorporating the defect and placing the expected incisions within the relaxed skin tension lines where possible.    The area thus outlined was incised deep to adipose tissue with a #15 scalpel blade.  The skin margins were undermined to an appropriate distance in all directions utilizing iris scissors.
O-T Plasty Text: The defect edges were debeveled with a #15 scalpel blade.  Given the location of the defect, shape of the defect and the proximity to free margins an O-T plasty was deemed most appropriate.  Using a sterile surgical marker, an appropriate O-T plasty was drawn incorporating the defect and placing the expected incisions within the relaxed skin tension lines where possible.    The area thus outlined was incised deep to adipose tissue with a #15 scalpel blade.  The skin margins were undermined to an appropriate distance in all directions utilizing iris scissors.
O-L Flap Text: The defect edges were debeveled with a #15 scalpel blade.  Given the location of the defect, shape of the defect and the proximity to free margins an O-L flap was deemed most appropriate.  Using a sterile surgical marker, an appropriate advancement flap was drawn incorporating the defect and placing the expected incisions within the relaxed skin tension lines where possible.    The area thus outlined was incised deep to adipose tissue with a #15 scalpel blade.  The skin margins were undermined to an appropriate distance in all directions utilizing iris scissors.
O-Z Flap Text: The defect edges were debeveled with a #15 scalpel blade.  Given the location of the defect, shape of the defect and the proximity to free margins an O-Z flap was deemed most appropriate.  Using a sterile surgical marker, an appropriate transposition flap was drawn incorporating the defect and placing the expected incisions within the relaxed skin tension lines where possible. The area thus outlined was incised deep to adipose tissue with a #15 scalpel blade.  The skin margins were undermined to an appropriate distance in all directions utilizing iris scissors.
O-Z Plasty Text: The defect edges were debeveled with a #15 scalpel blade.  Given the location of the defect, shape of the defect and the proximity to free margins an O-Z plasty (double transposition flap) was deemed most appropriate.  Using a sterile surgical marker, the appropriate transposition flaps were drawn incorporating the defect and placing the expected incisions within the relaxed skin tension lines where possible.    The area thus outlined was incised deep to adipose tissue with a #15 scalpel blade.  The skin margins were undermined to an appropriate distance in all directions utilizing iris scissors.  Hemostasis was achieved with electrocautery.  The flaps were then transposed into place, one clockwise and the other counterclockwise, and anchored with interrupted buried subcutaneous sutures.
Peng Advancement Flap Text: The defect edges were debeveled with a #15 scalpel blade.  Given the location of the defect, shape of the defect and the proximity to free margins a Peng advancement flap was deemed most appropriate.  Using a sterile surgical marker, an appropriate advancement flap was drawn incorporating the defect and placing the expected incisions within the relaxed skin tension lines where possible. The area thus outlined was incised deep to adipose tissue with a #15 scalpel blade.  The skin margins were undermined to an appropriate distance in all directions utilizing iris scissors.
Rectangular Flap Text: The defect edges were debeveled with a #15 scalpel blade. Given the location of the defect and the proximity to free margins a rectangular flap was deemed most appropriate. Using a sterile surgical marker, an appropriate rectangular flap was drawn incorporating the defect. The area thus outlined was incised deep to adipose tissue with a #15 scalpel blade. The skin margins were undermined to an appropriate distance in all directions utilizing iris scissors. Following this, the designed flap was carried over into the primary defect and sutured into place.
Rhombic Flap Text: Given the location of the defect, inherent tension at the surgical site, and the proximity to free margins a rhombic transposition flap was deemed most appropriate for wound reconstruction. The risks, benefits, and possible outcomes of this procedure were discussed along with the risks, benefits, and possible outcomes of other options for wound repair (including but not limited to: complex closure, other flaps, grafts, and second intention). The patient verbalized understanding and consent to the outlined procedure. The patient verbalized understanding that intraoperative conditions may necessitate a change in the outlined procedure resulting in modification of the original surgical plan. This decision may be made at the discretion of the surgeon, and is due to factors subject to change or that are difficult to predict preoperatively. Using a sterile surgical marker, an appropriate rhombic transposition flap was drawn incorporating the defect and placing the expected incisions within the relaxed skin tension lines where possible. The surgical site and surrounding skin were prepped and draped in sterile fashion.  A single standing cone was removed at the expected pivot point of the transposition flap in the appropriate surgical plane, repositioning as necessary based upon local tension, proximity to free margins/other anatomic structures, and position of the relaxed skin tension lines. The flap (designed approximately 90 degrees from one surgical axis) was incised and raised in the appropriate surgical plane. The resulting defect was undermined widely and bilaterally in the appropriate surgical plane taking care to preserve local arteries, veins, nerves, and other structures of anatomic importance. This created a flap capable of transposing across the defect to close the wound under minimal tension, without distortion of adjacent free margins. Hemostasis was achieved and then the wound was sutured in layered fashion.
Rotation Flap Text: Given the location of the defect, inherent tension at the surgical site, and the proximity to free margins a rotation flap was deemed most appropriate for wound reconstruction. The risks, benefits, and possible outcomes of this procedure were discussed along with the risks, benefits, and possible outcomes of other options for wound repair (including but not limited to: complex closure, other flaps, grafts, and second intention). The patient verbalized understanding and consent to the outlined procedure. The patient verbalized understanding that intraoperative conditions may necessitate a change in the outlined procedure resulting in modification of the original surgical plan. This decision may be made at the discretion of the surgeon, and is due to factors subject to change or that are difficult to predict preoperatively. Using a sterile surgical marker, an appropriate rotation flap was drawn incorporating the defect and placing the expected incisions within the relaxed skin tension lines where possible. The area thus outlined was incised deep to adipose tissue with a #15 scalpel blade. The resulting defect was undermined widely and bilaterally in the appropriate surgical plane taking care to preserve local arteries, veins, nerves, and other structures of anatomic importance. This created a flap capable of rotating across the defect to close the wound under minimal tension. Hemostasis was obtained and then the flap was sutured together in layered fashion.
Spiral Flap Text: The defect edges were debeveled with a #15 scalpel blade.  Given the location of the defect, shape of the defect and the proximity to free margins a spiral flap was deemed most appropriate.  Using a sterile surgical marker, an appropriate rotation flap was drawn incorporating the defect and placing the expected incisions within the relaxed skin tension lines where possible. The area thus outlined was incised deep to adipose tissue with a #15 scalpel blade.  The skin margins were undermined to an appropriate distance in all directions utilizing iris scissors.
Staged Advancement Flap Text: The defect edges were debeveled with a #15 scalpel blade.  Given the location of the defect, shape of the defect and the proximity to free margins a staged advancement flap was deemed most appropriate.  Using a sterile surgical marker, an appropriate advancement flap was drawn incorporating the defect and placing the expected incisions within the relaxed skin tension lines where possible. The area thus outlined was incised deep to adipose tissue with a #15 scalpel blade.  The skin margins were undermined to an appropriate distance in all directions utilizing iris scissors.
Star Wedge Flap Text: The defect edges were debeveled with a #15 scalpel blade.  Given the location of the defect, shape of the defect and the proximity to free margins a star wedge flap was deemed most appropriate.  Using a sterile surgical marker, an appropriate rotation flap was drawn incorporating the defect and placing the expected incisions within the relaxed skin tension lines where possible. The area thus outlined was incised deep to adipose tissue with a #15 scalpel blade.  The skin margins were undermined to an appropriate distance in all directions utilizing iris scissors.
Transposition Flap Text: The defect edges were debeveled with a #15 scalpel blade.  Given the location of the defect and the proximity to free margins a transposition flap was deemed most appropriate.  Using a sterile surgical marker, an appropriate transposition flap was drawn incorporating the defect.    The area thus outlined was incised deep to adipose tissue with a #15 scalpel blade.  The skin margins were undermined to an appropriate distance in all directions utilizing iris scissors.
Trilobed Flap Text: Given the location of the defect, inherent tension at the surgical site, and the proximity to free margins a trilobed transposition flap was deemed most appropriate for wound reconstruction. The risks, benefits, and possible outcomes of this procedure were discussed along with the risks, benefits, and possible outcomes of other options for wound repair (including but not limited to: complex closure, other flaps, grafts, and second intention). The patient verbalized understanding and consent to the outlined procedure. The patient verbalized understanding that intraoperative conditions may necessitate a change in the outlined procedure resulting in modification of the original surgical plan. This decision may be made at the discretion of the surgeon, and is due to factors subject to change or that are difficult to predict preoperatively. We also discussed that should the patient heal with a scar they find noticeable/unfavorable additional surgical procedures and/or referral to additional specialists may be required. The patient understands the healing process takes place over many months and that the \"final\" appearance of the scar line often takes a year or longer to achieve.\\nUsing a sterile surgical marker, an appropriate trilobe transposition flap was drawn incorporating the defect and placing the expected incisions within the relaxed skin tension lines where possible. The surgical site and surrounding skin were prepped and draped in sterile fashion.  A single standing cone was removed at the expected pivot point of the transposition flap in the appropriate surgical plane, repositioning as necessary based upon local tension, proximity to free margins/other anatomic structures, and position of the relaxed skin tension lines. The flap (containing three lobes  by 45 degrees, each proportionate in size to the postop defect) was incised and raised in the appropriate surgical plane. The resulting defect was undermined widely and bilaterally in the appropriate surgical plane taking care to preserve local arteries, veins, nerves, and other structures of anatomic importance. This created a flap capable of transposing across the defect to close the wound under minimal tension, without distortion of adjacent free margins. Hemostasis was achieved and then the wound was sutured in layered fashion.
V-Y Plasty Text: The defect edges were debeveled with a #15 scalpel blade.  Given the location of the defect, shape of the defect and the proximity to free margins an V-Y advancement flap was deemed most appropriate.  Using a sterile surgical marker, an appropriate advancement flap was drawn incorporating the defect and placing the expected incisions within the relaxed skin tension lines where possible.    The area thus outlined was incised deep to adipose tissue with a #15 scalpel blade.  The skin margins were undermined to an appropriate distance in all directions utilizing iris scissors.
W Plasty Text: The lesion was extirpated to the level of the fat with a #15 scalpel blade.  Given the location of the defect, shape of the defect and the proximity to free margins a W-plasty was deemed most appropriate for repair.  Using a sterile surgical marker, the appropriate transposition arms of the W-plasty were drawn incorporating the defect and placing the expected incisions within the relaxed skin tension lines where possible.    The area thus outlined was incised deep to adipose tissue with a #15 scalpel blade.  The skin margins were undermined to an appropriate distance in all directions utilizing iris scissors.  The opposing transposition arms were then transposed into place in opposite direction and anchored with interrupted buried subcutaneous sutures.
Z Plasty Text: The lesion was extirpated to the level of the fat with a #15 scalpel blade.  Given the location of the defect, shape of the defect and the proximity to free margins a Z-plasty was deemed most appropriate for repair.  Using a sterile surgical marker, the appropriate transposition arms of the Z-plasty were drawn incorporating the defect and placing the expected incisions within the relaxed skin tension lines where possible.    The area thus outlined was incised deep to adipose tissue with a #15 scalpel blade.  The skin margins were undermined to an appropriate distance in all directions utilizing iris scissors.  The opposing transposition arms were then transposed into place in opposite direction and anchored with interrupted buried subcutaneous sutures.
Zygomaticofacial Flap Text: Given the location of the defect, shape of the defect and the proximity to free margins a zygomaticofacial flap was deemed most appropriate for repair.  Using a sterile surgical marker, the appropriate flap was drawn incorporating the defect and placing the expected incisions within the relaxed skin tension lines where possible. The area thus outlined was incised deep to adipose tissue with a #15 scalpel blade with preservation of a vascular pedicle.  The skin margins were undermined to an appropriate distance in all directions utilizing iris scissors.  The flap was then placed into the defect and anchored with interrupted buried subcutaneous sutures.
Abbe Flap (Lower To Upper Lip) Text: The defect of the upper lip was assessed and measured.  Given the location and size of the defect, an Abbe flap was deemed most appropriate.  Using a sterile surgical marker, an appropriate Abbe flap was measured and drawn on the lower lip. Local anesthesia was then infiltrated. A scalpel was then used to incise the upper lip through and through the skin, vermilion, muscle and mucosa, leaving the flap pedicled on the opposite side.  The flap was then rotated and transferred to the lower lip defect.  The flap was then sutured into place with a three layer technique, closing the orbicularis oris muscle layer with subcutaneous buried sutures, followed by a mucosal layer and an epidermal layer.
Abbe Flap (Upper To Lower Lip) Text: The defect of the lower lip was assessed and measured.  Given the location and size of the defect, an Abbe flap was deemed most appropriate.  Using a sterile surgical marker, an appropriate Abbe flap was measured and drawn on the upper lip. Local anesthesia was then infiltrated.  A scalpel was then used to incise the upper lip through and through the skin, vermilion, muscle and mucosa, leaving the flap pedicled on the opposite side.  The flap was then rotated and transferred to the lower lip defect.  The flap was then sutured into place with a three layer technique, closing the orbicularis oris muscle layer with subcutaneous buried sutures, followed by a mucosal layer and an epidermal layer.
Cheek Interpolation Flap Text: A decision was made to reconstruct the defect utilizing an interpolation axial flap and a staged reconstruction.  A telfa template was made of the defect.  This telfa template was then used to outline the Cheek Interpolation flap.  The donor area for the pedicle flap was then injected with anesthesia.  The flap was excised through the skin and subcutaneous tissue down to the layer of the underlying musculature.  The interpolation flap was carefully excised within this deep plane to maintain its blood supply.  The edges of the donor site were undermined.   The donor site was closed in a primary fashion.  The pedicle was then rotated into position and sutured.  Once the tube was sutured into place, adequate blood supply was confirmed with blanching and refill.  The pedicle was then wrapped with xeroform gauze and dressed appropriately with a telfa and gauze bandage to ensure continued blood supply and protect the attached pedicle.
Cheek-To-Nose Interpolation Flap Text: A decision was made to reconstruct the defect utilizing an interpolation axial flap and a staged reconstruction.  A telfa template was made of the defect.  This telfa template was then used to outline the Cheek-To-Nose Interpolation flap.  The donor area for the pedicle flap was then injected with anesthesia.  The flap was excised through the skin and subcutaneous tissue down to the layer of the underlying musculature.  The interpolation flap was carefully excised within this deep plane to maintain its blood supply.  The edges of the donor site were undermined.   The donor site was closed in a primary fashion.  The pedicle was then rotated into position and sutured.  Once the tube was sutured into place, adequate blood supply was confirmed with blanching and refill.  The pedicle was then wrapped with xeroform gauze and dressed appropriately with a telfa and gauze bandage to ensure continued blood supply and protect the attached pedicle.
Estlander Flap (Lower To Upper Lip) Text: The defect of the lower lip was assessed and measured.  Given the location and size of the defect, an Estlander flap was deemed most appropriate.  Using a sterile surgical marker, an appropriate Estlander flap was measured and drawn on the upper lip. Local anesthesia was then infiltrated. A scalpel was then used to incise the lateral aspect of the flap, through skin, muscle and mucosa, leaving the flap pedicled medially.  The flap was then rotated and positioned to fill the lower lip defect.  The flap was then sutured into place with a three layer technique, closing the orbicularis oris muscle layer with subcutaneous buried sutures, followed by a mucosal layer and an epidermal layer.
Interpolation Flap Text: A decision was made to reconstruct the defect utilizing an interpolation axial flap and a staged reconstruction.  A telfa template was made of the defect.  This telfa template was then used to outline the interpolation flap.  The donor area for the pedicle flap was then injected with anesthesia.  The flap was excised through the skin and subcutaneous tissue down to the layer of the underlying musculature.  The interpolation flap was carefully excised within this deep plane to maintain its blood supply.  The edges of the donor site were undermined.   The donor site was closed in a primary fashion.  The pedicle was then rotated into position and sutured.  Once the tube was sutured into place, adequate blood supply was confirmed with blanching and refill.  The pedicle was then wrapped with xeroform gauze and dressed appropriately with a telfa and gauze bandage to ensure continued blood supply and protect the attached pedicle.
Melolabial Interpolation Flap Text: A decision was made to reconstruct the defect utilizing an interpolation axial flap and a staged reconstruction.  A telfa template was made of the defect.  This telfa template was then used to outline the melolabial interpolation flap.  The donor area for the pedicle flap was then injected with anesthesia.  The flap was excised through the skin and subcutaneous tissue down to the layer of the underlying musculature.  The pedicle flap was carefully excised within this deep plane to maintain its blood supply.  The edges of the donor site were undermined.   The donor site was closed in a primary fashion.  The pedicle was then rotated into position and sutured.  Once the tube was sutured into place, adequate blood supply was confirmed with blanching and refill.  The pedicle was then wrapped with xeroform gauze and dressed appropriately with a telfa and gauze bandage to ensure continued blood supply and protect the attached pedicle.
Mastoid Interpolation Flap Text: A decision was made to reconstruct the defect utilizing an interpolation axial flap and a staged reconstruction.  A telfa template was made of the defect.  This telfa template was then used to outline the mastoid interpolation flap.  The donor area for the pedicle flap was then injected with anesthesia.  The flap was excised through the skin and subcutaneous tissue down to the layer of the underlying musculature.  The pedicle flap was carefully excised within this deep plane to maintain its blood supply.  The edges of the donor site were undermined.   The donor site was closed in a primary fashion.  The pedicle was then rotated into position and sutured.  Once the tube was sutured into place, adequate blood supply was confirmed with blanching and refill.  The pedicle was then wrapped with xeroform gauze and dressed appropriately with a telfa and gauze bandage to ensure continued blood supply and protect the attached pedicle.
Paramedian Forehead Flap Text: A decision was made to reconstruct the defect utilizing an interpolation axial flap and a staged reconstruction.  A telfa template was made of the defect.  This telfa template was then used to outline the paramedian forehead pedicle flap.  The donor area for the pedicle flap was then injected with anesthesia.  The flap was excised through the skin and subcutaneous tissue down to the layer of the underlying musculature.  The pedicle flap was carefully excised within this deep plane to maintain its blood supply.  The edges of the donor site were undermined.   The donor site was closed in a primary fashion.  The pedicle was then rotated into position and sutured.  Once the tube was sutured into place, adequate blood supply was confirmed with blanching and refill.  The pedicle was then wrapped with xeroform gauze and dressed appropriately with a telfa and gauze bandage to ensure continued blood supply and protect the attached pedicle.
Posterior Auricular Interpolation Flap Text: A decision was made to reconstruct the defect utilizing an interpolation axial flap and a staged reconstruction.  A telfa template was made of the defect.  This telfa template was then used to outline the posterior auricular interpolation flap.  The donor area for the pedicle flap was then injected with anesthesia.  The flap was excised through the skin and subcutaneous tissue down to the layer of the underlying musculature.  The pedicle flap was carefully excised within this deep plane to maintain its blood supply.  The edges of the donor site were undermined.   The donor site was closed in a primary fashion.  The pedicle was then rotated into position and sutured.  Once the tube was sutured into place, adequate blood supply was confirmed with blanching and refill.  The pedicle was then wrapped with xeroform gauze and dressed appropriately with a telfa and gauze bandage to ensure continued blood supply and protect the attached pedicle.
Cheiloplasty (Complex) Text: A decision was made to reconstruct the defect with a  cheiloplasty.  The defect was undermined extensively.  Additional orbicularis oris muscle was excised with a 15 blade scalpel.  The defect was converted into a full thickness wedge to facilite a better cosmetic result.  Small vessels were then tied off with 5-0 monocyrl. The orbicularis oris, superficial fascia, adipose and dermis were then reapproximated.  After the deeper layers were approximated the epidermis was reapproximated with particular care given to realign the vermilion border.
Cheiloplasty (Less Than 50%) Text: A decision was made to reconstruct the defect with a  cheiloplasty.  The defect was undermined extensively.  Additional orbicularis oris muscle was excised with a 15 blade scalpel.  The defect was converted into a full thickness wedge, of less than 50% of the vertical height of the lip, to facilite a better cosmetic result.  Small vessels were then tied off with 5-0 monocyrl. The orbicularis oris, superficial fascia, adipose and dermis were then reapproximated.  After the deeper layers were approximated the epidermis was reapproximated with particular care given to realign the vermilion border.
Ear Wedge Repair Text: A wedge excision was completed by carrying down an excision through the full thickness of the ear and cartilage with an inward facing Burow's triangle. The wound was then closed in a layered fashion.
Full Thickness Lip Wedge Repair (Flap) Text: Given the location of the defect and the proximity to free margins a full thickness wedge repair was deemed most appropriate.  Using a sterile surgical marker, the appropriate repair was drawn incorporating the defect and placing the expected incisions perpendicular to the vermilion border.  The vermilion border was also meticulously outlined to ensure appropriate reapproximation during the repair.  The area thus outlined was incised through and through with a #15 scalpel blade.  The muscularis and dermis were reaproximated with deep sutures following hemostasis. Care was taken to realign the vermilion border before proceeding with the superficial closure.  Once the vermilion was realigned the superfical and mucosal closure was finished.
Burow's Graft Text: Given the location of the defect, shape of the defect, the proximity to free margins and the presence of a standing cone deformity a Burow's skin graft was deemed most appropriate. The risks, benefits, and possible outcomes of this procedure were discussed along with the risks, benefits, and possible outcomes of other options for wound repair (including but not limited to: variations of complex closure, flaps, other types of grafts, and second intention). The patient verbalized understanding and consent to the outlined procedure. The patient understands should the surgical site heal in a manner they find unsatisfactory further interventions or referral to an additional specialist may be required. The graft donor and recipient site were prepped and draped in sterile fashion. Anesthesia was checked and supplemented as necessary. The standing cone was removed and this tissue was then trimmed to the shape of the primary defect. The adipose tissue was also removed until only dermis and epidermis were left.  The skin margins of the secondary defect were undermined to an appropriate distance in all directions utilizing iris scissors.  The secondary defect was closed with interrupted buried subcutaneous sutures.  The skin graft was then placed in the primary defect and oriented appropriately. The graft was sutured into place with simple interrupted and simple running cutaneous sutures.
Cartilage Graft Text: The defect edges were debeveled with a #15 scalpel blade.  Given the location of the defect, shape of the defect, the fact the defect involved a full thickness cartilage defect a cartilage graft was deemed most appropriate.  An appropriate donor site was identified, cleansed, and anesthetized. The cartilage graft was then harvested and transferred to the recipient site, oriented appropriately and then sutured into place.  The secondary defect was then repaired using a primary closure.
Composite Graft Text: The defect edges were debeveled with a #15 scalpel blade.  Given the location of the defect, shape of the defect, the proximity to free margins and the fact the defect was full thickness a composite graft was deemed most appropriate.  The defect was outline and then transferred to the donor site.  A full thickness graft was then excised from the donor site. The graft was then placed in the primary defect, oriented appropriately and then sutured into place.  The secondary defect was then repaired using a primary closure.
Epidermal Autograft Text: The defect edges were debeveled with a #15 scalpel blade.  Given the location of the defect, shape of the defect and the proximity to free margins an epidermal autograft was deemed most appropriate.  Using a sterile surgical marker, the primary defect shape was transferred to the donor site. The epidermal graft was then harvested.  The skin graft was then placed in the primary defect and oriented appropriately.
Dermal Autograft Text: The defect edges were debeveled with a #15 scalpel blade.  Given the location of the defect, shape of the defect and the proximity to free margins a dermal autograft was deemed most appropriate.  Using a sterile surgical marker, the primary defect shape was transferred to the donor site. The area thus outlined was incised deep to adipose tissue with a #15 scalpel blade.  The harvested graft was then trimmed of adipose and epidermal tissue until only dermis was left.  The skin graft was then placed in the primary defect and oriented appropriately.
Ftsg Text: Given the location of the defect, shape of the defect and the proximity to free margins a full thickness skin graft was deemed most appropriate. The risks, benefits, and possible outcomes of this procedure were discussed along with the risks, benefits, and possible outcomes of other options for wound repair (including but not limited to: variations of complex closure, flaps, other types of grafts, and second intention). The patient verbalized understanding and consent to the outlined procedure. The patient understands should the surgical site heal in a manner they find unsatisfactory further interventions or referral to an additional specialist may be required. The graft donor and recipient site were prepped and draped in sterile fashion. Anesthesia was checked and supplemented as necessary. The primary defect shape was templated with sterile suture packing and transferred to the donor site. The area thus outlined was incised, along with planned standing tissue cones, deep to adipose tissue with a #15 scalpel blade.  The harvested graft was then trimmed of adipose tissue until only dermis and epidermis was left.  The skin margins of the secondary defect were undermined to an appropriate distance in all directions utilizing fine tipped surgical scissors. Hemostasis was achieved and then the secondary defect was sutured with interrupted buried subcutaneous sutures. The skin graft was then placed in the primary defect and oriented appropriately. The graft was sutured into place with simple interrupted and simple running cutaneous sutures.
Pinch Graft Text: The defect edges were debeveled with a #15 scalpel blade. Given the location of the defect, shape of the defect and the proximity to free margins a pinch graft was deemed most appropriate. Using a sterile surgical marker, the primary defect shape was transferred to the donor site. The area thus outlined was incised deep to adipose tissue with a #15 scalpel blade.  The harvested graft was then trimmed of adipose tissue until only dermis and epidermis was left. The skin margins of the secondary defect were undermined to an appropriate distance in all directions utilizing iris scissors.  The secondary defect was closed with interrupted buried subcutaneous sutures.  The skin edges were then re-apposed with running  sutures.  The skin graft was then placed in the primary defect and oriented appropriately.
Skin Substitute Text: The defect edges were debeveled with a #15 scalpel blade.  Given the location of the defect, shape of the defect and the proximity to free margins a skin substitute graft was deemed most appropriate.  The graft material was trimmed to fit the size of the defect. The graft was then placed in the primary defect and oriented appropriately.
Split-Thickness Skin Graft Text: The defect edges were debeveled with a #15 scalpel blade.  Given the location of the defect, shape of the defect and the proximity to free margins a split thickness skin graft was deemed most appropriate.  Using a sterile surgical marker, the primary defect shape was transferred to the donor site. The split thickness graft was then harvested.  The skin graft was then placed in the primary defect and oriented appropriately.
Tissue Cultured Epidermal Autograft Text: The defect edges were debeveled with a #15 scalpel blade.  Given the location of the defect, shape of the defect and the proximity to free margins a tissue cultured epidermal autograft was deemed most appropriate.  The graft was then trimmed to fit the size of the defect.  The graft was then placed in the primary defect and oriented appropriately.
Xenograft Text: The defect edges were debeveled with a #15 scalpel blade.  Given the location of the defect, shape of the defect and the proximity to free margins a xenograft was deemed most appropriate.  The graft was then trimmed to fit the size of the defect.  The graft was then placed in the primary defect and oriented appropriately.
Complex Repair And Flap Additional Text (Will Appearing After The Standard Complex Repair Text): The complex repair was not sufficient to completely close the primary defect. The remaining additional defect was repaired with the flap mentioned below.
Complex Repair And Graft Additional Text (Will Appearing After The Standard Complex Repair Text): The complex repair was not sufficient to completely close the primary defect. The remaining additional defect was repaired with the graft mentioned below.
Eyelid Full Thickness Repair - 13845: The eyelid defect was full thickness which required a wedge repair of the eyelid. Special care was taken to ensure that the eyelid margin was realligned when placing sutures.
Eyelid Partial Thickness Repair - 27886: The eyelid defect was partial thickness which required a wedge repair of the eyelid. Special care was taken to ensure that the eyelid margin was realligned when placing sutures.
Intermediate Repair And Flap Additional Text (Will Appearing After The Standard Complex Repair Text): The intermediate repair was not sufficient to completely close the primary defect. The remaining additional defect was repaired with the flap mentioned below.
Intermediate Repair And Graft Additional Text (Will Appearing After The Standard Complex Repair Text): The intermediate repair was not sufficient to completely close the primary defect. The remaining additional defect was repaired with the graft mentioned below.
Localized Dermabrasion With 15 Blade Text: The patient was draped in routine manner.  Localized dermabrasion using a 15 blade was performed in routine manner to papillary dermis. This spot dermabrasion is being performed to complete skin cancer reconstruction. It also will eliminate the other sun damaged precancerous cells that are known to be part of the regional effect of a lifetime's worth of sun exposure. This localized dermabrasion is therapeutic and should not be considered cosmetic in any regard.
Localized Dermabrasion With Sand Papertext: The patient was draped in routine manner.  Localized dermabrasion using sterile sand paper was performed in routine manner to papillary dermis. This spot dermabrasion is being performed to complete skin cancer reconstruction. It also will eliminate the other sun damaged precancerous cells that are known to be part of the regional effect of a lifetime's worth of sun exposure. This localized dermabrasion is therapeutic and should not be considered cosmetic in any regard.
Localized Dermabrasion With Wire Brush Text: The patient was draped in routine manner.  Localized dermabrasion using 3 x 17 mm wire brush was performed in routine manner to papillary dermis. This spot dermabrasion is being performed to complete skin cancer reconstruction. It also will eliminate the other sun damaged precancerous cells that are known to be part of the regional effect of a lifetime's worth of sun exposure. This localized dermabrasion is therapeutic and should not be considered cosmetic in any regard.
Purse String (Simple) Text: Given the location of the defect and the characteristics of the surrounding skin a purse string closure was deemed most appropriate.  Undermining was performed circumferentially around the surgical defect.  A purse string suture was then placed and tightened.
Purse String (Intermediate) Text: Given the location of the defect and the characteristics of the surrounding skin a purse string intermediate closure was deemed most appropriate.  Undermining was performed circumferentially around the surgical defect.  A purse string suture was then placed and tightened.
Partial Purse String (Simple) Text: Given the location of the defect and the characteristics of the surrounding skin a simple purse string closure was deemed most appropriate.  Undermining was performed circumferentially around the surgical defect.  A purse string suture was then placed and tightened. Wound tension only allowed a partial closure of the circular defect.
Partial Purse String (Intermediate) Text: Given the location of the defect and the characteristics of the surrounding skin an intermediate purse string closure was deemed most appropriate.  Undermining was performed circumferentially around the surgical defect.  A purse string suture was then placed and tightened. Wound tension only allowed a partial closure of the circular defect.
Tarsorrhaphy Text: A tarsorrhaphy was performed using Frost sutures.
Manual Repair Warning Statement: We plan on removing the manually selected variable below in favor of our much easier automatic structured text blocks found in the previous tab. We decided to do this to help make the flow better and give you the full power of structured data. Manual selection is never going to be ideal in our platform and I would encourage you to avoid using manual selection from this point on, especially since I will be sunsetting this feature. It is important that you do one of two things with the customized text below. First, you can save all of the text in a word file so you can have it for future reference. Second, transfer the text to the appropriate area in the Library tab. Lastly, if there is a flap or graft type which we do not have you need to let us know right away so I can add it in before the variable is hidden. No need to panic, we plan to give you roughly 6 months to make the change.
Same Histology In Subsequent Stages Text: The pattern and morphology of the tumor is as described in the first stage.
No Residual Tumor Seen Histology Text: There were no malignant cells seen in the sections examined.
Inflammation Suggestive Of Cancer Camouflage Histology Text: There was a dense lymphocytic infiltrate which prevented adequate histologic evaluation of adjacent structures.
Incidental Superficial Basal Cell Carcinoma Histology Text: One or more foci of “budding” basaloid tumor with peripheral palisading is attached superficially to the epidermis and/or adjacent superficial hair follicle(s) and is surrounded by a myxoid stroma.
Bcc Histology Text: There were numerous aggregates of basaloid cells.
Bcc Infiltrative Histology Text: Thin cords of basaloid tumor with angulated ends/spiky islands composed of few to many basaloid keratinocytes, embedded in a myxoid to sclerotic stroma. Retraction of the tumor islands away from the surrounding stroma is variable.
Bcc Micronodular Histology Text: Multiple small aggregates of basaloid cells are present within the dermis. These small nodules display variable subtle peripheral palisading and retraction artifact.
Bcc  Morpheaform/Sclerosing Histology Text: Thin cords of basaloid cells surrounded by a sclerotic collagenous stroma, with mostly absent peripheral palisading and stromal cleft formation.
Bcc  Nodular Histology Text: Discrete nests/nodules of malignant basaloid tumor is present within the dermis and/or attached to the epidermis. The tumor demonstrates palisading and is retracted away from a surrounding mucoid stroma.
Bcc  Nodulocystic Histology Text: Discrete nests/nodules of malignant basaloid tumor is present within the dermis and/or attached to the epidermis. The tumor demonstrates palisading and is retracted away from a surrounding mucoid stroma. Some areas of tumor show cystic degeneration.
Bcc Pigmented Histology Text: Discrete nests/nodules of malignant basaloid tumor is present within the dermis and/or attached to the epidermis. The tumor demonstrates palisading and is retracted away from a surrounding mucoid stroma. Some areas of tumor show deposition of melanin.
Bcc Superficial Histology Text: Multiple foci of “budding” basaloid tumor with peripheral palisading is attached superficially to the epidermis and/or adjacent superficial hair follicle(s) and is surrounded by a myxoid stroma.
Mixed Superficial And Nodular Bcc Histology Text: Histologic features of both superficial (Multiple foci of “budding” basaloid tumor with peripheral palisading is attached superficially to the epidermis and/or adjacent superficial hair follicle(s) and is surrounded by a myxoid stroma) AND nodular (Discrete nests/nodules of malignant basaloid tumor is present within the dermis and/or attached to the epidermis. The tumor demonstrates palisading and is retracted away from a surrounding mucoid stroma) basal cell carcinoma are present.
Mixed Nodular And Infiltrative Bcc Histology Text: Histologic features of both nodular (Discrete nests/nodules of malignant basaloid tumor is present within the dermis and/or attached to the epidermis. The tumor demonstrates palisading and is retracted away from a surrounding mucoid stroma) and infiltrative (Thin cords of basaloid tumor with angulated ends/spiky islands composed of few to many basaloid keratinocytes, embedded in a myxoid to sclerotic stroma. Retraction of the tumor islands away from the surrounding stroma is variable) basal cell carcinoma are present.
Mixed Nodular And Micronodular Bcc Histology Text: Histologic features of both nodular (Discrete nests/nodules of malignant basaloid tumor is present within the dermis and/or attached to the epidermis. The tumor demonstrates palisading and is retracted away from a surrounding mucoid stroma) and micronodular (Multiple small aggregates of basaloid cells are present within the dermis. These small nodules display variable subtle peripheral palisading and retraction artifact) basal cell carcinoma are present.
Metatypical Bcc Histology Text: areas of basaloid tumor with variable eosinophilic features and occasional apoptotic cells are visualized in the dermis. Peripheral palisading and stromal retraction is rare to absent.
Scc Histology Text: Full thickness keratinocyte atypia is observed microscopically. Focal dyskeratosis and apoptosis of keratinocytes is observed. The epidermis is acanthotic with growth of atypical keratinocytes beyond the level of the sebaceous glands.
Scc Moderately Differentiated Histology Text: Full thickness keratinocyte atypia is observed microscopically. Focal dyskeratosis and apoptosis of keratinocytes is observed. The epidermis is acanthotic with growth of atypical keratinocytes beyond the level of the sebaceous glands, many groups of keratinocytes display abnormal or absent keratin formation.
Scc Poorly Differentiated Histology Text: Full thickness keratinocyte atypia is observed microscopically. Focal dyskeratosis and apoptosis of keratinocytes is observed. The epidermis is acanthotic with growth of atypical keratinocytes beyond the level of the sebaceous glands, most keratinocytes show little to no keratinization.
Scc In Situ Histology Text: full thickness keratinocyte atypia is present within the epidermis with overlying loss of the granular cell layer and overlying parakeratosis.
Scc In Situ With Follicular Extension Histology Text: full thickness keratinocyte atypia is present within the epidermis with overlying loss of the granular cell layer and overlying parakeratosis. Adjacent follicular units are notably involved.
Mart-1 - Positive Histology Text: MART-1 staining demonstrates areas of higher density and clustering of melanocytes with Pagetoid spread upwards within the epidermis. The surgical margins are positive for tumor cells.
Mart-1 - Negative Histology Text: MART-1 staining demonstrates a normal density and pattern of melanocytes along the dermal-epidermal junction. The surgical margins are negative for tumor cells.
Information: Selecting Yes will display possible errors in your note based on the variables you have selected. This validation is only offered as a suggestion for you. PLEASE NOTE THAT THE VALIDATION TEXT WILL BE REMOVED WHEN YOU FINALIZE YOUR NOTE. IF YOU WANT TO FAX A PRELIMINARY NOTE YOU WILL NEED TO TOGGLE THIS TO 'NO' IF YOU DO NOT WANT IT IN YOUR FAXED NOTE.
Bill 59 Modifier?: No - Continue to Bill 79 Modifier

## 2025-04-08 NOTE — PROCEDURE: MOHS SURGERY
----- Message from Martir sent at 4/8/2025  9:40 AM CDT -----  Contact: Umm  Type:  Pharmacy Calling to Clarify an RXName of Caller:  Joni Name:  [facility] - [place], [place] - [address] [id number] [facility] [address]Bpfsz: [phone] Fax: [phone] Name:  levoFLOXacin (LEVAQUIN) 500 MG tabletWhat do they need to clarify?:  drug interaction   Surgeon: Dr. Edmar Mora

## 2025-06-17 ENCOUNTER — APPOINTMENT (OUTPATIENT)
Dept: URBAN - METROPOLITAN AREA SURGERY 18 | Age: 80
Setting detail: DERMATOLOGY
End: 2025-06-22

## 2025-06-17 DIAGNOSIS — Z85.820 PERSONAL HISTORY OF MALIGNANT MELANOMA OF SKIN: ICD-10-CM

## 2025-06-17 DIAGNOSIS — Z85.828 PERSONAL HISTORY OF OTHER MALIGNANT NEOPLASM OF SKIN: ICD-10-CM

## 2025-06-17 DIAGNOSIS — L57.0 ACTINIC KERATOSIS: ICD-10-CM

## 2025-06-17 DIAGNOSIS — L82.1 OTHER SEBORRHEIC KERATOSIS: ICD-10-CM

## 2025-06-17 DIAGNOSIS — D22 MELANOCYTIC NEVI: ICD-10-CM

## 2025-06-17 DIAGNOSIS — Z86.006 PERSONAL HISTORY OF MELANOMA IN-SITU: ICD-10-CM

## 2025-06-17 DIAGNOSIS — L81.4 OTHER MELANIN HYPERPIGMENTATION: ICD-10-CM

## 2025-06-17 DIAGNOSIS — D18.0 HEMANGIOMA: ICD-10-CM

## 2025-06-17 DIAGNOSIS — D37.01 NEOPLASM OF UNCERTAIN BEHAVIOR OF LIP: ICD-10-CM

## 2025-06-17 DIAGNOSIS — Z71.89 OTHER SPECIFIED COUNSELING: ICD-10-CM

## 2025-06-17 DIAGNOSIS — D485 NEOPLASM OF UNCERTAIN BEHAVIOR OF SKIN: ICD-10-CM

## 2025-06-17 PROBLEM — D22.72 MELANOCYTIC NEVI OF LEFT LOWER LIMB, INCLUDING HIP: Status: ACTIVE | Noted: 2025-06-17

## 2025-06-17 PROBLEM — D18.01 HEMANGIOMA OF SKIN AND SUBCUTANEOUS TISSUE: Status: ACTIVE | Noted: 2025-06-17

## 2025-06-17 PROBLEM — D22.62 MELANOCYTIC NEVI OF LEFT UPPER LIMB, INCLUDING SHOULDER: Status: ACTIVE | Noted: 2025-06-17

## 2025-06-17 PROBLEM — D22.61 MELANOCYTIC NEVI OF RIGHT UPPER LIMB, INCLUDING SHOULDER: Status: ACTIVE | Noted: 2025-06-17

## 2025-06-17 PROBLEM — D22.5 MELANOCYTIC NEVI OF TRUNK: Status: ACTIVE | Noted: 2025-06-17

## 2025-06-17 PROBLEM — D48.5 NEOPLASM OF UNCERTAIN BEHAVIOR OF SKIN: Status: ACTIVE | Noted: 2025-06-17

## 2025-06-17 PROBLEM — D22.71 MELANOCYTIC NEVI OF RIGHT LOWER LIMB, INCLUDING HIP: Status: ACTIVE | Noted: 2025-06-17

## 2025-06-17 PROCEDURE — OTHER COUNSELING: OTHER

## 2025-06-17 PROCEDURE — OTHER LIQUID NITROGEN: OTHER

## 2025-06-17 PROCEDURE — 11102 TANGNTL BX SKIN SINGLE LES: CPT | Mod: 59,79

## 2025-06-17 PROCEDURE — OTHER BIOPSY BY SHAVE METHOD: OTHER

## 2025-06-17 PROCEDURE — 99213 OFFICE O/P EST LOW 20 MIN: CPT | Mod: 24,25

## 2025-06-17 PROCEDURE — OTHER ADDITIONAL NOTES: OTHER

## 2025-06-17 PROCEDURE — 40490 BIOPSY OF LIP: CPT | Mod: 79

## 2025-06-17 PROCEDURE — 17004 DESTROY PREMAL LESIONS 15/>: CPT | Mod: 79

## 2025-06-17 ASSESSMENT — LOCATION DETAILED DESCRIPTION DERM
LOCATION DETAILED: RIGHT UPPER CUTANEOUS LIP
LOCATION DETAILED: RIGHT PROXIMAL PRETIBIAL REGION
LOCATION DETAILED: LEFT RADIAL DORSAL HAND
LOCATION DETAILED: RIGHT ANTERIOR DISTAL THIGH
LOCATION DETAILED: RIGHT INFERIOR POSTAURICULAR SKIN
LOCATION DETAILED: NASAL SUPRATIP
LOCATION DETAILED: LEFT INFERIOR MEDIAL FOREHEAD
LOCATION DETAILED: LEFT ANTERIOR DISTAL THIGH
LOCATION DETAILED: RIGHT DISTAL RADIAL DORSAL FOREARM
LOCATION DETAILED: RIGHT MEDIAL ZYGOMA
LOCATION DETAILED: RIGHT INFERIOR MEDIAL UPPER BACK
LOCATION DETAILED: RIGHT CENTRAL ZYGOMA
LOCATION DETAILED: LEFT DISTAL RADIAL DORSAL FOREARM
LOCATION DETAILED: PERIUMBILICAL SKIN
LOCATION DETAILED: RIGHT ANTERIOR PROXIMAL THIGH
LOCATION DETAILED: RIGHT DISTAL MEDIAL CALF
LOCATION DETAILED: RIGHT DORSAL SMALL METACARPOPHALANGEAL JOINT
LOCATION DETAILED: LEFT DISTAL ULNAR DORSAL FOREARM
LOCATION DETAILED: RIGHT CENTRAL PARIETAL SCALP
LOCATION DETAILED: LEFT DISTAL PRETIBIAL REGION
LOCATION DETAILED: RIGHT RADIAL DORSAL HAND
LOCATION DETAILED: RIGHT SUPERIOR PARIETAL SCALP
LOCATION DETAILED: LEFT PROXIMAL PRETIBIAL REGION
LOCATION DETAILED: RIGHT MID-UPPER BACK
LOCATION DETAILED: INFERIOR THORACIC SPINE
LOCATION DETAILED: RIGHT ULNAR DORSAL HAND
LOCATION DETAILED: UPPER STERNUM
LOCATION DETAILED: LEFT INFERIOR UPPER BACK
LOCATION DETAILED: INFERIOR MID FOREHEAD
LOCATION DETAILED: RIGHT INFERIOR FOREHEAD
LOCATION DETAILED: LEFT SUPERIOR LATERAL FOREHEAD
LOCATION DETAILED: LEFT LATERAL FOREHEAD
LOCATION DETAILED: LEFT INFERIOR VERMILION LIP
LOCATION DETAILED: RIGHT ANTERIOR PROXIMAL UPPER ARM
LOCATION DETAILED: LEFT ANTERIOR PROXIMAL UPPER ARM
LOCATION DETAILED: RIGHT SUPERIOR LATERAL FOREHEAD
LOCATION DETAILED: LEFT ANTERIOR SHOULDER
LOCATION DETAILED: RIGHT PROXIMAL DORSAL FOREARM
LOCATION DETAILED: RIGHT SUPERIOR FOREHEAD
LOCATION DETAILED: LEFT ANTERIOR PROXIMAL THIGH
LOCATION DETAILED: LEFT INFERIOR FOREHEAD
LOCATION DETAILED: RIGHT PROXIMAL POSTERIOR UPPER ARM
LOCATION DETAILED: EPIGASTRIC SKIN
LOCATION DETAILED: LEFT DORSAL RING METACARPOPHALANGEAL JOINT

## 2025-06-17 ASSESSMENT — LOCATION SIMPLE DESCRIPTION DERM
LOCATION SIMPLE: INFERIOR FOREHEAD
LOCATION SIMPLE: LEFT INFERIOR LIP
LOCATION SIMPLE: RIGHT PRETIBIAL REGION
LOCATION SIMPLE: LEFT THIGH
LOCATION SIMPLE: RIGHT POSTERIOR UPPER ARM
LOCATION SIMPLE: LEFT FOREARM
LOCATION SIMPLE: LEFT PRETIBIAL REGION
LOCATION SIMPLE: SCALP
LOCATION SIMPLE: LEFT UPPER ARM
LOCATION SIMPLE: RIGHT HAND
LOCATION SIMPLE: RIGHT THIGH
LOCATION SIMPLE: LEFT FOREHEAD
LOCATION SIMPLE: UPPER BACK
LOCATION SIMPLE: RIGHT LIP
LOCATION SIMPLE: RIGHT FOREARM
LOCATION SIMPLE: LEFT SHOULDER
LOCATION SIMPLE: CHEST
LOCATION SIMPLE: NOSE
LOCATION SIMPLE: LEFT HAND
LOCATION SIMPLE: RIGHT CALF
LOCATION SIMPLE: ABDOMEN
LOCATION SIMPLE: RIGHT ZYGOMA
LOCATION SIMPLE: RIGHT UPPER ARM
LOCATION SIMPLE: RIGHT FOREHEAD
LOCATION SIMPLE: LEFT UPPER BACK
LOCATION SIMPLE: RIGHT UPPER BACK

## 2025-06-17 ASSESSMENT — LOCATION ZONE DERM
LOCATION ZONE: LEG
LOCATION ZONE: ARM
LOCATION ZONE: LIP
LOCATION ZONE: HAND
LOCATION ZONE: FACE
LOCATION ZONE: SCALP
LOCATION ZONE: NOSE
LOCATION ZONE: TRUNK

## 2025-07-09 ENCOUNTER — APPOINTMENT (OUTPATIENT)
Dept: URBAN - METROPOLITAN AREA SURGERY 18 | Age: 80
Setting detail: DERMATOLOGY
End: 2025-07-10

## 2025-07-09 VITALS — DIASTOLIC BLOOD PRESSURE: 61 MMHG | HEART RATE: 80 BPM | SYSTOLIC BLOOD PRESSURE: 112 MMHG

## 2025-07-09 PROBLEM — D00.01 CARCINOMA IN SITU OF LABIAL MUCOSA AND VERMILION BORDER: Status: ACTIVE | Noted: 2025-07-09

## 2025-07-09 PROCEDURE — OTHER COUNSELING: OTHER

## 2025-07-09 PROCEDURE — 17311 MOHS 1 STAGE H/N/HF/G: CPT

## 2025-07-09 PROCEDURE — OTHER MOHS SURGERY: OTHER

## 2025-07-09 PROCEDURE — OTHER MIPS QUALITY: OTHER

## 2025-07-09 PROCEDURE — 14061 TIS TRNFR E/N/E/L10.1-30SQCM: CPT

## 2025-07-09 PROCEDURE — 17312 MOHS ADDL STAGE: CPT

## 2025-07-15 ENCOUNTER — APPOINTMENT (OUTPATIENT)
Dept: URBAN - METROPOLITAN AREA SURGERY 18 | Age: 80
Setting detail: DERMATOLOGY
End: 2025-07-16

## 2025-07-15 DIAGNOSIS — Z48.817 ENCOUNTER FOR SURGICAL AFTERCARE FOLLOWING SURGERY ON THE SKIN AND SUBCUTANEOUS TISSUE: ICD-10-CM

## 2025-07-15 PROCEDURE — OTHER POST-OP WOUND CHECK: OTHER

## 2025-07-15 PROCEDURE — 99024 POSTOP FOLLOW-UP VISIT: CPT

## 2025-07-15 PROCEDURE — OTHER MIPS QUALITY: OTHER

## 2025-07-15 ASSESSMENT — LOCATION SIMPLE DESCRIPTION DERM: LOCATION SIMPLE: LEFT LIP

## 2025-07-15 ASSESSMENT — LOCATION DETAILED DESCRIPTION DERM: LOCATION DETAILED: LEFT INFERIOR VERMILION LIP

## 2025-07-15 ASSESSMENT — LOCATION ZONE DERM: LOCATION ZONE: LIP

## 2025-07-22 ENCOUNTER — APPOINTMENT (OUTPATIENT)
Dept: URBAN - METROPOLITAN AREA SURGERY 18 | Age: 80
Setting detail: DERMATOLOGY
End: 2025-07-27

## 2025-07-22 DIAGNOSIS — L81.4 OTHER MELANIN HYPERPIGMENTATION: ICD-10-CM

## 2025-07-22 DIAGNOSIS — Z71.89 OTHER SPECIFIED COUNSELING: ICD-10-CM

## 2025-07-22 DIAGNOSIS — L82.0 INFLAMED SEBORRHEIC KERATOSIS: ICD-10-CM

## 2025-07-22 PROBLEM — D04.72 CARCINOMA IN SITU OF SKIN OF LEFT LOWER LIMB, INCLUDING HIP: Status: ACTIVE | Noted: 2025-07-22

## 2025-07-22 PROCEDURE — OTHER MEDICATION COUNSELING: OTHER

## 2025-07-22 PROCEDURE — OTHER PRESCRIPTION: OTHER

## 2025-07-22 PROCEDURE — OTHER LIQUID NITROGEN: OTHER

## 2025-07-22 PROCEDURE — OTHER DIAGNOSIS COMMENT: OTHER

## 2025-07-22 PROCEDURE — OTHER COUNSELING: OTHER

## 2025-07-22 PROCEDURE — 99213 OFFICE O/P EST LOW 20 MIN: CPT | Mod: 24,25

## 2025-07-22 PROCEDURE — 17110 DESTRUCT B9 LESION 1-14: CPT | Mod: 79

## 2025-07-22 RX ORDER — FLUOROURACIL 2 G/40G
CREAM TOPICAL BID
Qty: 40 | Refills: 0 | Status: ERX | COMMUNITY
Start: 2025-07-22

## 2025-07-22 ASSESSMENT — LOCATION SIMPLE DESCRIPTION DERM
LOCATION SIMPLE: CHEST
LOCATION SIMPLE: LEFT CLAVICULAR SKIN

## 2025-07-22 ASSESSMENT — LOCATION ZONE DERM: LOCATION ZONE: TRUNK

## 2025-07-22 ASSESSMENT — LOCATION DETAILED DESCRIPTION DERM
LOCATION DETAILED: LEFT CLAVICULAR SKIN
LOCATION DETAILED: UPPER STERNUM
LOCATION DETAILED: LEFT MEDIAL SUPERIOR CHEST